# Patient Record
Sex: FEMALE | Race: WHITE
[De-identification: names, ages, dates, MRNs, and addresses within clinical notes are randomized per-mention and may not be internally consistent; named-entity substitution may affect disease eponyms.]

---

## 2020-05-18 PROBLEM — Z00.00 ENCOUNTER FOR PREVENTIVE HEALTH EXAMINATION: Status: ACTIVE | Noted: 2020-05-18

## 2020-05-26 ENCOUNTER — APPOINTMENT (OUTPATIENT)
Dept: VASCULAR SURGERY | Facility: CLINIC | Age: 75
End: 2020-05-26
Payer: MEDICARE

## 2020-05-26 VITALS
WEIGHT: 180 LBS | DIASTOLIC BLOOD PRESSURE: 72 MMHG | BODY MASS INDEX: 30.73 KG/M2 | HEIGHT: 64 IN | SYSTOLIC BLOOD PRESSURE: 138 MMHG

## 2020-05-26 DIAGNOSIS — Z87.891 PERSONAL HISTORY OF NICOTINE DEPENDENCE: ICD-10-CM

## 2020-05-26 PROCEDURE — 99203 OFFICE O/P NEW LOW 30 MIN: CPT

## 2020-05-26 PROCEDURE — 93971 EXTREMITY STUDY: CPT

## 2020-05-26 RX ORDER — IBUPROFEN 800 MG
TABLET ORAL
Refills: 0 | Status: ACTIVE | COMMUNITY

## 2020-05-26 RX ORDER — ELECTROLYTES/DEXTROSE
SOLUTION, ORAL ORAL
Refills: 0 | Status: ACTIVE | COMMUNITY

## 2020-05-26 NOTE — DATA REVIEWED
[FreeTextEntry1] : I performed a venous duplex which was medically necessary to evaluate for DVT. It showed dilated left popliteal vein without any evidence of thrombus, but stagnant flow.\par

## 2020-05-26 NOTE — HISTORY OF PRESENT ILLNESS
[FreeTextEntry1] : 75 y/o female presents for evaluation of left leg swelling for the past 3-4 weeks, denies any trauma, denies any pain, or h/o DVT, also developed a pruritic rash to the medial malleolar region, was seen in Urgent care and prescribed Triamcinolone cream that has now improved the rash.

## 2020-05-26 NOTE — ASSESSMENT
[FreeTextEntry1] : 75 y/o female with left leg swelling for the past 3-4 weeks, denies any trauma, denies any pain, or h/o DVT, also developed a pruritic rash to the medial malleolar region, was seen in Urgent care and prescribed Triamcinolone cream that has now improved the rash.\par I performed a venous duplex which was medically necessary to evaluate for DVT. It showed dilated left popliteal vein without any evidence of thrombus, but stagnant flow.\par I recommend Aspirin 81 mg daily, compression stockings daily during the day, continue the steroid cream and follow up in 4 weeks for repeat venous duplex.

## 2020-06-23 ENCOUNTER — APPOINTMENT (OUTPATIENT)
Dept: VASCULAR SURGERY | Facility: CLINIC | Age: 75
End: 2020-06-23
Payer: MEDICARE

## 2020-06-23 DIAGNOSIS — M79.89 OTHER SPECIFIED SOFT TISSUE DISORDERS: ICD-10-CM

## 2020-06-23 PROCEDURE — 99213 OFFICE O/P EST LOW 20 MIN: CPT

## 2020-06-23 PROCEDURE — 93971 EXTREMITY STUDY: CPT

## 2020-06-23 NOTE — ASSESSMENT
[FreeTextEntry1] : 75 y/o female with left leg swelling for the past 3-4 weeks, seen 4 weeks ago, venous duplex showed dilated left popliteal vein without any evidence of thrombus, but stagnant flow, and was advised to start taking Aspirin 81 mg and compression stockings. She presents for follow up duplex today. States that she still has some left leg swelling and has been applying Triamcinolone cream to the rash.\par I performed a venous duplex which was medically necessary to evaluate for DVT. It showed no evidence of DVT in the left lower extremity.\par I have informed her of the test results and reassured her. She has venous insufficiency and stasis dermatitis. I recommend to continue with use of stockings and steroid cream. She can follow up as needed.

## 2020-06-23 NOTE — HISTORY OF PRESENT ILLNESS
[FreeTextEntry1] : 73 y/o female with left leg swelling for the past 3-4 weeks, seen 4 weeks ago, venous duplex showed dilated left popliteal vein without any evidence of thrombus, but stagnant flow, and was advised to start taking Aspirin 81 mg and compression stockings. She presents for follow up duplex today.\par  \par

## 2021-12-09 ENCOUNTER — EMERGENCY (EMERGENCY)
Facility: HOSPITAL | Age: 76
LOS: 0 days | Discharge: HOME | End: 2021-12-10
Attending: EMERGENCY MEDICINE | Admitting: EMERGENCY MEDICINE
Payer: MEDICARE

## 2021-12-09 VITALS
WEIGHT: 179.9 LBS | SYSTOLIC BLOOD PRESSURE: 193 MMHG | HEART RATE: 82 BPM | DIASTOLIC BLOOD PRESSURE: 78 MMHG | RESPIRATION RATE: 18 BRPM | TEMPERATURE: 98 F | OXYGEN SATURATION: 99 %

## 2021-12-09 DIAGNOSIS — R60.0 LOCALIZED EDEMA: ICD-10-CM

## 2021-12-09 DIAGNOSIS — R42 DIZZINESS AND GIDDINESS: ICD-10-CM

## 2021-12-09 LAB
ALBUMIN SERPL ELPH-MCNC: 4.1 G/DL — SIGNIFICANT CHANGE UP (ref 3.5–5.2)
ALP SERPL-CCNC: 98 U/L — SIGNIFICANT CHANGE UP (ref 30–115)
ALT FLD-CCNC: 13 U/L — SIGNIFICANT CHANGE UP (ref 0–41)
ANION GAP SERPL CALC-SCNC: 14 MMOL/L — SIGNIFICANT CHANGE UP (ref 7–14)
APTT BLD: 28.2 SEC — SIGNIFICANT CHANGE UP (ref 27–39.2)
AST SERPL-CCNC: 16 U/L — SIGNIFICANT CHANGE UP (ref 0–41)
BASOPHILS # BLD AUTO: 0.01 K/UL — SIGNIFICANT CHANGE UP (ref 0–0.2)
BASOPHILS NFR BLD AUTO: 0.3 % — SIGNIFICANT CHANGE UP (ref 0–1)
BILIRUB SERPL-MCNC: 0.3 MG/DL — SIGNIFICANT CHANGE UP (ref 0.2–1.2)
BUN SERPL-MCNC: 12 MG/DL — SIGNIFICANT CHANGE UP (ref 10–20)
CALCIUM SERPL-MCNC: 9.2 MG/DL — SIGNIFICANT CHANGE UP (ref 8.5–10.1)
CHLORIDE SERPL-SCNC: 102 MMOL/L — SIGNIFICANT CHANGE UP (ref 98–110)
CO2 SERPL-SCNC: 23 MMOL/L — SIGNIFICANT CHANGE UP (ref 17–32)
CREAT SERPL-MCNC: 0.6 MG/DL — LOW (ref 0.7–1.5)
EOSINOPHIL # BLD AUTO: 0.09 K/UL — SIGNIFICANT CHANGE UP (ref 0–0.7)
EOSINOPHIL NFR BLD AUTO: 2.7 % — SIGNIFICANT CHANGE UP (ref 0–8)
GLUCOSE SERPL-MCNC: 98 MG/DL — SIGNIFICANT CHANGE UP (ref 70–99)
HCT VFR BLD CALC: 35.7 % — LOW (ref 37–47)
HGB BLD-MCNC: 11.4 G/DL — LOW (ref 12–16)
IMM GRANULOCYTES NFR BLD AUTO: 0.6 % — HIGH (ref 0.1–0.3)
INR BLD: 1.06 RATIO — SIGNIFICANT CHANGE UP (ref 0.65–1.3)
LYMPHOCYTES # BLD AUTO: 1.22 K/UL — SIGNIFICANT CHANGE UP (ref 1.2–3.4)
LYMPHOCYTES # BLD AUTO: 36.4 % — SIGNIFICANT CHANGE UP (ref 20.5–51.1)
MCHC RBC-ENTMCNC: 30.5 PG — SIGNIFICANT CHANGE UP (ref 27–31)
MCHC RBC-ENTMCNC: 31.9 G/DL — LOW (ref 32–37)
MCV RBC AUTO: 95.5 FL — SIGNIFICANT CHANGE UP (ref 81–99)
MONOCYTES # BLD AUTO: 0.17 K/UL — SIGNIFICANT CHANGE UP (ref 0.1–0.6)
MONOCYTES NFR BLD AUTO: 5.1 % — SIGNIFICANT CHANGE UP (ref 1.7–9.3)
NEUTROPHILS # BLD AUTO: 1.84 K/UL — SIGNIFICANT CHANGE UP (ref 1.4–6.5)
NEUTROPHILS NFR BLD AUTO: 54.9 % — SIGNIFICANT CHANGE UP (ref 42.2–75.2)
NRBC # BLD: 0 /100 WBCS — SIGNIFICANT CHANGE UP (ref 0–0)
PLATELET # BLD AUTO: 296 K/UL — SIGNIFICANT CHANGE UP (ref 130–400)
POTASSIUM SERPL-MCNC: 4.7 MMOL/L — SIGNIFICANT CHANGE UP (ref 3.5–5)
POTASSIUM SERPL-SCNC: 4.7 MMOL/L — SIGNIFICANT CHANGE UP (ref 3.5–5)
PROT SERPL-MCNC: 7.1 G/DL — SIGNIFICANT CHANGE UP (ref 6–8)
PROTHROM AB SERPL-ACNC: 12.2 SEC — SIGNIFICANT CHANGE UP (ref 9.95–12.87)
RBC # BLD: 3.74 M/UL — LOW (ref 4.2–5.4)
RBC # FLD: 17.5 % — HIGH (ref 11.5–14.5)
SODIUM SERPL-SCNC: 139 MMOL/L — SIGNIFICANT CHANGE UP (ref 135–146)
TROPONIN T SERPL-MCNC: <0.01 NG/ML — SIGNIFICANT CHANGE UP
WBC # BLD: 3.35 K/UL — LOW (ref 4.8–10.8)
WBC # FLD AUTO: 3.35 K/UL — LOW (ref 4.8–10.8)

## 2021-12-09 PROCEDURE — 99285 EMERGENCY DEPT VISIT HI MDM: CPT

## 2021-12-09 PROCEDURE — 93970 EXTREMITY STUDY: CPT | Mod: 26

## 2021-12-09 PROCEDURE — 70496 CT ANGIOGRAPHY HEAD: CPT | Mod: 26,MA

## 2021-12-09 PROCEDURE — 70450 CT HEAD/BRAIN W/O DYE: CPT | Mod: 26,MA

## 2021-12-09 PROCEDURE — 70498 CT ANGIOGRAPHY NECK: CPT | Mod: 26,MA

## 2021-12-09 PROCEDURE — 93010 ELECTROCARDIOGRAM REPORT: CPT

## 2021-12-09 RX ORDER — SODIUM CHLORIDE 9 MG/ML
1000 INJECTION, SOLUTION INTRAVENOUS ONCE
Refills: 0 | Status: COMPLETED | OUTPATIENT
Start: 2021-12-09 | End: 2021-12-09

## 2021-12-09 RX ORDER — MECLIZINE HCL 12.5 MG
50 TABLET ORAL ONCE
Refills: 0 | Status: COMPLETED | OUTPATIENT
Start: 2021-12-09 | End: 2021-12-09

## 2021-12-09 RX ADMIN — Medication 50 MILLIGRAM(S): at 20:59

## 2021-12-09 RX ADMIN — SODIUM CHLORIDE 1000 MILLILITER(S): 9 INJECTION, SOLUTION INTRAVENOUS at 20:23

## 2021-12-09 NOTE — ED PROVIDER NOTE - NS ED ROS FT
Constitutional: (-) fever  Eyes/ENT: (-) visual changes   Cardiovascular: (-) chest pain, (-) syncope  Respiratory: (-) cough, (-) shortness of breath  Gastrointestinal: (-) vomiting, (-) diarrhea  Genitourinary: (-) dysuria, (-) hesitancy, (-) frequency   Musculoskeletal: (-) neck pain, (-) back pain, (-) joint pain  Integumentary: (-) rash, (+) L leg edema  Neurological: (-) headache, (-) altered mental status, dizziness   Allergic/Immunologic: (-) pruritus

## 2021-12-09 NOTE — ED ADULT TRIAGE NOTE - CHIEF COMPLAINT QUOTE
Patient c/o room spinning dizziness intermittently x 2 months, worse when changing positions. Patient has hx of vertigo. Patient also left leg swelling and redness

## 2021-12-09 NOTE — ED PROVIDER NOTE - ATTENDING CONTRIBUTION TO CARE
75 yo F pmh of vertigo and hx of brain aneurism sp coiling in 2008 presents with 1 month hx of intermittent dizziness. Describes it as room spinning, assocaited with nasuea. Dizziness worse with movement. States that yesterday the dizziness worsening so came in for evaluation. no headaches. no numbnes,s tingling or weakness. Able to ambulate. Also reports that for a few months has been having swelling to her left lower extremity around her ankle. Previous negative evaluation by vascular. no fevers or recent illness.     CONSTITUTIONAL: Well-developed; well-nourished; in no acute distress.   SKIN: warm, dry  HEAD: Normocephalic; atraumatic.  EYES: PERRL, EOMI, no conjunctival erythema  ENT: No nasal discharge; airway clear.  NECK: Supple; non tender.  CARD: S1, S2 normal;  Regular rate and rhythm.   RESP: No wheezes, rales or rhonchi.  ABD: soft non tender, non distended, no rebound or guarding  EXT: Normal ROM.  5/5 strength in all 4 extremities   LYMPH: No acute cervical adenopathy.  NEURO: A&Ox3, CN 2-11 intact, moving all extremities, 5/5 strength, equal sensation bilaterally, normal finger to nose exam.   PSYCH: Cooperative, appropriate.

## 2021-12-09 NOTE — ED PROVIDER NOTE - CLINICAL SUMMARY MEDICAL DECISION MAKING FREE TEXT BOX
evaluated for vertigo. imaging obtained to evaluate for posterior stroke. lab work was nml. neurology was consulted and recommended admission for further investigation however patient decided to leave ama.    The patient wishes to leave against medical advice.  I have discussed the risks, benefits and alternatives (including the possibility of worsening of disease, pain, permanent disability, and/or death) with the patient and his/her family (if available).  The patient voices understanding of these risks, benefits, and alternatives and still wishes to sign out against medical advice.  The patient is awake, alert, oriented  x 3 and has demonstrated capacity to refuse/direct care.  I have advised the patient that they can and should return immediately should they develop any worse/different/additional symptoms, or if they change their mind and want to continue their care.

## 2021-12-09 NOTE — ED PROVIDER NOTE - PROGRESS NOTE DETAILS
preliminary duplex read negative for dvt Patient signed out to Dr. Higgins pending imaging and reassessment. I had extensive discussion of risks and benefits of pursuing further medical evaluation and/or care with patient and any available family/friends; patient still electing to leave against medical advice. Patient is awake, alert, oriented and demonstrates full capacity and insight into illness. Patient aware and encouraged to return immediately to ED or nearest ED if patient decides to change mind regarding care or if patient experiences any new, worsening, or concerning symptoms.

## 2021-12-09 NOTE — ED PROVIDER NOTE - NSFOLLOWUPCLINICS_GEN_ALL_ED_FT
[facility]  Neurology  [address] [place] [address], [facility] [address]  [place] [address]  Phone: [phone]  Fax:   Follow Up Time: 1-3 Days

## 2021-12-09 NOTE — ED PROVIDER NOTE - OBJECTIVE STATEMENT
77 yo female with a pmh of vertigo presents c/o intermittent dizziness for 2 months. pt describes the dizziness as room spinning sensation and notes aggravation with moving from sitting to standing. pt also mentions to have L leg edema since the spring and states to have been evaluated by vascular w/ a negative duplex. pt denies any other symptoms including fevers, chill, headache, recent illness/travel, cough, abdominal pain, chest pain, or SOB.

## 2021-12-09 NOTE — ED PROVIDER NOTE - PATIENT PORTAL LINK FT
You can access the FollowMyHealth Patient Portal offered by [facility] by registering at the following website: [email or web address]. By joining [facility]’s FollowMyHealth portal, you will also be able to view your health information using other applications (apps) compatible with our system.

## 2021-12-10 VITALS
TEMPERATURE: 98 F | DIASTOLIC BLOOD PRESSURE: 66 MMHG | HEART RATE: 72 BPM | OXYGEN SATURATION: 98 % | RESPIRATION RATE: 18 BRPM | SYSTOLIC BLOOD PRESSURE: 144 MMHG

## 2021-12-10 NOTE — CONSULT NOTE ADULT - SUBJECTIVE AND OBJECTIVE BOX
CC: Dizziness        HPI:  75 yo female with a pmhX of brain aneurysm s/p clipping at [facility] in [place] presented with the c/o intermittent dizziness for past 2 months a/w nausea. Patient describes dizziness as (imbalance >room spinning) aggravation with positional changes. She also reports neck pain/ tenderness She reports seeing her neurologist in [place] 3 months ago for a routine follow up for her aneurysm and reports the imaging to be negative for any acute changes. Denies focal weakness, vomiting, ear symptoms.       Home Medications:  Unable to obtain info        Social History  Denies smoking alcohol or drug use      Neuro Exam:  Orientation: patient is awake alert/ oriented to person place and time. Normal attention and comprehension.  Cranial Nerves: EOMI, has mild horizontal nystagmus which resolves with fixation, perrla, left facial droop (baseline as per patient and family), tongue midline  Motor: 5/5 throughout/ no drift   Sensory exam: Intact and symmetric  Coordination: No dysmetria or limb ataxia  Gait: slow and need to focus to walk, not unsteady, neither wide based.  Rombergs negative  No meningeal signs            NIHSS:   LOC:       1a: 0    1b(Questions):   0        1c(Instructions): 0            Best Gaze: 0  Visual:0  Motor:                 RUE: 0    RLE:0     LUE:  0   LLE:  0   FACE: 0    Limb Ataxia: 0  Sensory:    0   Language:  0     Dysarthria:  0        Extinction and Inattention: 0        mRs  0 No symptoms at all  1 No significant disability despite symptoms; able to carry out all usual duties and activities without assistance  2 Slight disability; unable to carry out all previous activities, but able to look after own affairs  3 Moderate disability; requiring some help, but able to walk without assistance  4 Moderately severe disability; unable to walk without assistance and unable to attend to own bodily needs without assistance  5 Severe disability; bedridden, incontinent and requiring constant nursing care and attention  6 Dead          Allergies    No Known Allergies    Intolerances      MEDICATIONS  (STANDING):    MEDICATIONS  (PRN):      LABS:                        11.4   3.35  )-----------( 296      ( 09 Dec 2021 20:05 )             35.7     12-09    139  |  102  |  12  ----------------------------<  98  4.7   |  23  |  0.6<L>    Ca    9.2      09 Dec 2021 20:05    TPro  7.1  /  Alb  4.1  /  TBili  0.3  /  DBili  x   /  AST  16  /  ALT  13  /  AlkPhos  98  12-09    PT/INR - ( 09 Dec 2021 20:05 )   PT: 12.20 sec;   INR: 1.06 ratio         PTT - ( 09 Dec 2021 20:05 )  PTT:28.2 sec        Neuro Imaging:  < from: CT Head No Cont (12.09.21 @ 22:23) >  IMPRESSION:    Limited CT head secondary to significant streak artifact associated with   the metallic aneurysm coil in the right suprasellar cistern.    Allowing for this limitation, no evidence of acute intracranial   pathology. Recommend further evaluation with MRI as indicated.    Mild-moderate chronic microvascular ischemic changes.    --- End of Report ---    PHANI LOCKE MD; Attending Radiologist  This document has been electronically signed. Dec  9 2021 11:07PM    < end of copied text >        < from: CT Angio Neck w/ IV Cont (12.09.21 @ 22:37) >    IMPRESSION:    CTA HEAD:  -Aneurysm clip in the region of the right posterior communicating artery   results in streak artifact, limiting evaluation of surrounding vascular   structures.  -The visualized intracranial vasculature demonstrate no evidence of   flow-limiting stenosis, occlusion or aneurysm.    CTA NECK:  -No evidence of carotid or vertebral artery stenosis.        ******PRELIMINARY REPORT******      ******PRELIMINARYREPORT******       ABDULKADIR BARONE MD; Resident Radiologist    < end of copied text >    EEG:     Echo:   Carotid Doppler: N/A  Telemetry:

## 2021-12-10 NOTE — CONSULT NOTE ADULT - ASSESSMENT
77 yo RHF with pmhX of brain aneurysm s/p clipping at [facility] in [place] p/w c/o intermittent dizziness for past 2 months a/w nausea. Patient describes dizziness as (imbalance >room spinning) aggravation with positional changes. She also reports neck pain/ tenderness She reports seeing her neurologist in [place] 3 months ago for a routine follow up for her aneurysm and reports the imaging to be negative for any acute changes. Denies focal weakness, vomiting, ear symptoms. Here in ed patient is nonfocal , no cn deficits but needs to focus a bit when ambulating. NIHSS 0       Plan  Admit to ED obs  TIA Work up  Patient explained the need to stay to investigate her complaints further but she is signing out AMA       77 yo RHF with pmhX of brain aneurysm s/p clipping at [facility] in [place] p/w c/o intermittent dizziness for past 2 months a/w nausea. Patient describes dizziness as (imbalance >room spinning) aggravation with positional changes. She also reports neck pain/ tenderness She reports seeing her neurologist in [place] 3 months ago for a routine follow up for her aneurysm and reports the imaging to be negative for any acute changes. Denies focal weakness, vomiting, ear symptoms. Here in ed patient is nonfocal , no cn deficits but needs to focus a bit when ambulating. NIHSS 0 . CTH negative , cta h/n Negative for LVO or stenosis. Aneurysm clip in the region of the right posterior communicating artery is intact.      Plan  Admit to ED obs  TIA Work up  Patient explained the need to stay to investigate her complaints further but she is signing out AMA

## 2022-11-11 ENCOUNTER — INPATIENT (INPATIENT)
Facility: HOSPITAL | Age: 77
LOS: 9 days | Discharge: HOME HEALTH | End: 2022-11-21
Attending: STUDENT IN AN ORGANIZED HEALTH CARE EDUCATION/TRAINING PROGRAM | Admitting: STUDENT IN AN ORGANIZED HEALTH CARE EDUCATION/TRAINING PROGRAM

## 2022-11-11 VITALS
DIASTOLIC BLOOD PRESSURE: 78 MMHG | SYSTOLIC BLOOD PRESSURE: 184 MMHG | HEART RATE: 121 BPM | OXYGEN SATURATION: 99 % | RESPIRATION RATE: 22 BRPM | TEMPERATURE: 103 F | WEIGHT: 160.06 LBS

## 2022-11-11 DIAGNOSIS — Z98.890 OTHER SPECIFIED POSTPROCEDURAL STATES: Chronic | ICD-10-CM

## 2022-11-11 DIAGNOSIS — C91.00 ACUTE LYMPHOBLASTIC LEUKEMIA NOT HAVING ACHIEVED REMISSION: ICD-10-CM

## 2022-11-11 DIAGNOSIS — D70.9 NEUTROPENIA, UNSPECIFIED: ICD-10-CM

## 2022-11-11 LAB
ALBUMIN SERPL ELPH-MCNC: 3.9 G/DL — SIGNIFICANT CHANGE UP (ref 3.5–5.2)
ALP SERPL-CCNC: 102 U/L — SIGNIFICANT CHANGE UP (ref 30–115)
ALT FLD-CCNC: 29 U/L — SIGNIFICANT CHANGE UP (ref 0–41)
ANION GAP SERPL CALC-SCNC: 12 MMOL/L — SIGNIFICANT CHANGE UP (ref 7–14)
ANISOCYTOSIS BLD QL: SLIGHT — SIGNIFICANT CHANGE UP
APPEARANCE UR: ABNORMAL
APTT BLD: 33.2 SEC — SIGNIFICANT CHANGE UP (ref 27–39.2)
AST SERPL-CCNC: 16 U/L — SIGNIFICANT CHANGE UP (ref 0–41)
BACTERIA # UR AUTO: ABNORMAL
BASE EXCESS BLDV CALC-SCNC: 1.5 MMOL/L — SIGNIFICANT CHANGE UP (ref -2–3)
BASO STIPL BLD QL SMEAR: PRESENT — SIGNIFICANT CHANGE UP
BASOPHILS # BLD AUTO: 0 K/UL — SIGNIFICANT CHANGE UP (ref 0–0.2)
BASOPHILS NFR BLD AUTO: 0 % — SIGNIFICANT CHANGE UP (ref 0–1)
BILIRUB SERPL-MCNC: 1.9 MG/DL — HIGH (ref 0.2–1.2)
BILIRUB UR-MCNC: NEGATIVE — SIGNIFICANT CHANGE UP
BUN SERPL-MCNC: 16 MG/DL — SIGNIFICANT CHANGE UP (ref 10–20)
CA-I SERPL-SCNC: 1.14 MMOL/L — LOW (ref 1.15–1.33)
CALCIUM SERPL-MCNC: 8.9 MG/DL — SIGNIFICANT CHANGE UP (ref 8.4–10.5)
CHLORIDE SERPL-SCNC: 99 MMOL/L — SIGNIFICANT CHANGE UP (ref 98–110)
CO2 SERPL-SCNC: 25 MMOL/L — SIGNIFICANT CHANGE UP (ref 17–32)
COLOR SPEC: YELLOW — SIGNIFICANT CHANGE UP
CREAT SERPL-MCNC: 0.8 MG/DL — SIGNIFICANT CHANGE UP (ref 0.7–1.5)
DACRYOCYTES BLD QL SMEAR: SLIGHT — SIGNIFICANT CHANGE UP
DIFF PNL FLD: ABNORMAL
EGFR: 77 ML/MIN/1.73M2 — SIGNIFICANT CHANGE UP
EOSINOPHIL # BLD AUTO: 0 K/UL — SIGNIFICANT CHANGE UP (ref 0–0.7)
EOSINOPHIL NFR BLD AUTO: 0 % — SIGNIFICANT CHANGE UP (ref 0–8)
EPI CELLS # UR: ABNORMAL /HPF
FLUAV AG NPH QL: SIGNIFICANT CHANGE UP
FLUBV AG NPH QL: SIGNIFICANT CHANGE UP
GAS PNL BLDV: 136 MMOL/L — SIGNIFICANT CHANGE UP (ref 136–145)
GAS PNL BLDV: SIGNIFICANT CHANGE UP
GAS PNL BLDV: SIGNIFICANT CHANGE UP
GIANT PLATELETS BLD QL SMEAR: PRESENT — SIGNIFICANT CHANGE UP
GLUCOSE SERPL-MCNC: 163 MG/DL — HIGH (ref 70–99)
GLUCOSE UR QL: NEGATIVE MG/DL — SIGNIFICANT CHANGE UP
HCO3 BLDV-SCNC: 26 MMOL/L — SIGNIFICANT CHANGE UP (ref 22–29)
HCT VFR BLD CALC: 23.9 % — LOW (ref 37–47)
HCT VFR BLDA CALC: 27 % — LOW (ref 39–51)
HGB BLD CALC-MCNC: 9 G/DL — LOW (ref 12.6–17.4)
HGB BLD-MCNC: 8 G/DL — LOW (ref 12–16)
IMM GRANULOCYTES NFR BLD AUTO: 0 % — LOW (ref 0.1–0.3)
INR BLD: 1.36 RATIO — HIGH (ref 0.65–1.3)
KETONES UR-MCNC: NEGATIVE — SIGNIFICANT CHANGE UP
LACTATE BLDV-MCNC: 1.4 MMOL/L — SIGNIFICANT CHANGE UP (ref 0.5–2)
LACTATE SERPL-SCNC: 1.4 MMOL/L — SIGNIFICANT CHANGE UP (ref 0.7–2)
LEUKOCYTE ESTERASE UR-ACNC: NEGATIVE — SIGNIFICANT CHANGE UP
LYMPHOCYTES # BLD AUTO: 0.14 K/UL — LOW (ref 1.2–3.4)
LYMPHOCYTES # BLD AUTO: 51.9 % — HIGH (ref 20.5–51.1)
MACROCYTES BLD QL: SLIGHT — SIGNIFICANT CHANGE UP
MANUAL SMEAR VERIFICATION: SIGNIFICANT CHANGE UP
MCHC RBC-ENTMCNC: 32.1 PG — HIGH (ref 27–31)
MCHC RBC-ENTMCNC: 33.5 G/DL — SIGNIFICANT CHANGE UP (ref 32–37)
MCV RBC AUTO: 96 FL — SIGNIFICANT CHANGE UP (ref 81–99)
MICROCYTES BLD QL: SLIGHT — SIGNIFICANT CHANGE UP
MONOCYTES # BLD AUTO: 0.03 K/UL — LOW (ref 0.1–0.6)
MONOCYTES NFR BLD AUTO: 11.1 % — HIGH (ref 1.7–9.3)
NEUTROPHILS # BLD AUTO: 0.1 K/UL — LOW (ref 1.4–6.5)
NEUTROPHILS NFR BLD AUTO: 37 % — LOW (ref 42.2–75.2)
NITRITE UR-MCNC: NEGATIVE — SIGNIFICANT CHANGE UP
NRBC # BLD: 0 /100 WBCS — SIGNIFICANT CHANGE UP (ref 0–0)
NRBC # BLD: 4 /100 — HIGH (ref 0–0)
OVALOCYTES BLD QL SMEAR: SLIGHT — SIGNIFICANT CHANGE UP
PCO2 BLDV: 39 MMHG — SIGNIFICANT CHANGE UP (ref 39–42)
PH BLDV: 7.43 — SIGNIFICANT CHANGE UP (ref 7.32–7.43)
PH UR: 6 — SIGNIFICANT CHANGE UP (ref 5–8)
PLAT MORPH BLD: ABNORMAL
PLATELET # BLD AUTO: 97 K/UL — LOW (ref 130–400)
PO2 BLDV: 27 MMHG — SIGNIFICANT CHANGE UP
POTASSIUM BLDV-SCNC: 3.5 MMOL/L — SIGNIFICANT CHANGE UP (ref 3.5–5.1)
POTASSIUM SERPL-MCNC: 3.5 MMOL/L — SIGNIFICANT CHANGE UP (ref 3.5–5)
POTASSIUM SERPL-SCNC: 3.5 MMOL/L — SIGNIFICANT CHANGE UP (ref 3.5–5)
PROT SERPL-MCNC: 6.2 G/DL — SIGNIFICANT CHANGE UP (ref 6–8)
PROT UR-MCNC: 100 MG/DL
PROTHROM AB SERPL-ACNC: 15.6 SEC — HIGH (ref 9.95–12.87)
RBC # BLD: 2.49 M/UL — LOW (ref 4.2–5.4)
RBC # FLD: 20.6 % — HIGH (ref 11.5–14.5)
RBC BLD AUTO: ABNORMAL
RBC CASTS # UR COMP ASSIST: ABNORMAL /HPF
RSV RNA NPH QL NAA+NON-PROBE: SIGNIFICANT CHANGE UP
SAO2 % BLDV: 46.4 % — SIGNIFICANT CHANGE UP
SARS-COV-2 RNA SPEC QL NAA+PROBE: SIGNIFICANT CHANGE UP
SODIUM SERPL-SCNC: 136 MMOL/L — SIGNIFICANT CHANGE UP (ref 135–146)
SP GR SPEC: 1.01 — SIGNIFICANT CHANGE UP (ref 1.01–1.03)
UROBILINOGEN FLD QL: 1 MG/DL
WBC # BLD: 0.27 K/UL — CRITICAL LOW (ref 4.8–10.8)
WBC # FLD AUTO: 0.27 K/UL — CRITICAL LOW (ref 4.8–10.8)
WBC UR QL: ABNORMAL /HPF

## 2022-11-11 PROCEDURE — 99291 CRITICAL CARE FIRST HOUR: CPT | Mod: GC

## 2022-11-11 PROCEDURE — 93010 ELECTROCARDIOGRAM REPORT: CPT

## 2022-11-11 PROCEDURE — 74177 CT ABD & PELVIS W/CONTRAST: CPT | Mod: 26,MA

## 2022-11-11 PROCEDURE — 99223 1ST HOSP IP/OBS HIGH 75: CPT | Mod: AI

## 2022-11-11 PROCEDURE — 71260 CT THORAX DX C+: CPT | Mod: 26,MA

## 2022-11-11 PROCEDURE — 71045 X-RAY EXAM CHEST 1 VIEW: CPT | Mod: 26

## 2022-11-11 RX ORDER — VORICONAZOLE 10 MG/ML
250 INJECTION, POWDER, LYOPHILIZED, FOR SOLUTION INTRAVENOUS EVERY 12 HOURS
Refills: 0 | Status: DISCONTINUED | OUTPATIENT
Start: 2022-11-12 | End: 2022-11-15

## 2022-11-11 RX ORDER — ONDANSETRON 8 MG/1
4 TABLET, FILM COATED ORAL EVERY 8 HOURS
Refills: 0 | Status: DISCONTINUED | OUTPATIENT
Start: 2022-11-11 | End: 2022-11-21

## 2022-11-11 RX ORDER — IPRATROPIUM/ALBUTEROL SULFATE 18-103MCG
3 AEROSOL WITH ADAPTER (GRAM) INHALATION EVERY 6 HOURS
Refills: 0 | Status: DISCONTINUED | OUTPATIENT
Start: 2022-11-11 | End: 2022-11-21

## 2022-11-11 RX ORDER — ACETAMINOPHEN 500 MG
650 TABLET ORAL ONCE
Refills: 0 | Status: COMPLETED | OUTPATIENT
Start: 2022-11-11 | End: 2022-11-11

## 2022-11-11 RX ORDER — ALLOPURINOL 300 MG
1 TABLET ORAL
Qty: 0 | Refills: 0 | DISCHARGE

## 2022-11-11 RX ORDER — SODIUM CHLORIDE 9 MG/ML
1000 INJECTION, SOLUTION INTRAVENOUS
Refills: 0 | Status: DISCONTINUED | OUTPATIENT
Start: 2022-11-11 | End: 2022-11-14

## 2022-11-11 RX ORDER — ONDANSETRON 8 MG/1
4 TABLET, FILM COATED ORAL ONCE
Refills: 0 | Status: COMPLETED | OUTPATIENT
Start: 2022-11-11 | End: 2022-11-11

## 2022-11-11 RX ORDER — ACYCLOVIR SODIUM 500 MG
1 VIAL (EA) INTRAVENOUS
Qty: 0 | Refills: 0 | DISCHARGE

## 2022-11-11 RX ORDER — CEFEPIME 1 G/1
1000 INJECTION, POWDER, FOR SOLUTION INTRAMUSCULAR; INTRAVENOUS ONCE
Refills: 0 | Status: COMPLETED | OUTPATIENT
Start: 2022-11-11 | End: 2022-11-11

## 2022-11-11 RX ORDER — VANCOMYCIN HCL 1 G
1000 VIAL (EA) INTRAVENOUS ONCE
Refills: 0 | Status: COMPLETED | OUTPATIENT
Start: 2022-11-11 | End: 2022-11-11

## 2022-11-11 RX ORDER — CEFEPIME 1 G/1
2000 INJECTION, POWDER, FOR SOLUTION INTRAMUSCULAR; INTRAVENOUS ONCE
Refills: 0 | Status: COMPLETED | OUTPATIENT
Start: 2022-11-11 | End: 2022-11-11

## 2022-11-11 RX ORDER — VORICONAZOLE 10 MG/ML
400 INJECTION, POWDER, LYOPHILIZED, FOR SOLUTION INTRAVENOUS ONCE
Refills: 0 | Status: COMPLETED | OUTPATIENT
Start: 2022-11-11 | End: 2022-11-11

## 2022-11-11 RX ORDER — IPRATROPIUM/ALBUTEROL SULFATE 18-103MCG
3 AEROSOL WITH ADAPTER (GRAM) INHALATION ONCE
Refills: 0 | Status: COMPLETED | OUTPATIENT
Start: 2022-11-11 | End: 2022-11-11

## 2022-11-11 RX ORDER — CEFEPIME 1 G/1
INJECTION, POWDER, FOR SOLUTION INTRAMUSCULAR; INTRAVENOUS
Refills: 0 | Status: DISCONTINUED | OUTPATIENT
Start: 2022-11-11 | End: 2022-11-11

## 2022-11-11 RX ORDER — VANCOMYCIN HCL 1 G
VIAL (EA) INTRAVENOUS
Refills: 0 | Status: DISCONTINUED | OUTPATIENT
Start: 2022-11-11 | End: 2022-11-15

## 2022-11-11 RX ORDER — ALLOPURINOL 300 MG
300 TABLET ORAL DAILY
Refills: 0 | Status: DISCONTINUED | OUTPATIENT
Start: 2022-11-11 | End: 2022-11-21

## 2022-11-11 RX ORDER — ACYCLOVIR SODIUM 500 MG
400 VIAL (EA) INTRAVENOUS DAILY
Refills: 0 | Status: DISCONTINUED | OUTPATIENT
Start: 2022-11-11 | End: 2022-11-21

## 2022-11-11 RX ORDER — POTASSIUM CHLORIDE 20 MEQ
1 PACKET (EA) ORAL
Qty: 0 | Refills: 0 | DISCHARGE

## 2022-11-11 RX ORDER — ENOXAPARIN SODIUM 100 MG/ML
40 INJECTION SUBCUTANEOUS EVERY 24 HOURS
Refills: 0 | Status: DISCONTINUED | OUTPATIENT
Start: 2022-11-11 | End: 2022-11-21

## 2022-11-11 RX ORDER — ACETAMINOPHEN 500 MG
650 TABLET ORAL EVERY 6 HOURS
Refills: 0 | Status: DISCONTINUED | OUTPATIENT
Start: 2022-11-11 | End: 2022-11-21

## 2022-11-11 RX ORDER — SODIUM CHLORIDE 9 MG/ML
2300 INJECTION, SOLUTION INTRAVENOUS ONCE
Refills: 0 | Status: COMPLETED | OUTPATIENT
Start: 2022-11-11 | End: 2022-11-11

## 2022-11-11 RX ORDER — CEFEPIME 1 G/1
2000 INJECTION, POWDER, FOR SOLUTION INTRAMUSCULAR; INTRAVENOUS EVERY 8 HOURS
Refills: 0 | Status: DISCONTINUED | OUTPATIENT
Start: 2022-11-12 | End: 2022-11-18

## 2022-11-11 RX ORDER — VORICONAZOLE 10 MG/ML
200 INJECTION, POWDER, LYOPHILIZED, FOR SOLUTION INTRAVENOUS ONCE
Refills: 0 | Status: DISCONTINUED | OUTPATIENT
Start: 2022-11-11 | End: 2022-11-11

## 2022-11-11 RX ORDER — VANCOMYCIN HCL 1 G
1000 VIAL (EA) INTRAVENOUS EVERY 12 HOURS
Refills: 0 | Status: DISCONTINUED | OUTPATIENT
Start: 2022-11-12 | End: 2022-11-15

## 2022-11-11 RX ADMIN — Medication 1000 MILLIGRAM(S): at 16:21

## 2022-11-11 RX ADMIN — VORICONAZOLE 125 MILLIGRAM(S): 10 INJECTION, POWDER, LYOPHILIZED, FOR SOLUTION INTRAVENOUS at 22:56

## 2022-11-11 RX ADMIN — Medication 650 MILLIGRAM(S): at 20:27

## 2022-11-11 RX ADMIN — Medication 3 MILLILITER(S): at 20:35

## 2022-11-11 RX ADMIN — Medication 650 MILLIGRAM(S): at 15:10

## 2022-11-11 RX ADMIN — ONDANSETRON 104 MILLIGRAM(S): 8 TABLET, FILM COATED ORAL at 15:08

## 2022-11-11 RX ADMIN — Medication 250 MILLIGRAM(S): at 14:53

## 2022-11-11 RX ADMIN — SODIUM CHLORIDE 100 MILLILITER(S): 9 INJECTION, SOLUTION INTRAVENOUS at 19:56

## 2022-11-11 RX ADMIN — ONDANSETRON 4 MILLIGRAM(S): 8 TABLET, FILM COATED ORAL at 16:21

## 2022-11-11 RX ADMIN — ENOXAPARIN SODIUM 40 MILLIGRAM(S): 100 INJECTION SUBCUTANEOUS at 22:56

## 2022-11-11 RX ADMIN — SODIUM CHLORIDE 2300 MILLILITER(S): 9 INJECTION, SOLUTION INTRAVENOUS at 14:53

## 2022-11-11 RX ADMIN — CEFEPIME 2000 MILLIGRAM(S): 1 INJECTION, POWDER, FOR SOLUTION INTRAMUSCULAR; INTRAVENOUS at 16:00

## 2022-11-11 RX ADMIN — CEFEPIME 100 MILLIGRAM(S): 1 INJECTION, POWDER, FOR SOLUTION INTRAMUSCULAR; INTRAVENOUS at 14:52

## 2022-11-11 RX ADMIN — SODIUM CHLORIDE 2300 MILLILITER(S): 9 INJECTION, SOLUTION INTRAVENOUS at 17:13

## 2022-11-11 NOTE — H&P ADULT - PROBLEM SELECTOR PLAN 2
- Hem/Onc Consult  - Follow HG  - Hg <8.0 consider transfusion  - T&S  - Monitor Vitals - cont px acyclovir and allopurinol   - Hem/Onc Consult  - Follow HG  - Hg <8.0 consider transfusion  - T&S  - Monitor Vitals  - hold chemo for now    Weakness  - cont iv fluids    DVT px  -Lovenox     Full code AML diagnosed approx 1 yr ago   - cont px acyclovir and allopurinol   - Hem/Onc Consult  - Follow HG  - Hg <8.0 consider transfusion  - T&S  - Monitor Vitals  - hold chemo for now    Weakness  - cont iv fluids    DVT px  -Lovenox     Full code

## 2022-11-11 NOTE — H&P ADULT - NSICDXFAMILYHX_GEN_ALL_CORE_FT
FAMILY HISTORY:  No pertinent family history in first degree relatives FAMILY HISTORY:  Mother  Still living? Unknown  FH: heart disease, Age at diagnosis: Age Unknown

## 2022-11-11 NOTE — PATIENT PROFILE ADULT - PATIENT'S GENDER IDENTITY
Rossy Carter), Surgery  [address] [place] [address]  Phone: [phone]  Fax: [phone] Female

## 2022-11-11 NOTE — H&P ADULT - PROBLEM SELECTOR PLAN 1
- Admit to med surg ( Reverse Isolation)  - Tmax 104 - Tylenol prn temp>100  - Follow Blood Cx & Urine Cx  - Start Vanco / Cefepime for possible PNA/Sepsis  - Start Voriconazole IV for possible fungal PNA  - Monitor Lactate, Procal   - Legionella, Strep,   - MRSA swab  - repeat CBC, CMP, Mg  - Oxygen prn n/c   - ID consult Sepsis unclear etiology with Left lower lobe consolidation, c/f pneumonia, not c/w severe sepsis with severe neutropenic fever   - Admit to med surg ( Reverse Isolation)  - Tmax 104 - Tylenol prn temp>100  - Follow Blood Cx & Urine Cx  - Start Vanco / Cefepime for possible PNA/Sepsis, LR iv fluids   - Start Voriconazole IV for possible fungal PNA  - initial LA wnl Monitor Lactate, Procal   - Legionella, Strep Ag pending    - MRSA swab, obtain RVP panel, neutropenic precautions   - repeat CBC, CMP, Mg  - Oxygen prn n/c   - ID consult, Hematology consult, may need neupogen

## 2022-11-11 NOTE — ED PROVIDER NOTE - OBJECTIVE STATEMENT
Pt is a 78 y/o female with PMH of AML on chemo (follows with oncologist Dr. Marsh) and vertigo BIBEMS for fever. As per daughter, pt has been fatigued the last 3 days associated with malaise and vomiting that started today. No cough, congestion, abdominal pain, diarrhea or dysuria.

## 2022-11-11 NOTE — H&P ADULT - NSHPSOCIALHISTORY_GEN_ALL_CORE
Living Condition: with   Ambulation Status:  independent  Tobacco use:  Denies  EtOH use:  Denies  Illicit drug use: Denies  Marital Status: Yes Living Condition: with   Ambulation Status:  independent  Tobacco use:  Denies  EtOH use:  Denies  Illicit drug use: Denies  Marital Status: Yes    Family history: Father: cancer

## 2022-11-11 NOTE — H&P ADULT - NSICDXPASTMEDICALHX_GEN_ALL_CORE_FT
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

## 2022-11-11 NOTE — H&P ADULT - NSHPLABSRESULTS_GEN_ALL_CORE
8.0    0.27  )-----------( 97       ( 2022 15:09 )             23.9           136  |  99  |  16  ----------------------------<  163<H>  3.5   |  25  |  0.8    Ca    8.9      2022 15:09    TPro  6.2  /  Alb  3.9  /  TBili  1.9<H>  /  DBili  x   /  AST  16  /  ALT  29  /  AlkPhos  102  11-11              Urinalysis Basic - ( 2022 17:10 )    Color: Yellow / Appearance: Slightly Cloudy / S.015 / pH: x  Gluc: x / Ketone: Negative  / Bili: Negative / Urobili: 1.0 mg/dL   Blood: x / Protein: 100 mg/dL / Nitrite: Negative   Leuk Esterase: Negative / RBC: 3-5 /HPF / WBC 6-10 /HPF   Sq Epi: x / Non Sq Epi: Many /HPF / Bacteria: Many        PT/INR - ( 2022 15:09 )   PT: 15.60 sec;   INR: 1.36 ratio         PTT - ( 2022 15:09 )  PTT:33.2 sec    Lactate Trend   @ 15:09 Lactate:1.4             < from: CT Chest w/ IV Cont (22 @ 16:32) >    IMPRESSION:    There is a focal opacity in the left infrahilar region, medially and   posteriorly in the left lower lobe. Margins are ill-defined. This may   represent focal area of pneumonia, although tumor cannot be excluded. In   the absence of previous scans for comparison,a PET/CT or a short-term   follow-up CT scan in 3 months is recommended.    Mild hepatosplenomegaly.    --- End of Report ---    MIRIAM HUYNH MD; Attending Interventional Radiologist  This document has been electronically signed. 2022  5:40PM    < end of copied text >

## 2022-11-11 NOTE — H&P ADULT - NSHPREVIEWOFSYSTEMS_GEN_ALL_CORE
REVIEW OF SYSTEMS:    CONSTITUTIONAL: see HPI  EYES/ENT: No visual changes;  No vertigo or throat pain   NECK: No pain or stiffness  RESPIRATORY: see HPI  CARDIOVASCULAR: No chest pain or palpitations  GASTROINTESTINAL: No abdominal or epigastric pain. ++Nausea & Vomiting. No diarrhea or constipation. No melena or hematochezia.  GENITOURINARY: No dysuria, frequency or hematuria  NEUROLOGICAL: No numbness or weakness  SKIN: No itching, rashes

## 2022-11-11 NOTE — ED PROVIDER NOTE - CLINICAL SUMMARY MEDICAL DECISION MAKING FREE TEXT BOX
77-year-old female presented today with fever.  Patient is hemodynamically stable upon evaluation.  Patient was given antipyretic medications, IV fluids and started on broad-spectrum antibiotics for concerns of neutropenic fever.  After patient's labs resulted it was confirmed that she has neutropenia.  ID was also consulted as patient has findings indicative of possible pneumonia.  Per ID recommendations we started patient on antifungal medications.  LP was not recommended at this time based on IP recommendations.  Patient started on all appropriate antibacterial and antifungal medications and admitted for further care.    Attending Statement: I have personally provided the amount of critical care time documented below excluding time spent on separate procedures.     Critical Care Time Spent (min) Must be 30 or more minutes to qualify: 35.

## 2022-11-11 NOTE — H&P ADULT - NSHPOUTPATIENTPROVIDERS_GEN_ALL_CORE
PCP- Dr Kaur  Oncology - Dr Jean Carlos Funes PCP- Dr Kaur  Oncology - Dr Jean Carlos Funes @ [facility] [[email or web address]]

## 2022-11-11 NOTE — PATIENT PROFILE ADULT - FALL HARM RISK - HARM RISK INTERVENTIONS

## 2022-11-11 NOTE — ED PROVIDER NOTE - PHYSICAL EXAMINATION
CONST: well appearing for age  HEAD:  normocephalic, atraumatic  EYES:  conjunctivae without injection, drainage or discharge  ENMT:  nasal mucosa moist; mouth moist without ulcerations or lesions; throat moist without erythema, exudate, ulcerations or lesions  NECK:  supple  CARDIAC:  regular rate and rhythm, normal S1 and S2, no murmurs, rubs or gallops  RESP:  tachypneic, subcostal retractions, lungs are clear to auscultation bilaterally; no rales or wheezes  ABDOMEN:  soft, nontender, nondistended  MUSCULOSKELETAL/NEURO: awake and alert, normal movement, normal tone, follows commands  SKIN:  normal skin color for age and race, well-perfused; warm and dry

## 2022-11-11 NOTE — H&P ADULT - NSICDXPASTSURGICALHX_GEN_ALL_CORE_FT
PAST SURGICAL HISTORY:  No significant past surgical history PAST SURGICAL HISTORY:  S/P cerebral aneurysm repair coiling

## 2022-11-11 NOTE — H&P ADULT - HISTORY OF PRESENT ILLNESS
Pt is a 76 y/o female with PMH of AML on chemo (follows with oncologist Dr. Marsh) treatment was 2 weeks ago.  Pt was brought to hospital for weakness, dizziness, vomiting and fever TMax 104 at home. As per daughter, pt has been fatigued the last 3 days associated with malaise and vomiting (dry heaves) that started today. No cough, congestion, abdominal pain, diarrhea or dysuria.  Patient laying in bed and resting, HOWEVER patient appears to have some difficulty with full sentences and appears SOB.  Pt denies dyspnea, and bedside pulse Ox 94% on RA and on 3 L %.  Pt denies chest pain, focal weakness, h/a, or recent infectious exposure. Pt is a 78 y/o female with PMH of AML on chemo (follows with oncologist Dr. Marsh) last treatment was 2 weeks ago.  Pt was brought to hospital for weakness, dizziness, vomiting and fever TMax 104 at home. As per daughter, pt has been fatigued the last 3 days associated with malaise and vomiting (dry heaves) that started today. No cough, congestion, abdominal pain, diarrhea or dysuria. with wheeze.   Patient laying in bed and resting, HOWEVER patient appears to have some difficulty with full sentences and appears SOB.  Pt denies dyspnea, and bedside pulse Ox 94% on RA and on 3 L %.  Pt denies chest pain, focal weakness, h/a, or recent infectious exposure.    Reports diagnosis of leukemia after first covid booster last december and recently received another covid booster last week.

## 2022-11-11 NOTE — H&P ADULT - NSHPPHYSICALEXAM_GEN_ALL_CORE
GENERAL:  78y/o Female NAD, resting comfortably on 3 l n/c oxygen  HEAD:  Atraumatic, Normocephalic  EYES: EOMI, PERRLA, conjunctiva and sclera clear  NECK: Supple, No JVD, no cervical lymphadenopathy, non-tender  CHEST/LUNG: + air entry bilaterally; No wheeze, rhonchi, or rales  HEART: Regular rate and rhythm; S1&S2  ABDOMEN: Soft, Nontender, Nondistended x 4 quadrants; Bowel sounds present  EXTREMITIES:   Peripheral Pulses Present, No clubbing, no cyanosis, or no edema, no calf tenderness  PSYCH: AAOx3, cooperative, appropriate  NEUROLOGY: WNL  SKIN: WNL GENERAL:  78y/o Female NAD, resting comfortably on 3 l n/c oxygen  HEAD:  Atraumatic, Normocephalic  EYES: EOMI, PERRLA, conjunctiva and sclera clear, pallor   NECK: Supple, No JVD, no cervical lymphadenopathy, non-tender  CHEST/LUNG:  diffuse decreased breath sounds.  No wheeze, rhonchi, or rales  HEART: Regular rate and rhythm; S1&S2  ABDOMEN: Soft, Nontender, periumbilical hernia Nondistended x 4 quadrants; Bowel sounds present  EXTREMITIES:   Peripheral Pulses Present, No clubbing, no cyanosis, or no edema, no calf tenderness  PSYCH: AAOx3, cooperative, appropriate  NEUROLOGY: WNL  SKIN: WNL

## 2022-11-11 NOTE — ED ADULT NURSE NOTE - NSFALLRSKUNASSIST_ED_ALL_ED
History of Present Illness: The patient is a 78 y o  male who presents with complaints of Low back pain  Patient Active Problem List   Diagnosis    Spinal stenosis, lumbar region, with neurogenic claudication    Lumbar radiculopathy    Low back pain    Chronic pain syndrome    Spondylolisthesis of lumbar region       Past Medical History:   Diagnosis Date    Hypertension        Past Surgical History:   Procedure Laterality Date    EPIDURAL BLOCK INJECTION N/A 5/10/2019    Procedure: L4 L5 lumbar Epidural Steroid Injection (41372); Surgeon: Divya Gallardo MD;  Location: [facility] MAIN OR;  Service: Pain Management        No current facility-administered medications for this encounter  No Known Allergies    Physical Exam:   Vitals:    04/16/21 0759   BP: 144/81   Pulse: 78   Resp: 20   Temp: (!) 96 6 °F (35 9 °C)   SpO2: 96%     General: Awake, Alert, Oriented x 3, Mood and affect appropriate  Respiratory: Respirations even and unlabored  Cardiovascular: Peripheral pulses intact; no edema  Musculoskeletal Exam:   Tenderness in lumbar spine region    ASA Score: 3    Patient/Chart Verification  Patient ID Verified: Verbal  ID Band Applied: Yes  Consents Confirmed: Procedural  H&P( within 30 days) Verified: Yes  Interval H&P(within 24 hr) Complete (required for Outpatients and Surgery Admit only): Yes  Beta Blocker given : N/A  Pre-op Lab/Test Results Available: In chart  Does Patient Have a Prosthetic Device/Implant: No    Assessment:   1   Chronic pain syndrome        Plan:  Proceed with L4-L5 lumbar epidural steroid injections no

## 2022-11-12 LAB
ABO RH CONFIRMATION: SIGNIFICANT CHANGE UP
ALBUMIN SERPL ELPH-MCNC: 2.8 G/DL — LOW (ref 3.5–5.2)
ALP SERPL-CCNC: 67 U/L — SIGNIFICANT CHANGE UP (ref 30–115)
ALT FLD-CCNC: 19 U/L — SIGNIFICANT CHANGE UP (ref 0–41)
ANION GAP SERPL CALC-SCNC: 12 MMOL/L — SIGNIFICANT CHANGE UP (ref 7–14)
AST SERPL-CCNC: 11 U/L — SIGNIFICANT CHANGE UP (ref 0–41)
BASOPHILS # BLD AUTO: 0 K/UL — SIGNIFICANT CHANGE UP (ref 0–0.2)
BASOPHILS NFR BLD AUTO: 0 % — SIGNIFICANT CHANGE UP (ref 0–1)
BILIRUB SERPL-MCNC: 1.1 MG/DL — SIGNIFICANT CHANGE UP (ref 0.2–1.2)
BLD GP AB SCN SERPL QL: SIGNIFICANT CHANGE UP
BUN SERPL-MCNC: 16 MG/DL — SIGNIFICANT CHANGE UP (ref 10–20)
CALCIUM SERPL-MCNC: 8.1 MG/DL — LOW (ref 8.4–10.5)
CHLORIDE SERPL-SCNC: 101 MMOL/L — SIGNIFICANT CHANGE UP (ref 98–110)
CO2 SERPL-SCNC: 26 MMOL/L — SIGNIFICANT CHANGE UP (ref 17–32)
CREAT SERPL-MCNC: 0.9 MG/DL — SIGNIFICANT CHANGE UP (ref 0.7–1.5)
CULTURE RESULTS: SIGNIFICANT CHANGE UP
EGFR: 67 ML/MIN/1.73M2 — SIGNIFICANT CHANGE UP
EOSINOPHIL # BLD AUTO: 0 K/UL — SIGNIFICANT CHANGE UP (ref 0–0.7)
EOSINOPHIL NFR BLD AUTO: 0 % — SIGNIFICANT CHANGE UP (ref 0–8)
GLUCOSE SERPL-MCNC: 131 MG/DL — HIGH (ref 70–99)
HCT VFR BLD CALC: 18.9 % — LOW (ref 37–47)
HCV AB S/CO SERPL IA: 0.04 COI — SIGNIFICANT CHANGE UP
HCV AB SERPL-IMP: SIGNIFICANT CHANGE UP
HGB BLD-MCNC: 6.2 G/DL — CRITICAL LOW (ref 12–16)
IMM GRANULOCYTES NFR BLD AUTO: 0 % — LOW (ref 0.1–0.3)
LACTATE SERPL-SCNC: 2.7 MMOL/L — HIGH (ref 0.7–2)
LEGIONELLA AG UR QL: NEGATIVE — SIGNIFICANT CHANGE UP
LYMPHOCYTES # BLD AUTO: 0.12 K/UL — LOW (ref 1.2–3.4)
LYMPHOCYTES # BLD AUTO: 46.2 % — SIGNIFICANT CHANGE UP (ref 20.5–51.1)
MCHC RBC-ENTMCNC: 31.6 PG — HIGH (ref 27–31)
MCHC RBC-ENTMCNC: 32.8 G/DL — SIGNIFICANT CHANGE UP (ref 32–37)
MCV RBC AUTO: 96.4 FL — SIGNIFICANT CHANGE UP (ref 81–99)
MONOCYTES # BLD AUTO: 0.03 K/UL — LOW (ref 0.1–0.6)
MONOCYTES NFR BLD AUTO: 11.5 % — HIGH (ref 1.7–9.3)
MRSA PCR RESULT.: NEGATIVE — SIGNIFICANT CHANGE UP
NEUTROPHILS # BLD AUTO: 0.11 K/UL — LOW (ref 1.4–6.5)
NEUTROPHILS NFR BLD AUTO: 42.3 % — SIGNIFICANT CHANGE UP (ref 42.2–75.2)
NRBC # BLD: 0 /100 WBCS — SIGNIFICANT CHANGE UP (ref 0–0)
PLATELET # BLD AUTO: 45 K/UL — LOW (ref 130–400)
POTASSIUM SERPL-MCNC: 3.5 MMOL/L — SIGNIFICANT CHANGE UP (ref 3.5–5)
POTASSIUM SERPL-SCNC: 3.5 MMOL/L — SIGNIFICANT CHANGE UP (ref 3.5–5)
PROCALCITONIN SERPL-MCNC: 20.1 NG/ML — HIGH (ref 0.02–0.1)
PROT SERPL-MCNC: 5 G/DL — LOW (ref 6–8)
RAPID RVP RESULT: SIGNIFICANT CHANGE UP
RBC # BLD: 1.96 M/UL — LOW (ref 4.2–5.4)
RBC # FLD: 20.3 % — HIGH (ref 11.5–14.5)
SARS-COV-2 RNA SPEC QL NAA+PROBE: SIGNIFICANT CHANGE UP
SODIUM SERPL-SCNC: 139 MMOL/L — SIGNIFICANT CHANGE UP (ref 135–146)
SPECIMEN SOURCE: SIGNIFICANT CHANGE UP
WBC # BLD: 0.26 K/UL — CRITICAL LOW (ref 4.8–10.8)
WBC # FLD AUTO: 0.26 K/UL — CRITICAL LOW (ref 4.8–10.8)

## 2022-11-12 PROCEDURE — 99232 SBSQ HOSP IP/OBS MODERATE 35: CPT

## 2022-11-12 RX ORDER — ACETAMINOPHEN 500 MG
650 TABLET ORAL ONCE
Refills: 0 | Status: DISCONTINUED | OUTPATIENT
Start: 2022-11-12 | End: 2022-11-21

## 2022-11-12 RX ADMIN — Medication 300 MILLIGRAM(S): at 12:07

## 2022-11-12 RX ADMIN — Medication 650 MILLIGRAM(S): at 11:12

## 2022-11-12 RX ADMIN — Medication 250 MILLIGRAM(S): at 05:17

## 2022-11-12 RX ADMIN — Medication 3 MILLILITER(S): at 09:18

## 2022-11-12 RX ADMIN — Medication 3 MILLILITER(S): at 14:44

## 2022-11-12 RX ADMIN — Medication 650 MILLIGRAM(S): at 05:17

## 2022-11-12 RX ADMIN — Medication 650 MILLIGRAM(S): at 20:30

## 2022-11-12 RX ADMIN — VORICONAZOLE 125 MILLIGRAM(S): 10 INJECTION, POWDER, LYOPHILIZED, FOR SOLUTION INTRAVENOUS at 12:07

## 2022-11-12 RX ADMIN — CEFEPIME 100 MILLIGRAM(S): 1 INJECTION, POWDER, FOR SOLUTION INTRAMUSCULAR; INTRAVENOUS at 17:08

## 2022-11-12 RX ADMIN — Medication 250 MILLIGRAM(S): at 18:11

## 2022-11-12 RX ADMIN — VORICONAZOLE 125 MILLIGRAM(S): 10 INJECTION, POWDER, LYOPHILIZED, FOR SOLUTION INTRAVENOUS at 20:46

## 2022-11-12 RX ADMIN — CEFEPIME 100 MILLIGRAM(S): 1 INJECTION, POWDER, FOR SOLUTION INTRAMUSCULAR; INTRAVENOUS at 23:07

## 2022-11-12 RX ADMIN — Medication 650 MILLIGRAM(S): at 11:41

## 2022-11-12 RX ADMIN — CEFEPIME 100 MILLIGRAM(S): 1 INJECTION, POWDER, FOR SOLUTION INTRAMUSCULAR; INTRAVENOUS at 11:41

## 2022-11-12 RX ADMIN — Medication 3 MILLILITER(S): at 20:42

## 2022-11-12 RX ADMIN — ENOXAPARIN SODIUM 40 MILLIGRAM(S): 100 INJECTION SUBCUTANEOUS at 22:50

## 2022-11-12 RX ADMIN — Medication 400 MILLIGRAM(S): at 12:07

## 2022-11-12 RX ADMIN — Medication 650 MILLIGRAM(S): at 05:47

## 2022-11-12 RX ADMIN — Medication 650 MILLIGRAM(S): at 19:26

## 2022-11-12 RX ADMIN — SODIUM CHLORIDE 100 MILLILITER(S): 9 INJECTION, SOLUTION INTRAVENOUS at 23:28

## 2022-11-12 NOTE — CONSULT NOTE ADULT - TIME BILLING
Initial consultation visit.  Examined the patient.  Reviewed the chart.  Coordinated care with Infectious Disease specialist.  If any further communication with me is needed, please call my office at [phone] or my Mobile # [phone]
I have personally seen and examined this patient.  I have reviewed all pertinent clinical information and reviewed all relevant imaging and diagnostic studies personally.   If possible, I counseled the patient about diagnostic testing and treatment plan.   I discussed my recommendations with the primary team.

## 2022-11-12 NOTE — PROGRESS NOTE ADULT - SUBJECTIVE AND OBJECTIVE BOX
[facility] [id number]    Subjective/Interval History   - no melena      ROS  - no cough, no SOB   - no dysuria     PHYSICAL EXAM  Vital Signs Last 24 Hrs  T(C): 37.7 (2022 14:32), Max: 39.2 (2022 05:29)  T(F): 99.9 (2022 14:32), Max: 102.5 (2022 05:29)  HR: 96 (2022 14:32) (96 - 119)  BP: 88/51 (2022 14:32) (88/51 - 146/63)  BP(mean): --  RR: 16 (2022 14:32) (16 - 24)  SpO2: 99% (2022 20:30) (98% - 99%)    Parameters below as of 2022 20:30  Patient On (Oxygen Delivery Method): nasal cannula  O2 Flow (L/min): 2    GA : AAOX3, NAD, obese   HEENT: PERRLA, EOMI  NECK: no JVD, no thyromegaly   CVS: S1 S2 no murmur no rubs no gallop  RESP:  no wheeze, no rhonchi, LLL rales  ABD: Soft, NT, ND, tympanic, no rebound or guarding   : No Waldron, No CVA tenderness   EXT; no pedal edema, no cyanosis  MSK: No ML spinal tenderness, normal ROM   NEURO: AAOX3, no new focal deficits     MEDICATIONS  (STANDING):  acetaminophen     Tablet .. 650 milliGRAM(s) Oral once  acyclovir   Oral Tab/Cap 400 milliGRAM(s) Oral daily  albuterol/ipratropium for Nebulization 3 milliLiter(s) Nebulizer every 6 hours  allopurinol 300 milliGRAM(s) Oral daily  cefepime   IVPB 2000 milliGRAM(s) IV Intermittent every 8 hours  enoxaparin Injectable 40 milliGRAM(s) SubCutaneous every 24 hours  lactated ringers. 1000 milliLiter(s) (100 mL/Hr) IV Continuous <Continuous>  vancomycin  IVPB 1000 milliGRAM(s) IV Intermittent every 12 hours  vancomycin  IVPB      voriconazole IVPB 250 milliGRAM(s) IV Intermittent every 12 hours    MEDICATIONS  (PRN):  acetaminophen     Tablet .. 650 milliGRAM(s) Oral every 6 hours PRN Temp greater or equal to 38C (100.4F), Mild Pain (1 - 3)  aluminum hydroxide/magnesium hydroxide/simethicone Suspension 30 milliLiter(s) Oral every 4 hours PRN Dyspepsia  ondansetron Injectable 4 milliGRAM(s) IV Push every 8 hours PRN Nausea and/or Vomiting      LABS/ IMAGING                        6.2    0.26  )-----------( 45       ( 2022 08:08 )             18.9             139  |  101  |  16  ----------------------------<  131<H>  3.5   |  26  |  0.9    Ca    8.1<L>      2022 08:08    TPro  5.0<L>  /  Alb  2.8<L>  /  TBili  1.1  /  DBili  x   /  AST  11  /  ALT  19  /  AlkPhos  67  11-12    CAPILLARY BLOOD GLUCOSE        Urinalysis Basic - ( 2022 17:10 )    Color: Yellow / Appearance: Slightly Cloudy / S.015 / pH: x  Gluc: x / Ketone: Negative  / Bili: Negative / Urobili: 1.0 mg/dL   Blood: x / Protein: 100 mg/dL / Nitrite: Negative   Leuk Esterase: Negative / RBC: 3-5 /HPF / WBC 6-10 /HPF   Sq Epi: x / Non Sq Epi: Many /HPF / Bacteria: Many      LIVER FUNCTIONS - ( 2022 08:08 )  Alb: 2.8 g/dL / Pro: 5.0 g/dL / ALK PHOS: 67 U/L / ALT: 19 U/L / AST: 11 U/L / GGT: x

## 2022-11-12 NOTE — PROGRESS NOTE ADULT - ASSESSMENT
Sepsis unclear etiology with Left lower lobe consolidation, c/f pneumonia, not c/w severe sepsis with severe neutropenic fever   - pending blood Cx & Urine Cx  - cont Vanco /Cefepime D2  for possible PNA/Sepsis, LR iv fluids   - cont  Voriconazole IV for possible fungal PNA D2  - initial LA wnl Monitor Lactate, Procal   - Legionella, Strep Ag pending    - MRSA swab,  RVP negative,  neutropenic precautions   - Oxygen prn n/c   - ID consult, Hematology consult, may need neupogen.  - pct 20.10      AML (acute lymphocytic leukemia).   ·  Plan: AML diagnosed approx 1 yr ago   - cont px acyclovir and allopurinol   - Hem/Onc following   - Follow HG  - Hg <8.0 consider transfusion  - T&S  - Monitor Vitals  - hold chemo for now    Pancytopenia  - per hem/onc transfuse 2 units or leukoreduced and irradiated prbcs  - will monitor     Weakness  - cont iv fluids    DVT px  -Lovenox     Full code.

## 2022-11-13 LAB
ANION GAP SERPL CALC-SCNC: 11 MMOL/L — SIGNIFICANT CHANGE UP (ref 7–14)
BASOPHILS # BLD AUTO: 0 K/UL — SIGNIFICANT CHANGE UP (ref 0–0.2)
BASOPHILS NFR BLD AUTO: 0 % — SIGNIFICANT CHANGE UP (ref 0–1)
BUN SERPL-MCNC: 17 MG/DL — SIGNIFICANT CHANGE UP (ref 10–20)
CALCIUM SERPL-MCNC: 8.2 MG/DL — LOW (ref 8.4–10.5)
CHLORIDE SERPL-SCNC: 101 MMOL/L — SIGNIFICANT CHANGE UP (ref 98–110)
CMV IGG FLD QL: >10 U/ML — HIGH
CMV IGG SERPL-IMP: POSITIVE
CMV IGM FLD-ACNC: 9.7 AU/ML — SIGNIFICANT CHANGE UP
CMV IGM SERPL QL: NEGATIVE — SIGNIFICANT CHANGE UP
CO2 SERPL-SCNC: 25 MMOL/L — SIGNIFICANT CHANGE UP (ref 17–32)
CREAT SERPL-MCNC: 0.8 MG/DL — SIGNIFICANT CHANGE UP (ref 0.7–1.5)
CULTURE RESULTS: SIGNIFICANT CHANGE UP
EBV EA AB SER IA-ACNC: 27.5 U/ML — HIGH
EBV EA AB TITR SER IF: POSITIVE
EBV EA IGG SER-ACNC: POSITIVE
EBV NA IGG SER IA-ACNC: 254 U/ML — HIGH
EBV PATRN SPEC IB-IMP: SIGNIFICANT CHANGE UP
EBV VCA IGG AVIDITY SER QL IA: POSITIVE
EBV VCA IGM SER IA-ACNC: 190 U/ML — HIGH
EBV VCA IGM SER IA-ACNC: <10 U/ML — SIGNIFICANT CHANGE UP
EBV VCA IGM TITR FLD: NEGATIVE — SIGNIFICANT CHANGE UP
EGFR: 77 ML/MIN/1.73M2 — SIGNIFICANT CHANGE UP
EOSINOPHIL # BLD AUTO: 0 K/UL — SIGNIFICANT CHANGE UP (ref 0–0.7)
EOSINOPHIL NFR BLD AUTO: 0 % — SIGNIFICANT CHANGE UP (ref 0–8)
GLUCOSE SERPL-MCNC: 125 MG/DL — HIGH (ref 70–99)
HCT VFR BLD CALC: 21.7 % — LOW (ref 37–47)
HGB BLD-MCNC: 7.6 G/DL — LOW (ref 12–16)
IMM GRANULOCYTES NFR BLD AUTO: 0 % — LOW (ref 0.1–0.3)
LYMPHOCYTES # BLD AUTO: 0.13 K/UL — LOW (ref 1.2–3.4)
LYMPHOCYTES # BLD AUTO: 33.3 % — SIGNIFICANT CHANGE UP (ref 20.5–51.1)
MCHC RBC-ENTMCNC: 32.1 PG — HIGH (ref 27–31)
MCHC RBC-ENTMCNC: 35 G/DL — SIGNIFICANT CHANGE UP (ref 32–37)
MCV RBC AUTO: 91.6 FL — SIGNIFICANT CHANGE UP (ref 81–99)
MONOCYTES # BLD AUTO: 0.05 K/UL — LOW (ref 0.1–0.6)
MONOCYTES NFR BLD AUTO: 12.8 % — HIGH (ref 1.7–9.3)
NEUTROPHILS # BLD AUTO: 0.21 K/UL — LOW (ref 1.4–6.5)
NEUTROPHILS NFR BLD AUTO: 53.9 % — SIGNIFICANT CHANGE UP (ref 42.2–75.2)
NRBC # BLD: 0 /100 WBCS — SIGNIFICANT CHANGE UP (ref 0–0)
PLATELET # BLD AUTO: 50 K/UL — LOW (ref 130–400)
POTASSIUM SERPL-MCNC: 3.1 MMOL/L — LOW (ref 3.5–5)
POTASSIUM SERPL-SCNC: 3.1 MMOL/L — LOW (ref 3.5–5)
RBC # BLD: 2.37 M/UL — LOW (ref 4.2–5.4)
RBC # FLD: 19.7 % — HIGH (ref 11.5–14.5)
S PNEUM AG UR QL: NEGATIVE — SIGNIFICANT CHANGE UP
SODIUM SERPL-SCNC: 137 MMOL/L — SIGNIFICANT CHANGE UP (ref 135–146)
SPECIMEN SOURCE: SIGNIFICANT CHANGE UP
WBC # BLD: 0.39 K/UL — CRITICAL LOW (ref 4.8–10.8)
WBC # FLD AUTO: 0.39 K/UL — CRITICAL LOW (ref 4.8–10.8)

## 2022-11-13 PROCEDURE — 99232 SBSQ HOSP IP/OBS MODERATE 35: CPT

## 2022-11-13 RX ORDER — POTASSIUM CHLORIDE 20 MEQ
40 PACKET (EA) ORAL ONCE
Refills: 0 | Status: COMPLETED | OUTPATIENT
Start: 2022-11-13 | End: 2022-11-13

## 2022-11-13 RX ORDER — POTASSIUM CHLORIDE 20 MEQ
20 PACKET (EA) ORAL
Refills: 0 | Status: COMPLETED | OUTPATIENT
Start: 2022-11-13 | End: 2022-11-13

## 2022-11-13 RX ADMIN — Medication 300 MILLIGRAM(S): at 12:18

## 2022-11-13 RX ADMIN — ENOXAPARIN SODIUM 40 MILLIGRAM(S): 100 INJECTION SUBCUTANEOUS at 21:24

## 2022-11-13 RX ADMIN — Medication 3 MILLILITER(S): at 14:08

## 2022-11-13 RX ADMIN — Medication 650 MILLIGRAM(S): at 19:40

## 2022-11-13 RX ADMIN — CEFEPIME 100 MILLIGRAM(S): 1 INJECTION, POWDER, FOR SOLUTION INTRAMUSCULAR; INTRAVENOUS at 08:32

## 2022-11-13 RX ADMIN — Medication 400 MILLIGRAM(S): at 12:18

## 2022-11-13 RX ADMIN — CEFEPIME 100 MILLIGRAM(S): 1 INJECTION, POWDER, FOR SOLUTION INTRAMUSCULAR; INTRAVENOUS at 23:03

## 2022-11-13 RX ADMIN — Medication 3 MILLILITER(S): at 09:10

## 2022-11-13 RX ADMIN — Medication 650 MILLIGRAM(S): at 04:45

## 2022-11-13 RX ADMIN — Medication 250 MILLIGRAM(S): at 18:00

## 2022-11-13 RX ADMIN — Medication 650 MILLIGRAM(S): at 05:57

## 2022-11-13 RX ADMIN — Medication 20 MILLIEQUIVALENT(S): at 19:29

## 2022-11-13 RX ADMIN — Medication 250 MILLIGRAM(S): at 06:20

## 2022-11-13 RX ADMIN — CEFEPIME 100 MILLIGRAM(S): 1 INJECTION, POWDER, FOR SOLUTION INTRAMUSCULAR; INTRAVENOUS at 16:00

## 2022-11-13 RX ADMIN — Medication 40 MILLIEQUIVALENT(S): at 12:19

## 2022-11-13 RX ADMIN — Medication 20 MILLIEQUIVALENT(S): at 21:25

## 2022-11-13 RX ADMIN — Medication 650 MILLIGRAM(S): at 20:53

## 2022-11-13 RX ADMIN — VORICONAZOLE 125 MILLIGRAM(S): 10 INJECTION, POWDER, LYOPHILIZED, FOR SOLUTION INTRAVENOUS at 19:41

## 2022-11-13 RX ADMIN — Medication 3 MILLILITER(S): at 20:21

## 2022-11-13 RX ADMIN — VORICONAZOLE 125 MILLIGRAM(S): 10 INJECTION, POWDER, LYOPHILIZED, FOR SOLUTION INTRAVENOUS at 08:32

## 2022-11-13 NOTE — PHYSICAL THERAPY INITIAL EVALUATION ADULT - GENERAL OBSERVATIONS, REHAB EVAL
11:15 -11:45 Chart reviewed. Order received.  Patient available to be seen for Pt, confirmed with RN. pt encountered  Semi camejo in the bed and left Sitting at [facility]  denies pain, and agrees to participate in session, +RUE IV , + O2 2 LPM via NC , + Prima fit  ,NAD.

## 2022-11-13 NOTE — PROGRESS NOTE ADULT - SUBJECTIVE AND OBJECTIVE BOX
INTERVAL HPI/OVERNIGHT EVENTS:  Patient S&E at bedside. No o/n events,   Patient feeling good.  Patient's daughter at bed side.  Patient has no fever.  No specific complaints.    VITAL SIGNS:  T(F): 97.2 (22 @ 06:12)  HR: 105 (22 @ 05:17)  BP: 130/61 (22 @ 05:17)  RR: 17 (22 @ 05:17)  SpO2: 97% (22 @ 22:17)  Wt(kg): --    PHYSICAL EXAM:    Constitutional: NAD  Eyes: EOMI, sclera non-icteric  Neck: supple, no masses, no JVD  Respiratory: CTA b/l, good air entry b/l  Cardiovascular: RRR, no M/R/G  Gastrointestinal: soft, NTND, no masses palpable, + BS, no hepatosplenomegaly  Extremities: no c/c/e  Neurological: AAOx3      MEDICATIONS  (STANDING):  acetaminophen     Tablet .. 650 milliGRAM(s) Oral once  acyclovir   Oral Tab/Cap 400 milliGRAM(s) Oral daily  albuterol/ipratropium for Nebulization 3 milliLiter(s) Nebulizer every 6 hours  allopurinol 300 milliGRAM(s) Oral daily  cefepime   IVPB 2000 milliGRAM(s) IV Intermittent every 8 hours  enoxaparin Injectable 40 milliGRAM(s) SubCutaneous every 24 hours  lactated ringers. 1000 milliLiter(s) (100 mL/Hr) IV Continuous <Continuous>  potassium chloride    Tablet ER 40 milliEquivalent(s) Oral once  vancomycin  IVPB      vancomycin  IVPB 1000 milliGRAM(s) IV Intermittent every 12 hours  voriconazole IVPB 250 milliGRAM(s) IV Intermittent every 12 hours    MEDICATIONS  (PRN):  acetaminophen     Tablet .. 650 milliGRAM(s) Oral every 6 hours PRN Temp greater or equal to 38C (100.4F), Mild Pain (1 - 3)  aluminum hydroxide/magnesium hydroxide/simethicone Suspension 30 milliLiter(s) Oral every 4 hours PRN Dyspepsia  ondansetron Injectable 4 milliGRAM(s) IV Push every 8 hours PRN Nausea and/or Vomiting      Allergies    No Known Allergies    Intolerances        LABS:                        7.6    0.39  )-----------( 50       ( 2022 08:05 )             21.7         137  |  101  |  17  ----------------------------<  125<H>  3.1<L>   |  25  |  0.8    Ca    8.2<L>      2022 08:05    TPro  5.0<L>  /  Alb  2.8<L>  /  TBili  1.1  /  DBili  x   /  AST  11  /  ALT  19  /  AlkPhos  67  -12    PT/INR - ( 2022 15:09 )   PT: 15.60 sec;   INR: 1.36 ratio         PTT - ( 2022 15:09 )  PTT:33.2 sec  Urinalysis Basic - ( 2022 17:10 )    Color: Yellow / Appearance: Slightly Cloudy / S.015 / pH: x  Gluc: x / Ketone: Negative  / Bili: Negative / Urobili: 1.0 mg/dL   Blood: x / Protein: 100 mg/dL / Nitrite: Negative   Leuk Esterase: Negative / RBC: 3-5 /HPF / WBC 6-10 /HPF   Sq Epi: x / Non Sq Epi: Many /HPF / Bacteria: Many        RADIOLOGY & ADDITIONAL TESTS:  Studies reviewed.

## 2022-11-13 NOTE — PROGRESS NOTE ADULT - SUBJECTIVE AND OBJECTIVE BOX
[id number]    Subjective/Interval History   no cough SOB   S/P 2 units of prbcs   - fever of 100.2     ROS  - no chest pain     PHYSICAL EXAM  Vital Signs Last 24 Hrs  T(C): 37.9 (13 Nov 2022 13:44), Max: 39.3 (13 Nov 2022 05:17)  T(F): 100.2 (13 Nov 2022 13:44), Max: 102.8 (13 Nov 2022 05:17)  HR: 116 (13 Nov 2022 13:44) (91 - 116)  BP: 114/58 (13 Nov 2022 13:44) (96/53 - 130/61)  BP(mean): --  RR: 17 (13 Nov 2022 13:44) (16 - 17)  SpO2: 97% (12 Nov 2022 22:17) (97% - 98%)    Parameters below as of 12 Nov 2022 22:17  Patient On (Oxygen Delivery Method): nasal cannula  O2 Flow (L/min): 2    GA : AAOX3, NAD   HEENT: PERRLA, EOMI  NECK: no JVD, no thyromegaly   CVS: S1 S2 no murmur no rubs no gallop  RESP:   no wheeze, no rhonchi, crackles LLL>RLL rales  ABD: Soft, NT, ND, tympanic, no rebound or guarding   : No Waldron, No CVA tenderness   EXT; +1 b/L pedal edema, no cyanosis  MSK: No ML spinal tenderness, normal ROM   NEURO: AAOX3, no new focal deficits     MEDICATIONS  (STANDING):  acetaminophen     Tablet .. 650 milliGRAM(s) Oral once  acyclovir   Oral Tab/Cap 400 milliGRAM(s) Oral daily  albuterol/ipratropium for Nebulization 3 milliLiter(s) Nebulizer every 6 hours  allopurinol 300 milliGRAM(s) Oral daily  cefepime   IVPB 2000 milliGRAM(s) IV Intermittent every 8 hours  enoxaparin Injectable 40 milliGRAM(s) SubCutaneous every 24 hours  lactated ringers. 1000 milliLiter(s) (100 mL/Hr) IV Continuous <Continuous>  vancomycin  IVPB      vancomycin  IVPB 1000 milliGRAM(s) IV Intermittent every 12 hours  voriconazole IVPB 250 milliGRAM(s) IV Intermittent every 12 hours    MEDICATIONS  (PRN):  acetaminophen     Tablet .. 650 milliGRAM(s) Oral every 6 hours PRN Temp greater or equal to 38C (100.4F), Mild Pain (1 - 3)  aluminum hydroxide/magnesium hydroxide/simethicone Suspension 30 milliLiter(s) Oral every 4 hours PRN Dyspepsia  ondansetron Injectable 4 milliGRAM(s) IV Push every 8 hours PRN Nausea and/or Vomiting      LABS/ IMAGING                        7.6    0.39  )-----------( 50       ( 13 Nov 2022 08:05 )             21.7         11-13    137  |  101  |  17  ----------------------------<  125<H>  3.1<L>   |  25  |  0.8    Ca    8.2<L>      13 Nov 2022 08:05    TPro  5.0<L>  /  Alb  2.8<L>  /  TBili  1.1  /  DBili  x   /  AST  11  /  ALT  19  /  AlkPhos  67  11-12    CAPILLARY BLOOD GLUCOSE          LIVER FUNCTIONS - ( 12 Nov 2022 08:08 )  Alb: 2.8 g/dL / Pro: 5.0 g/dL / ALK PHOS: 67 U/L / ALT: 19 U/L / AST: 11 U/L / GGT: x             Babesia microti PCR, Blood. (collected 12 Nov 2022 14:15)  Source: .Blood None  Final Report (13 Nov 2022 08:54):    Babesia microti PCR    Results: NOT detected    ***************Result Note*************    The detection of Babesia microti by PCR has only been    validated for whole blood; this test has not been approved    by the US Food and Drug Administration (FDA). Performance    characteristics of this assay have been determined by    [employer]. The clinical significance    of results should be considered in conjunction with the    overall clinical presentation of the patient. Result is not    intended to be used as the sole means for clinical diagnosis    or patient management decisions.    One negative sample does not necessarily rule    out the presence of a parasitic infection.    Culture - Urine (collected 11 Nov 2022 17:10)  Source: Clean Catch Clean Catch (Midstream)  Final Report (12 Nov 2022 19:12):    <10,000 CFU/mL Normal Urogenital Patricia    Culture - Blood (collected 11 Nov 2022 15:09)  Source: .Blood Blood-Peripheral  Preliminary Report (12 Nov 2022 23:01):    No growth to date.    Culture - Blood (collected 11 Nov 2022 15:09)  Source: .Blood Blood-Peripheral  Preliminary Report (12 Nov 2022 23:01):    No growth to date.

## 2022-11-13 NOTE — PROGRESS NOTE ADULT - ASSESSMENT
Sepsis unclear etiology with Left lower lobe consolidation, c/f pneumonia, not c/w severe sepsis with severe neutropenic fever   - blood Cx & Urine Cx NGTD   - cont Vanco /Cefepime D3  for possible PNA/Sepsis, LR iv fluids   - cont  Voriconazole IV for possible fungal PNA D3  - initial LA wnl Monitor Lactate, Procal   - Legionella, Strep Ag pending    - MRSA swab,  RVP negative,  neutropenic precautions   - Oxygen prn n/c   - ID consult, Hematology consult, may need neupogen.  - pct 20.10   - d/w ID case mgmt consult for prior auth fo posaconazole   - pt still febrile, per hem/onc dionisio for discharge, possibly on levaquin and posaconazole      AML (acute lymphocytic leukemia) s/p 2 units   ·  Plan: AML diagnosed approx 1 yr ago   - cont px acyclovir and allopurinol   - Hem/Onc following   - Follow HG  - if Hg <8.0 consider transfusion  - T&S  - Monitor Vitals  - hold chemo for now    Pancytopenia  - per hem/onc transfuse 2 units or leukoreduced and irradiated prbcs  - will monitor     Weakness  - cont iv fluids, PT recommends DAVID     DVT px  -Lovenox     Full code.

## 2022-11-13 NOTE — PROGRESS NOTE ADULT - ASSESSMENT
Pancytopenia secondary to chemotherapy.  Neutropenic fever improved.  AML, on maintenance chemotherapy.  Hypokalemia.      Please repeat chest X-ray, PA and lateral with deep inspiration.  Replace Potassium.  Please discuss with ID to find out whether patient can go home on oral anti-biotics, Levoflaxin.

## 2022-11-13 NOTE — PHYSICAL THERAPY INITIAL EVALUATION ADULT - PERTINENT HX OF CURRENT PROBLEM, REHAB EVAL
76 y/o female with PMH of AML on chemo (follows with oncologist Dr. Marsh) last treatment was 2 weeks ago.  Pt was brought to hospital for weakness, dizziness, vomiting and fever TMax 104 at home. As per daughter, pt has been fatigued the last 3 days associated with malaise and vomiting (dry heaves) that started today. No cough, congestion, abdominal pain, diarrhea or dysuria. with wheeze.

## 2022-11-13 NOTE — PHYSICAL THERAPY INITIAL EVALUATION ADULT - ADDITIONAL COMMENTS
pt is 76 y/o  F  , lives with   in  , information obtain from the pt herself  , has 5  steps to enter with BL  HR and  15 steps to the bedroom and bathroom upstairs w/ Chair lift   , as per pt  She was independent using RW with bed mobility , transfer , ambulation ,stair negotiation and basic ADLS PTA and Use RW when she feels week. but recently from past 10 days she was not able to move around and getup  of the chair  and need physical help

## 2022-11-14 ENCOUNTER — TRANSCRIPTION ENCOUNTER (OUTPATIENT)
Age: 77
End: 2022-11-14

## 2022-11-14 LAB
ANION GAP SERPL CALC-SCNC: 11 MMOL/L — SIGNIFICANT CHANGE UP (ref 7–14)
BASOPHILS # BLD AUTO: 0.01 K/UL — SIGNIFICANT CHANGE UP (ref 0–0.2)
BASOPHILS NFR BLD AUTO: 1.4 % — HIGH (ref 0–1)
BUN SERPL-MCNC: 13 MG/DL — SIGNIFICANT CHANGE UP (ref 10–20)
CALCIUM SERPL-MCNC: 8.4 MG/DL — SIGNIFICANT CHANGE UP (ref 8.4–10.5)
CHLORIDE SERPL-SCNC: 101 MMOL/L — SIGNIFICANT CHANGE UP (ref 98–110)
CMV DNA CSF QL NAA+PROBE: SIGNIFICANT CHANGE UP
CMV DNA SPEC NAA+PROBE-LOG#: SIGNIFICANT CHANGE UP LOG10IU/ML
CO2 SERPL-SCNC: 26 MMOL/L — SIGNIFICANT CHANGE UP (ref 17–32)
CREAT SERPL-MCNC: 0.7 MG/DL — SIGNIFICANT CHANGE UP (ref 0.7–1.5)
EGFR: 90 ML/MIN/1.73M2 — SIGNIFICANT CHANGE UP
EOSINOPHIL # BLD AUTO: 0 K/UL — SIGNIFICANT CHANGE UP (ref 0–0.7)
EOSINOPHIL NFR BLD AUTO: 0 % — SIGNIFICANT CHANGE UP (ref 0–8)
FUNGITELL: <31 PG/ML — SIGNIFICANT CHANGE UP
GLUCOSE SERPL-MCNC: 141 MG/DL — HIGH (ref 70–99)
HCT VFR BLD CALC: 22.1 % — LOW (ref 37–47)
HGB BLD-MCNC: 7.7 G/DL — LOW (ref 12–16)
IMM GRANULOCYTES NFR BLD AUTO: 7 % — HIGH (ref 0.1–0.3)
LACTATE SERPL-SCNC: 2.6 MMOL/L — HIGH (ref 0.7–2)
LYMPHOCYTES # BLD AUTO: 0.17 K/UL — LOW (ref 1.2–3.4)
LYMPHOCYTES # BLD AUTO: 23.9 % — SIGNIFICANT CHANGE UP (ref 20.5–51.1)
MCHC RBC-ENTMCNC: 32.4 PG — HIGH (ref 27–31)
MCHC RBC-ENTMCNC: 34.8 G/DL — SIGNIFICANT CHANGE UP (ref 32–37)
MCV RBC AUTO: 92.9 FL — SIGNIFICANT CHANGE UP (ref 81–99)
MONOCYTES # BLD AUTO: 0.05 K/UL — LOW (ref 0.1–0.6)
MONOCYTES NFR BLD AUTO: 7 % — SIGNIFICANT CHANGE UP (ref 1.7–9.3)
NEUTROPHILS # BLD AUTO: 0.43 K/UL — LOW (ref 1.4–6.5)
NEUTROPHILS NFR BLD AUTO: 60.7 % — SIGNIFICANT CHANGE UP (ref 42.2–75.2)
NRBC # BLD: 0 /100 WBCS — SIGNIFICANT CHANGE UP (ref 0–0)
PLATELET # BLD AUTO: 61 K/UL — LOW (ref 130–400)
POTASSIUM SERPL-MCNC: 3.2 MMOL/L — LOW (ref 3.5–5)
POTASSIUM SERPL-SCNC: 3.2 MMOL/L — LOW (ref 3.5–5)
RBC # BLD: 2.38 M/UL — LOW (ref 4.2–5.4)
RBC # FLD: 20.1 % — HIGH (ref 11.5–14.5)
SODIUM SERPL-SCNC: 138 MMOL/L — SIGNIFICANT CHANGE UP (ref 135–146)
VANCOMYCIN TROUGH SERPL-MCNC: 36.2 UG/ML — HIGH (ref 5–10)
WBC # BLD: 0.71 K/UL — CRITICAL LOW (ref 4.8–10.8)
WBC # FLD AUTO: 0.71 K/UL — CRITICAL LOW (ref 4.8–10.8)

## 2022-11-14 PROCEDURE — 99232 SBSQ HOSP IP/OBS MODERATE 35: CPT

## 2022-11-14 PROCEDURE — 71046 X-RAY EXAM CHEST 2 VIEWS: CPT | Mod: 26

## 2022-11-14 RX ORDER — ACETAMINOPHEN 500 MG
2 TABLET ORAL
Qty: 0 | Refills: 0 | DISCHARGE
Start: 2022-11-14

## 2022-11-14 RX ORDER — POTASSIUM CHLORIDE 20 MEQ
20 PACKET (EA) ORAL DAILY
Refills: 0 | Status: DISCONTINUED | OUTPATIENT
Start: 2022-11-14 | End: 2022-11-18

## 2022-11-14 RX ORDER — SODIUM CHLORIDE 9 MG/ML
1000 INJECTION, SOLUTION INTRAVENOUS
Refills: 0 | Status: DISCONTINUED | OUTPATIENT
Start: 2022-11-14 | End: 2022-11-17

## 2022-11-14 RX ADMIN — CEFEPIME 100 MILLIGRAM(S): 1 INJECTION, POWDER, FOR SOLUTION INTRAMUSCULAR; INTRAVENOUS at 08:07

## 2022-11-14 RX ADMIN — CEFEPIME 100 MILLIGRAM(S): 1 INJECTION, POWDER, FOR SOLUTION INTRAMUSCULAR; INTRAVENOUS at 16:13

## 2022-11-14 RX ADMIN — Medication 3 MILLILITER(S): at 14:41

## 2022-11-14 RX ADMIN — ENOXAPARIN SODIUM 40 MILLIGRAM(S): 100 INJECTION SUBCUTANEOUS at 22:56

## 2022-11-14 RX ADMIN — Medication 250 MILLIGRAM(S): at 17:18

## 2022-11-14 RX ADMIN — Medication 20 MILLIEQUIVALENT(S): at 11:15

## 2022-11-14 RX ADMIN — Medication 650 MILLIGRAM(S): at 12:33

## 2022-11-14 RX ADMIN — Medication 3 MILLILITER(S): at 21:17

## 2022-11-14 RX ADMIN — Medication 650 MILLIGRAM(S): at 05:08

## 2022-11-14 RX ADMIN — VORICONAZOLE 125 MILLIGRAM(S): 10 INJECTION, POWDER, LYOPHILIZED, FOR SOLUTION INTRAVENOUS at 10:16

## 2022-11-14 RX ADMIN — Medication 650 MILLIGRAM(S): at 23:55

## 2022-11-14 RX ADMIN — Medication 650 MILLIGRAM(S): at 12:03

## 2022-11-14 RX ADMIN — Medication 250 MILLIGRAM(S): at 06:56

## 2022-11-14 RX ADMIN — CEFEPIME 100 MILLIGRAM(S): 1 INJECTION, POWDER, FOR SOLUTION INTRAMUSCULAR; INTRAVENOUS at 23:56

## 2022-11-14 RX ADMIN — Medication 300 MILLIGRAM(S): at 11:15

## 2022-11-14 RX ADMIN — VORICONAZOLE 125 MILLIGRAM(S): 10 INJECTION, POWDER, LYOPHILIZED, FOR SOLUTION INTRAVENOUS at 21:49

## 2022-11-14 RX ADMIN — Medication 3 MILLILITER(S): at 07:37

## 2022-11-14 RX ADMIN — Medication 400 MILLIGRAM(S): at 11:15

## 2022-11-14 NOTE — DISCHARGE NOTE PROVIDER - CARE PROVIDER_API CALL
Baron Kaur (DO)  Internal Medicine  [address] [place] [id number]  Phone: [phone]  Fax: [phone]  Follow Up Time: 1 week   Baron Kaur (DO)  Internal Medicine  [facility] [place] [address]  Phone: [phone]  Fax: [phone]  Follow Up Time: 1 week    Hemanth Quezada)  Critical Care Medicine; Internal Medicine; Pulmonary Disease  [address] [place] [address], [facility] [address]  [place] [address]  Phone: [phone]  Fax: [phone]  Follow Up Time: 1 week    Dr Marsh,   Phone: (   )    -  Fax: (   )    -  Follow Up Time: 1 week

## 2022-11-14 NOTE — DISCHARGE NOTE PROVIDER - NSDCFUADDINST_GEN_ALL_CORE_FT
Ambulate with assistance and as tolerated  - Return to Emergency room if develop any persistent fever > 101, chills, tremors, persistent nausea/vomiting, severe pain not relieved by pain medication, inability to urinate, chest pains, difficulty breathing or any bleeding.

## 2022-11-14 NOTE — DISCHARGE NOTE PROVIDER - NSDCCPCAREPLAN_GEN_ALL_CORE_FT
PRINCIPAL DISCHARGE DIAGNOSIS  Diagnosis: Neutropenic fever  Assessment and Plan of Treatment: Patient consulted with Heme/onc and infectious disease. Patient provided with IV Cefepime and voriconazole during hospital stay. Patient to be discharged on Levaquin and posaconazole as per infectious disease       PRINCIPAL DISCHARGE DIAGNOSIS  Diagnosis: Sepsis  Assessment and Plan of Treatment: Treated and resolved  due to Left lower lobe consolidation, c/f pneumonia possibly fungal, suspected GNR PNA  please continue antibiotic as directed  Rx will be sent to pharmacy  please follow up with PCP, pulmonary and oncology for reassessment

## 2022-11-14 NOTE — DISCHARGE NOTE PROVIDER - HOSPITAL COURSE
76 y/o female with PMH of AML on chemo (follows with oncologist Dr. Marsh) last treatment was 2 weeks ago. Patient presented to the ED with weakness, dizziness, vomiting and a fever of 104 max at home. Patient's daughter stated that patient has experienced fatigue for the pass 3 days with vomiting that started on the day of admission. Patient denied cough, congestion, abdominal pain, diarrhea or recent infectious exposure. Patient presented with SOB and wheezing with the inability to complete full sentences. Patient states she was diagnosed with leukemia after receiving the first covid booster last December and also received another booster last week. During hospital stay patient consulted with hematology/oncology with recommendations to repeat chest X-ray which presented with mild pulmonary congestion, potassium repletion, and follow up with infectious disease for possible home ABX administration -- Levaquin. Infectious disease recommends Empiric Voriconazole 6 mg/kg twice daily IV for 2 doses, then 4 mg/kg twice daily IV, Cefepime 2g q8h IV, with a F/U of send CMV IgM/IgG, CMV serum PCR, EBV IgM/IgG, babesia PCR for possible hepatosplenomegaly and pancytopenia during hospital stay. Patient to be discharged with levaquin and posaconazole.        78 y/o female with PMH of AML on chemo (follows with oncologist Dr. Marsh) last treatment was 2 weeks ago. Patient presented to the ED with weakness, dizziness, vomiting and a fever of 104 max at home. Patient's daughter stated that patient has experienced fatigue for the pass 3 days with vomiting that started on the day of admission. Patient denied cough, congestion, abdominal pain, diarrhea or recent infectious exposure. Patient presented with SOB and wheezing with the inability to complete full sentences. Patient states she was diagnosed with leukemia after receiving the first covid booster last December and also received another booster last week. During hospital stay patient consulted with hematology/oncology with recommendations to repeat chest X-ray which presented with mild pulmonary congestion, potassium repletion, and follow up with infectious disease for possible home ABX administration -- Levaquin. Infectious disease recommends Empiric Voriconazole 6 mg/kg twice daily IV for 2 doses, then 4 mg/kg twice daily IV, Cefepime 2g q8h IV, with a F/U of send CMV IgM/IgG, CMV serum PCR, EBV IgM/IgG, babesia PCR for possible hepatosplenomegaly and pancytopenia during hospital stay.   Patient to be discharged home with Levaquin and voriconazole.        78 y/o female with PMH of AML on chemo (follows with oncologist Dr. Marsh) last treatment was 2 weeks ago. Patient presented to the ED with weakness, dizziness, vomiting and a fever of 104 max at home. Patient's daughter stated that patient has experienced fatigue for the pass 3 days with vomiting that started on the day of admission. Patient denied cough, congestion, abdominal pain, diarrhea or recent infectious exposure. Patient presented with SOB and wheezing with the inability to complete full sentences. Patient states she was diagnosed with leukemia after receiving the first covid booster last December and also received another booster last week. During hospital stay patient consulted with hematology/oncology with recommendations to repeat chest X-ray which presented with mild pulmonary congestion, potassium repletion, and follow up with infectious disease for possible home ABX administration -- Levaquin. Infectious disease recommends Empiric Voriconazole 6 mg/kg twice daily IV for 2 doses, then 4 mg/kg twice daily IV, Cefepime 2g q8h IV, with a F/U of send CMV IgM/IgG, CMV serum PCR, EBV IgM/IgG, babesia PCR for possible hepatosplenomegaly and pancytopenia during hospital stay.   Patient improved and today she is medically stable for a hospital dc home on Levofloxacin x 3 days and outpatient follow up with PCP, pulmonary and hematology.

## 2022-11-14 NOTE — DISCHARGE NOTE PROVIDER - NSDCMRMEDTOKEN_GEN_ALL_CORE_FT
acetaminophen 325 mg oral tablet: 2 tab(s) orally every 6 hours, As needed, Temp greater or equal to 38C (100.4F), Mild Pain (1 - 3)  acetaminophen 325 mg oral tablet: 2 tab(s) orally once  acyclovir 400 mg oral tablet: 1 tab(s) orally once a day  allopurinol 300 mg oral tablet: 1 tab(s) orally once a day  potassium chloride 20 mEq oral tablet, extended release: 1 tab(s) orally once a day   acyclovir 400 mg oral tablet: 1 tab(s) orally once a day  allopurinol 300 mg oral tablet: 1 tab(s) orally once a day  levoFLOXacin 500 mg oral tablet: 1 tab(s) orally once a day   potassium chloride 20 mEq oral tablet, extended release: 1 tab(s) orally once a day

## 2022-11-14 NOTE — DISCHARGE NOTE PROVIDER - PROVIDER TOKENS
PROVIDER:[TOKEN:[[id number]],FOLLOWUP:[1 week]] PROVIDER:[TOKEN:[[id number]],FOLLOWUP:[1 week]],PROVIDER:[TOKEN:[[id number]],FOLLOWUP:[1 week]],FREE:[LAST:[Dr Marsh],PHONE:[(   )    -],FAX:[(   )    -],FOLLOWUP:[1 week]]

## 2022-11-14 NOTE — DISCHARGE NOTE PROVIDER - ATTENDING DISCHARGE PHYSICAL EXAMINATION:
Gen- elderly F, appears younger than stated age, NAD  Eyes- anicteric sclera, non injected conjunctiva, EOMI  Chest- symmetrical chest rise, no accessory muscle use  Cardiac- non tachycardic  Abodminal- non distended  Ext- spontaneously moving all 4 ext against gravity  Skin- no visible rashes or overlying changes

## 2022-11-14 NOTE — PROGRESS NOTE ADULT - SUBJECTIVE AND OBJECTIVE BOX
MARAL [id number]    Subjective/Interval History   pt had fever, no cough or SOB on RA     ROS  no chest pain no dysuria     PHYSICAL EXAM  Vital Signs Last 24 Hrs  T(C): 37.9 (14 Nov 2022 13:56), Max: 38.4 (14 Nov 2022 05:00)  T(F): 100.2 (14 Nov 2022 13:56), Max: 101.2 (14 Nov 2022 05:00)  HR: 106 (14 Nov 2022 13:56) (101 - 112)  BP: 103/52 (14 Nov 2022 13:56) (103/52 - 117/57)  BP(mean): --  RR: 16 (14 Nov 2022 13:56) (16 - 18)  SpO2: 95% (14 Nov 2022 08:09) (95% - 96%)    Parameters below as of 14 Nov 2022 08:09  Patient On (Oxygen Delivery Method): room air      GA : AAOX3, NAD   HEENT: PERRLA, EOMI  NECK: no JVD, no thyromegaly   CVS: S1 S2 no murmur no rubs no gallop  RESP:  no wheeze, no rhonchi left lower lobe  rales  ABD: Soft, NT, ND, tympanic, no rebound or guarding   : No Waldron, No CVA tenderness   EXT; no pedal edema, no cyanosis  MSK: No ML spinal tenderness, normal ROM   NEURO: AAOX3, no new focal deficits     MEDICATIONS  (STANDING):  acetaminophen     Tablet .. 650 milliGRAM(s) Oral once  acyclovir   Oral Tab/Cap 400 milliGRAM(s) Oral daily  albuterol/ipratropium for Nebulization 3 milliLiter(s) Nebulizer every 6 hours  allopurinol 300 milliGRAM(s) Oral daily  cefepime   IVPB 2000 milliGRAM(s) IV Intermittent every 8 hours  enoxaparin Injectable 40 milliGRAM(s) SubCutaneous every 24 hours  potassium chloride    Tablet ER 20 milliEquivalent(s) Oral daily  sodium chloride 0.45%. 1000 milliLiter(s) (75 mL/Hr) IV Continuous <Continuous>  vancomycin  IVPB      vancomycin  IVPB 1000 milliGRAM(s) IV Intermittent every 12 hours  voriconazole IVPB 250 milliGRAM(s) IV Intermittent every 12 hours    MEDICATIONS  (PRN):  acetaminophen     Tablet .. 650 milliGRAM(s) Oral every 6 hours PRN Temp greater or equal to 38C (100.4F), Mild Pain (1 - 3)  aluminum hydroxide/magnesium hydroxide/simethicone Suspension 30 milliLiter(s) Oral every 4 hours PRN Dyspepsia  ondansetron Injectable 4 milliGRAM(s) IV Push every 8 hours PRN Nausea and/or Vomiting      LABS/ IMAGING                        7.7    0.71  )-----------( 61       ( 14 Nov 2022 08:14 )             22.1         11-14    138  |  101  |  13  ----------------------------<  141<H>  3.2<L>   |  26  |  0.7    Ca    8.4      14 Nov 2022 08:14      CAPILLARY BLOOD GLUCOSE              Babesia microti PCR, Blood. (collected 12 Nov 2022 14:15)  Source: .Blood None  Final Report (13 Nov 2022 08:54):    Babesia microti PCR    Results: NOT detected    ***************Result Note*************    The detection of Babesia microti by PCR has only been    validated for whole blood; this test has not been approved    by the US Food and Drug Administration (FDA). Performance    characteristics of this assay have been determined by    [employer]. The clinical significance    of results should be considered in conjunction with the    overall clinical presentation of the patient. Result is not    intended to be used as the sole means for clinical diagnosis    or patient management decisions.    One negative sample does not necessarily rule    out the presence of a parasitic infection.

## 2022-11-14 NOTE — PROGRESS NOTE ADULT - ASSESSMENT
Sepsis unclear etiology with Left lower lobe consolidation, c/f pneumonia, not c/w severe sepsis with severe neutropenic fever   - blood Cx & Urine Cx NGTD   - cont Vanco /Cefepime D4  for possible PNA/Sepsis, LR iv fluids   - cont  Voriconazole IV for possible fungal PNA D4  - initial LA wnl Monitor Lactate, Procal   - Legionella, Strep Ag pending    - MRSA swab,  RVP negative,  neutropenic precautions   - Oxygen prn n/c   - ID consult, Hematology consult, may need neupogen.  - pct 20.10   - d/w ID case mgmt consult for prior auth fo posaconazole   - pt still febrile, per hem/onc dionisio for discharge, possibly on levaquin and posaconazole      AML (acute lymphocytic leukemia) s/p 2 units   ·  Plan: AML diagnosed approx 1 yr ago   - cont px acyclovir and allopurinol   - Hem/Onc following   - Follow HG  - if Hg <8.0 consider transfusion  - T&S  - Monitor Vitals  - hold chemo for now    Pancytopenia  - per hem/onc transfuse 2 units or leukoreduced and irradiated prbcs  - will monitor     Weakness  - cont iv fluids, PT recommends DAVID     DVT px  -Lovenox     Full code.

## 2022-11-15 LAB — VANCOMYCIN FLD-MCNC: 5 UG/ML — SIGNIFICANT CHANGE UP (ref 5–10)

## 2022-11-15 PROCEDURE — 99233 SBSQ HOSP IP/OBS HIGH 50: CPT

## 2022-11-15 RX ORDER — VORICONAZOLE 10 MG/ML
388 INJECTION, POWDER, LYOPHILIZED, FOR SOLUTION INTRAVENOUS
Refills: 0 | Status: DISCONTINUED | OUTPATIENT
Start: 2022-11-15 | End: 2022-11-21

## 2022-11-15 RX ADMIN — Medication 3 MILLILITER(S): at 14:31

## 2022-11-15 RX ADMIN — Medication 300 MILLIGRAM(S): at 13:43

## 2022-11-15 RX ADMIN — Medication 650 MILLIGRAM(S): at 00:30

## 2022-11-15 RX ADMIN — ENOXAPARIN SODIUM 40 MILLIGRAM(S): 100 INJECTION SUBCUTANEOUS at 20:56

## 2022-11-15 RX ADMIN — Medication 400 MILLIGRAM(S): at 13:44

## 2022-11-15 RX ADMIN — SODIUM CHLORIDE 75 MILLILITER(S): 9 INJECTION, SOLUTION INTRAVENOUS at 20:54

## 2022-11-15 RX ADMIN — Medication 650 MILLIGRAM(S): at 19:20

## 2022-11-15 RX ADMIN — Medication 3 MILLILITER(S): at 01:10

## 2022-11-15 RX ADMIN — Medication 3 MILLILITER(S): at 07:11

## 2022-11-15 RX ADMIN — Medication 20 MILLIEQUIVALENT(S): at 11:37

## 2022-11-15 RX ADMIN — CEFEPIME 100 MILLIGRAM(S): 1 INJECTION, POWDER, FOR SOLUTION INTRAMUSCULAR; INTRAVENOUS at 13:42

## 2022-11-15 RX ADMIN — Medication 3 MILLILITER(S): at 20:43

## 2022-11-15 RX ADMIN — VORICONAZOLE 125 MILLIGRAM(S): 10 INJECTION, POWDER, LYOPHILIZED, FOR SOLUTION INTRAVENOUS at 09:30

## 2022-11-15 RX ADMIN — Medication 650 MILLIGRAM(S): at 17:03

## 2022-11-15 RX ADMIN — SODIUM CHLORIDE 75 MILLILITER(S): 9 INJECTION, SOLUTION INTRAVENOUS at 10:09

## 2022-11-15 RX ADMIN — CEFEPIME 100 MILLIGRAM(S): 1 INJECTION, POWDER, FOR SOLUTION INTRAMUSCULAR; INTRAVENOUS at 20:53

## 2022-11-15 RX ADMIN — VORICONAZOLE 125 MILLIGRAM(S): 10 INJECTION, POWDER, LYOPHILIZED, FOR SOLUTION INTRAVENOUS at 20:56

## 2022-11-15 NOTE — PROGRESS NOTE ADULT - SUBJECTIVE AND OBJECTIVE BOX
[facility] [id number]    Subjective/Interval History       ROS    PHYSICAL EXAM  Vital Signs Last 24 Hrs  T(C): 37.6 (15 Nov 2022 13:47), Max: 38.6 (14 Nov 2022 23:51)  T(F): 99.6 (15 Nov 2022 13:47), Max: 101.4 (14 Nov 2022 23:51)  HR: 111 (15 Nov 2022 13:47) (90 - 117)  BP: 121/59 (15 Nov 2022 13:47) (108/53 - 126/56)  BP(mean): --  RR: 16 (15 Nov 2022 13:47) (16 - 18)  SpO2: 97% (15 Nov 2022 11:11) (88% - 97%)    Parameters below as of 15 Nov 2022 11:11  Patient On (Oxygen Delivery Method): nasal cannula  O2 Flow (L/min): 2    GA : AAOX3, NAD   HEENT: PERRLA, EOMI  NECK: no JVD, no thyromegaly   CVS: S1 S2 no murmur no rubs no gallop  RESP: CTAB no wheeze, no rhonchi no rales  ABD: Soft, NT, ND, tympanic, no rebound or guarding   : No Waldron, No CVA tenderness   EXT; no pedal edema, no cyanosis  MSK: No ML spinal tenderness, normal ROM   NEURO: AAOX3, no new focal deficits     MEDICATIONS  (STANDING):  acetaminophen     Tablet .. 650 milliGRAM(s) Oral once  acyclovir   Oral Tab/Cap 400 milliGRAM(s) Oral daily  albuterol/ipratropium for Nebulization 3 milliLiter(s) Nebulizer every 6 hours  allopurinol 300 milliGRAM(s) Oral daily  cefepime   IVPB 2000 milliGRAM(s) IV Intermittent every 8 hours  enoxaparin Injectable 40 milliGRAM(s) SubCutaneous every 24 hours  potassium chloride    Tablet ER 20 milliEquivalent(s) Oral daily  sodium chloride 0.45%. 1000 milliLiter(s) (75 mL/Hr) IV Continuous <Continuous>  vancomycin  IVPB      vancomycin  IVPB 1000 milliGRAM(s) IV Intermittent every 12 hours  voriconazole IVPB 250 milliGRAM(s) IV Intermittent every 12 hours    MEDICATIONS  (PRN):  acetaminophen     Tablet .. 650 milliGRAM(s) Oral every 6 hours PRN Temp greater or equal to 38C (100.4F), Mild Pain (1 - 3)  aluminum hydroxide/magnesium hydroxide/simethicone Suspension 30 milliLiter(s) Oral every 4 hours PRN Dyspepsia  ondansetron Injectable 4 milliGRAM(s) IV Push every 8 hours PRN Nausea and/or Vomiting      LABS/ IMAGING                        7.7    0.71  )-----------( 61       ( 14 Nov 2022 08:14 )             22.1         11-14    138  |  101  |  13  ----------------------------<  141<H>  3.2<L>   |  26  |  0.7    Ca    8.4      14 Nov 2022 08:14      CAPILLARY BLOOD GLUCOSE                   MARAL [id number]    Subjective/Interval History   yellowish green cough, SOB improving   with weakness.   Tmax 100.8 F   Desaturated off o2 today   Not on home o2     ROS  no chest pain     PHYSICAL EXAM  Vital Signs Last 24 Hrs  T(C): 37.6 (15 Nov 2022 13:47), Max: 38.6 (14 Nov 2022 23:51)  T(F): 99.6 (15 Nov 2022 13:47), Max: 101.4 (14 Nov 2022 23:51)  HR: 111 (15 Nov 2022 13:47) (90 - 117)  BP: 121/59 (15 Nov 2022 13:47) (108/53 - 126/56)  BP(mean): --  RR: 16 (15 Nov 2022 13:47) (16 - 18)  SpO2: 97% (15 Nov 2022 11:11) (88% - 97%)    Parameters below as of 15 Nov 2022 11:11  Patient On (Oxygen Delivery Method): nasal cannula  O2 Flow (L/min): 2    GA : AAOX3, NAD   HEENT: PERRLA, EOMI  NECK: no JVD, no thyromegaly   CVS: S1 S2 no murmur no rubs no gallop  RESP:   no wheeze, no rhonchi, LLL  rales  ABD: Soft, NT, ND, tympanic, no rebound or guarding   : No Waldron, No CVA tenderness   EXT; no pedal edema, no cyanosis  MSK: No ML spinal tenderness, normal ROM   NEURO: AAOX3, no new focal deficits     MEDICATIONS  (STANDING):  acetaminophen     Tablet .. 650 milliGRAM(s) Oral once  acyclovir   Oral Tab/Cap 400 milliGRAM(s) Oral daily  albuterol/ipratropium for Nebulization 3 milliLiter(s) Nebulizer every 6 hours  allopurinol 300 milliGRAM(s) Oral daily  cefepime   IVPB 2000 milliGRAM(s) IV Intermittent every 8 hours  enoxaparin Injectable 40 milliGRAM(s) SubCutaneous every 24 hours  potassium chloride    Tablet ER 20 milliEquivalent(s) Oral daily  sodium chloride 0.45%. 1000 milliLiter(s) (75 mL/Hr) IV Continuous <Continuous>  vancomycin  IVPB      vancomycin  IVPB 1000 milliGRAM(s) IV Intermittent every 12 hours  voriconazole IVPB 250 milliGRAM(s) IV Intermittent every 12 hours    MEDICATIONS  (PRN):  acetaminophen     Tablet .. 650 milliGRAM(s) Oral every 6 hours PRN Temp greater or equal to 38C (100.4F), Mild Pain (1 - 3)  aluminum hydroxide/magnesium hydroxide/simethicone Suspension 30 milliLiter(s) Oral every 4 hours PRN Dyspepsia  ondansetron Injectable 4 milliGRAM(s) IV Push every 8 hours PRN Nausea and/or Vomiting      LABS/ IMAGING                        7.7    0.71  )-----------( 61       ( 14 Nov 2022 08:14 )             22.1         11-14    138  |  101  |  13  ----------------------------<  141<H>  3.2<L>   |  26  |  0.7    Ca    8.4      14 Nov 2022 08:14      CAPILLARY BLOOD GLUCOSE                   MARAL [id number]    Subjective/Interval History   yellowish green cough, SOB improving   with weakness.   Tmax 100.8 F   Desaturated off o2 today   Not on home o2     ROS  no chest pain     PHYSICAL EXAM  Vital Signs Last 24 Hrs  T(C): 37.6 (15 Nov 2022 13:47), Max: 38.6 (14 Nov 2022 23:51)  T(F): 99.6 (15 Nov 2022 13:47), Max: 101.4 (14 Nov 2022 23:51)  HR: 111 (15 Nov 2022 13:47) (90 - 117)  BP: 121/59 (15 Nov 2022 13:47) (108/53 - 126/56)  BP(mean): --  RR: 16 (15 Nov 2022 13:47) (16 - 18)  SpO2: 97% (15 Nov 2022 11:11) (88% - 97%)    Parameters below as of 15 Nov 2022 11:11  Patient On (Oxygen Delivery Method): nasal cannula  O2 Flow (L/min): 2    GA : AAOX3, NAD   HEENT: PERRLA, EOMI  NECK: no JVD, no thyromegaly   CVS: S1 S2 no murmur no rubs no gallop  RESP:   no wheeze, no rhonchi, LLL  rales  ABD: Soft, NT, ND, tympanic, no rebound or guarding   : No Waldron, No CVA tenderness   EXT; +1 b/l pedal edema, no cyanosis  MSK: No ML spinal tenderness, normal ROM   NEURO: AAOX3, no new focal deficits     MEDICATIONS  (STANDING):  acetaminophen     Tablet .. 650 milliGRAM(s) Oral once  acyclovir   Oral Tab/Cap 400 milliGRAM(s) Oral daily  albuterol/ipratropium for Nebulization 3 milliLiter(s) Nebulizer every 6 hours  allopurinol 300 milliGRAM(s) Oral daily  cefepime   IVPB 2000 milliGRAM(s) IV Intermittent every 8 hours  enoxaparin Injectable 40 milliGRAM(s) SubCutaneous every 24 hours  potassium chloride    Tablet ER 20 milliEquivalent(s) Oral daily  sodium chloride 0.45%. 1000 milliLiter(s) (75 mL/Hr) IV Continuous <Continuous>  vancomycin  IVPB      vancomycin  IVPB 1000 milliGRAM(s) IV Intermittent every 12 hours  voriconazole IVPB 250 milliGRAM(s) IV Intermittent every 12 hours    MEDICATIONS  (PRN):  acetaminophen     Tablet .. 650 milliGRAM(s) Oral every 6 hours PRN Temp greater or equal to 38C (100.4F), Mild Pain (1 - 3)  aluminum hydroxide/magnesium hydroxide/simethicone Suspension 30 milliLiter(s) Oral every 4 hours PRN Dyspepsia  ondansetron Injectable 4 milliGRAM(s) IV Push every 8 hours PRN Nausea and/or Vomiting      LABS/ IMAGING                        7.7    0.71  )-----------( 61       ( 14 Nov 2022 08:14 )             22.1         11-14    138  |  101  |  13  ----------------------------<  141<H>  3.2<L>   |  26  |  0.7    Ca    8.4      14 Nov 2022 08:14      CAPILLARY BLOOD GLUCOSE

## 2022-11-15 NOTE — PROGRESS NOTE ADULT - ASSESSMENT
Sepsis unclear etiology with Left lower lobe consolidation, c/f pneumonia, not c/w severe sepsis with severe neutropenic fever   - blood Cx & Urine Cx NGTD   - cont Vanco /Cefepime D4  for possible PNA/Sepsis, LR iv fluids   - cont  Voriconazole IV for possible fungal PNA D4  - initial LA wnl Monitor Lactate, Procal   - Legionella, Strep Ag pending    - MRSA swab,  RVP negative,  neutropenic precautions   - Oxygen prn n/c   - ID consult, Hematology consult, may need neupogen.  - pct 20.10   - d/w ID case mgmt consult for prior auth fo posaconazole   - pt still febrile, per hem/onc dionisio for discharge, possibly on levaquin and posaconazole      AML (acute lymphocytic leukemia) s/p 2 units   ·  Plan: AML diagnosed approx 1 yr ago   - cont px acyclovir and allopurinol   - Hem/Onc following   - Follow HG  - if Hg <8.0 consider transfusion  - T&S  - Monitor Vitals  - hold chemo for now    Pancytopenia  - per hem/onc transfuse 2 units or leukoreduced and irradiated prbcs  - will monitor     Weakness  - cont iv fluids, PT recommends DAVID     DVT px  -Lovenox     Full code. Sepsis due to Left lower lobe consolidation, c/f pneumonia possibly fungal, not c/w severe sepsis with severe neutropenic fever   - blood Cx & Urine Cx NGTD   - cont Vanco /Cefepime D5, will stop vancomycin today., LR iv fluids   - cont  Voriconazole IV for possible fungal PNA d5 redosed 4 mg/kg bid for 5 more days,   - initial LA wnl Monitor Lactate,  - Legionella, Strep Ag pending    - MRSA swab,  RVP negative,  neutropenic precautions   - Oxygen prn n/c   - ID consult, Hematology consult, may need neupogen.  - pct 20.10   - d/w ID case mgmt consult for prior auth fo posaconazole,  patient was not approved for oral antifungal   - ANC gradually improving      AML (acute lymphocytic leukemia) s/p 2 units   ·  Plan: AML diagnosed approx 1 yr ago   - cont px with acyclovir and allopurinol   - Hem/Onc following   - Follow HG  - if Hg <7.0 consider transfusion  - T&S  - Monitor Vitals  - hold chemo for now    Pancytopenia  - per hem/onc transfuse 2 units or leukoreduced and irradiated prbcs  - will monitor     Weakness  - cont iv fluids, PT recommends DAVID, per hematology oncology they recommend home health PT, will need to d/w son Dustin     DVT px  -Lovenox     Full code. Sepsis due to Left lower lobe consolidation, c/f pneumonia possibly fungal, not c/w severe sepsis with severe neutropenic fever   - blood Cx & Urine Cx NGTD   - cont Vanco /Cefepime D5, will stop vancomycin today., LR iv fluids   - cont  Voriconazole IV for possible fungal PNA d5 redosed 4 mg/kg bid for 5 more days,   - initial LA wnl Monitor Lactate,  - Legionella, Strep Ag pending    - MRSA swab,  RVP negative,  neutropenic precautions   - Oxygen prn n/c   - ID consult, Hematology consult, may need neupogen.  - pct 20.10   - d/w ID case mgmt consult for prior auth fo posaconazole,  patient was not approved for oral antifungal   - ANC gradually improving   - pending serum galactomannan      AML (acute lymphocytic leukemia) s/p 2 units   ·  Plan: AML diagnosed approx 1 yr ago   - cont px with acyclovir and allopurinol   - Hem/Onc following   - Follow HG  - if Hg <7.0 consider transfusion  - T&S  - Monitor Vitals  - hold chemo for now    Pancytopenia  - per hem/onc transfuse 2 units or leukoreduced and irradiated prbcs  - will monitor     Weakness  - cont iv fluids, PT recommends DAVID, per hematology oncology they recommend home health PT, will need to d/w son Dustin     DVT px  -Lovenox     Full code.

## 2022-11-15 NOTE — PROGRESS NOTE ADULT - ASSESSMENT
ASSESSMENT  78 y/o female with PMH of AML on chemo (follows with oncologist Dr. Marsh) last treatment was 2 weeks ago.  Pt was brought to hospital for weakness, dizziness, vomiting and fever TMax 104 at home. As per daughter, pt has been fatigued the last 3 days associated with malaise and vomiting (dry heaves) that started today.     IMPRESSION  #Neutropenic fever with Severe sepsis on admission T>101 P>90 WBC < 4 lactic acidosis -- IFI? vs Bacterial pneumonia     Given CT chest finding of focal opacity and pt is neutropenic and not on antifungal ppx cannot rule out IFI         Port- clean    No real localizing symptoms     UA without significant pyuria     Rapid RVP Result: NotDetec (11-12-22 @ 06:06)    MRSA PCR Result.: Negative (11-12-22 @ 06:04)    CT Chest  The central tracheobronchial tree is patent: There is a focal opacity in the left infrahilar region, medially and  posteriorly in the left lower lobe. Margins are ill-defined. This may   represent focal area of pneumonia, although tumor cannot be excluded. In  the absence of previous scans for comparison, a PET/CT or a short-term  follow-up CT scan in 3 months is recommended.  Mild reticular opacities  are noted at the lung bases. There is no pleural effusion. There is no   pneumothorax.    Fungitell: <31  (11.11.22 @ 21:00)          #CT Mild hepatosplenomegaly.  - Babesia PCR negative  - CMV PCR negative  - EBV IgG+    #AML on chemo   #Morbid Obesity BMI (kg/m2): 36.7  Creatinine, Serum: 0.9 (11-12-22 @ 08:08)    Weight (kg): 97.069 (11-11-22 @ 21:39)    RECOMMENDATIONS  - Blood Cx negative and MRSA nares negative -- vancomycin levels reviewed, but hold on further vancomycin dosing   - check serum galactomannan   - continue cefepime 2g q 8 hours   - difficult to rule out IFI based on CT imaging -- with focal opacity by left infrahilar region -- does not appear cavitary at this point but unclear if this is early in presentation   -- to help with diagnostic evaluation, pulm evaluation to see if amenable for bronch with BAL fungal cultures and galactomannan   - Cefepime 2g q8h IV  - Empiric Voriconazole 6 mg/kg twice daily IV for 2 doses, then 4 mg/kg twice daily IV -- will need level 5 days after therapy   - trend fever curve  - ACV ppx     Please call or message on [employer] Teams if with any questions.  Spectra [phone]

## 2022-11-15 NOTE — PROGRESS NOTE ADULT - SUBJECTIVE AND OBJECTIVE BOX
SHERWIN, MARAL  77y, Female  Allergy: No Known Allergies      LOS  4d    CHIEF COMPLAINT: Fever (14 Nov 2022 18:54)      INTERVAL EVENTS/HPI  - No acute events overnight  - T(F): , Max: 101.4 (11-14-22 @ 23:51)  - febrile last night   - reports left sided pleuritic chest pain improved  - WBC Count: 0.71 (11-14-22 @ 08:14)  WBC Count: 0.39 (11-13-22 @ 08:05)     - Creatinine, Serum: 0.7 (11-14-22 @ 08:14)       ROS  General: Denies rigors, nightsweats  HEENT: Denies headache, rhinorrhea, sore throat, eye pain  CV: Denies CP, palpitations  PULM: Denies wheezing, hemoptysis  GI: Denies hematemesis, hematochezia, melena  : Denies discharge, hematuria  MSK: Denies arthralgias, myalgias  SKIN: Denies rash, lesions  NEURO: Denies paresthesias, weakness  PSYCH: Denies depression, anxiety    VITALS:  T(F): 97.1, Max: 101.4 (11-14-22 @ 23:51)  HR: 90  BP: 108/53  RR: 18Vital Signs Last 24 Hrs  T(C): 36.2 (15 Nov 2022 04:09), Max: 38.6 (14 Nov 2022 23:51)  T(F): 97.1 (15 Nov 2022 04:09), Max: 101.4 (14 Nov 2022 23:51)  HR: 90 (15 Nov 2022 04:09) (90 - 117)  BP: 108/53 (15 Nov 2022 04:09) (103/52 - 126/56)  BP(mean): --  RR: 18 (15 Nov 2022 04:09) (16 - 18)  SpO2: 95% (14 Nov 2022 23:52) (88% - 95%)    Parameters below as of 14 Nov 2022 23:52  Patient On (Oxygen Delivery Method): nasal cannula  O2 Flow (L/min): 2      PHYSICAL EXAM:  Gen: NAD, resting in bed  HEENT: Normocephalic, atraumatic  Neck: supple, no lymphadenopathy  CV: Regular rate & regular rhythm  Lungs: decreased BS at bases, no fremitus  Abdomen: Soft, BS present  Ext: Warm, well perfused  Neuro: non focal, awake  Skin: no rash, no erythema  Lines: no phlebitis    FH: Non-contributory  Social Hx: Non-contributory    TESTS & MEASUREMENTS:                        7.7    0.71  )-----------( 61       ( 14 Nov 2022 08:14 )             22.1     11-14    138  |  101  |  13  ----------------------------<  141<H>  3.2<L>   |  26  |  0.7    Ca    8.4      14 Nov 2022 08:14              Babesia microti PCR, Blood. (collected 11-12-22 @ 14:15)  Source: .Blood None  Final Report (11-13-22 @ 08:54):    Babesia microti PCR    Results: NOT detected    ***************Result Note*************    The detection of Babesia microti by PCR has only been    validated for whole blood; this test has not been approved    by the US Food and Drug Administration (FDA). Performance    characteristics of this assay have been determined by    [employer]. The clinical significance    of results should be considered in conjunction with the    overall clinical presentation of the patient. Result is not    intended to be used as the sole means for clinical diagnosis    or patient management decisions.    One negative sample does not necessarily rule    out the presence of a parasitic infection.    Culture - Urine (collected 11-11-22 @ 17:10)  Source: Clean Catch Clean Catch (Midstream)  Final Report (11-12-22 @ 19:12):    <10,000 CFU/mL Normal Urogenital Patricia    Culture - Blood (collected 11-11-22 @ 15:09)  Source: .Blood Blood-Peripheral  Preliminary Report (11-12-22 @ 23:01):    No growth to date.    Culture - Blood (collected 11-11-22 @ 15:09)  Source: .Blood Blood-Peripheral  Preliminary Report (11-12-22 @ 23:01):    No growth to date.        Lactate, Blood: 2.6 mmol/L (11-14-22 @ 11:30)  Lactate, Blood: 2.7 mmol/L (11-12-22 @ 08:08)  Blood Gas Venous - Lactate: 1.40 mmol/L (11-11-22 @ 15:45)  Lactate, Blood: 1.4 mmol/L (11-11-22 @ 15:09)      INFECTIOUS DISEASES TESTING  Procalcitonin, Serum: 20.10 (11-12-22 @ 08:08)  Legionella Antigen, Urine: Negative (11-12-22 @ 07:00)  Rapid RVP Result: NotDetec (11-12-22 @ 06:06)  MRSA PCR Result.: Negative (11-12-22 @ 06:04)  Fungitell: <31 (11-11-22 @ 21:00)      INFLAMMATORY MARKERS      RADIOLOGY & ADDITIONAL TESTS:  I have personally reviewed the last available Chest xray  CXR  Xray Chest 2 Views PA/Lat:   ACC: [id number] EXAM:  XR CHEST PA LAT 2V                          PROCEDURE DATE:  11/14/2022          INTERPRETATION:  Clinical History / Reason for exam: Pneumonia    Comparison : Chest radiograph November 11, 2022.    Technique/Positioning: AP chest.    Findings:    Support devices: Right sided portacatheter with tip in the region of the   distal superior vena cava.    Cardiac/mediastinum/hilum: No change    Lung parenchyma/Pleura: Increasing left lower lobe opacity. Right   perihilar linear opacity.    Skeleton/soft tissues: No change    Impression:    Increasing left lower lobe opacity right perihilar linear opacity.   Continued follow-up recommended.        --- End of Report ---            GENESIS GIBBS MD; Attending Radiologist  This document has been electronically signed. Nov 14 2022  2:38PM (11-14-22 @ 09:47)      CT      CARDIOLOGY TESTING  12 Lead ECG:   Ventricular Rate 121 BPM    Atrial Rate 170 BPM    QRS Duration 78 ms    Q-T Interval 434 ms    QTC Calculation(Bazett) 616 ms    R Axis 15 degrees    T Axis 51 degrees    Diagnosis Line Sinus tachycardia  Nonspecific ST and T wave abnormality  Abnormal ECG    Confirmed by MIRIAM MERCER MD ([id number]) on 11/11/2022 3:06:08 PM (11-11-22 @ 14:30)      MEDICATIONS  acetaminophen     Tablet .. 650 Oral once  acyclovir   Oral Tab/Cap 400 Oral daily  albuterol/ipratropium for Nebulization 3 Nebulizer every 6 hours  allopurinol 300 Oral daily  cefepime   IVPB 2000 IV Intermittent every 8 hours  enoxaparin Injectable 40 SubCutaneous every 24 hours  potassium chloride    Tablet ER 20 Oral daily  sodium chloride 0.45%. 1000 IV Continuous <Continuous>  vancomycin  IVPB     vancomycin  IVPB 1000 IV Intermittent every 12 hours  voriconazole IVPB 250 IV Intermittent every 12 hours      WEIGHT  Weight (kg): 97.069 (11-11-22 @ 21:39)      ANTIBIOTICS:  acyclovir   Oral Tab/Cap 400 milliGRAM(s) Oral daily  cefepime   IVPB 2000 milliGRAM(s) IV Intermittent every 8 hours  vancomycin  IVPB      vancomycin  IVPB 1000 milliGRAM(s) IV Intermittent every 12 hours  voriconazole IVPB 250 milliGRAM(s) IV Intermittent every 12 hours      All available historical records have been reviewed

## 2022-11-16 LAB
ANION GAP SERPL CALC-SCNC: 10 MMOL/L — SIGNIFICANT CHANGE UP (ref 7–14)
ANISOCYTOSIS BLD QL: SLIGHT — SIGNIFICANT CHANGE UP
BASOPHILS # BLD AUTO: 0.01 K/UL — SIGNIFICANT CHANGE UP (ref 0–0.2)
BASOPHILS NFR BLD AUTO: 0.5 % — SIGNIFICANT CHANGE UP (ref 0–1)
BUN SERPL-MCNC: 12 MG/DL — SIGNIFICANT CHANGE UP (ref 10–20)
CALCIUM SERPL-MCNC: 7.9 MG/DL — LOW (ref 8.4–10.5)
CHLORIDE SERPL-SCNC: 99 MMOL/L — SIGNIFICANT CHANGE UP (ref 98–110)
CO2 SERPL-SCNC: 29 MMOL/L — SIGNIFICANT CHANGE UP (ref 17–32)
CREAT SERPL-MCNC: 0.7 MG/DL — SIGNIFICANT CHANGE UP (ref 0.7–1.5)
CULTURE RESULTS: SIGNIFICANT CHANGE UP
CULTURE RESULTS: SIGNIFICANT CHANGE UP
DACRYOCYTES BLD QL SMEAR: SLIGHT — SIGNIFICANT CHANGE UP
EGFR: 90 ML/MIN/1.73M2 — SIGNIFICANT CHANGE UP
EOSINOPHIL # BLD AUTO: 0 K/UL — SIGNIFICANT CHANGE UP (ref 0–0.7)
EOSINOPHIL NFR BLD AUTO: 0 % — SIGNIFICANT CHANGE UP (ref 0–8)
GLUCOSE SERPL-MCNC: 117 MG/DL — HIGH (ref 70–99)
HCT VFR BLD CALC: 23.4 % — LOW (ref 37–47)
HGB BLD-MCNC: 7.9 G/DL — LOW (ref 12–16)
IMM GRANULOCYTES NFR BLD AUTO: 9 % — HIGH (ref 0.1–0.3)
LACTATE SERPL-SCNC: 0.8 MMOL/L — SIGNIFICANT CHANGE UP (ref 0.7–2)
LYMPHOCYTES # BLD AUTO: 0.25 K/UL — LOW (ref 1.2–3.4)
LYMPHOCYTES # BLD AUTO: 12.5 % — LOW (ref 20.5–51.1)
MACROCYTES BLD QL: SLIGHT — SIGNIFICANT CHANGE UP
MAGNESIUM SERPL-MCNC: 1.5 MG/DL — LOW (ref 1.8–2.4)
MANUAL SMEAR VERIFICATION: SIGNIFICANT CHANGE UP
MCHC RBC-ENTMCNC: 31.3 PG — HIGH (ref 27–31)
MCHC RBC-ENTMCNC: 33.8 G/DL — SIGNIFICANT CHANGE UP (ref 32–37)
MCV RBC AUTO: 92.9 FL — SIGNIFICANT CHANGE UP (ref 81–99)
METAMYELOCYTES # FLD: 4 % — HIGH (ref 0–0)
MICROCYTES BLD QL: SLIGHT — SIGNIFICANT CHANGE UP
MONOCYTES # BLD AUTO: 0.11 K/UL — SIGNIFICANT CHANGE UP (ref 0.1–0.6)
MONOCYTES NFR BLD AUTO: 5.5 % — SIGNIFICANT CHANGE UP (ref 1.7–9.3)
NEUTROPHILS # BLD AUTO: 1.45 K/UL — SIGNIFICANT CHANGE UP (ref 1.4–6.5)
NEUTROPHILS NFR BLD AUTO: 72.5 % — SIGNIFICANT CHANGE UP (ref 42.2–75.2)
NEUTS BAND # BLD: 6 % — SIGNIFICANT CHANGE UP (ref 0–6)
NRBC # BLD: 0 /100 WBCS — SIGNIFICANT CHANGE UP (ref 0–0)
NRBC # BLD: 0 /100 — SIGNIFICANT CHANGE UP (ref 0–0)
PLAT MORPH BLD: NORMAL — SIGNIFICANT CHANGE UP
PLATELET # BLD AUTO: 72 K/UL — LOW (ref 130–400)
POTASSIUM SERPL-MCNC: 2.3 MMOL/L — CRITICAL LOW (ref 3.5–5)
POTASSIUM SERPL-MCNC: 2.6 MMOL/L — CRITICAL LOW (ref 3.5–5)
POTASSIUM SERPL-SCNC: 2.3 MMOL/L — CRITICAL LOW (ref 3.5–5)
POTASSIUM SERPL-SCNC: 2.6 MMOL/L — CRITICAL LOW (ref 3.5–5)
RBC # BLD: 2.52 M/UL — LOW (ref 4.2–5.4)
RBC # FLD: 19.9 % — HIGH (ref 11.5–14.5)
RBC BLD AUTO: ABNORMAL
SODIUM SERPL-SCNC: 138 MMOL/L — SIGNIFICANT CHANGE UP (ref 135–146)
SPECIMEN SOURCE: SIGNIFICANT CHANGE UP
SPECIMEN SOURCE: SIGNIFICANT CHANGE UP
WBC # BLD: 2 K/UL — LOW (ref 4.8–10.8)
WBC # FLD AUTO: 2 K/UL — LOW (ref 4.8–10.8)

## 2022-11-16 PROCEDURE — 99232 SBSQ HOSP IP/OBS MODERATE 35: CPT

## 2022-11-16 RX ORDER — POTASSIUM CHLORIDE 20 MEQ
20 PACKET (EA) ORAL
Refills: 0 | Status: COMPLETED | OUTPATIENT
Start: 2022-11-16 | End: 2022-11-17

## 2022-11-16 RX ORDER — POTASSIUM CHLORIDE 20 MEQ
20 PACKET (EA) ORAL
Refills: 0 | Status: COMPLETED | OUTPATIENT
Start: 2022-11-16 | End: 2022-11-16

## 2022-11-16 RX ORDER — FILGRASTIM 480MCG/1.6
480 VIAL (ML) INJECTION DAILY
Refills: 0 | Status: DISCONTINUED | OUTPATIENT
Start: 2022-11-16 | End: 2022-11-17

## 2022-11-16 RX ORDER — MAGNESIUM SULFATE 500 MG/ML
2 VIAL (ML) INJECTION ONCE
Refills: 0 | Status: COMPLETED | OUTPATIENT
Start: 2022-11-16 | End: 2022-11-16

## 2022-11-16 RX ADMIN — ENOXAPARIN SODIUM 40 MILLIGRAM(S): 100 INJECTION SUBCUTANEOUS at 22:24

## 2022-11-16 RX ADMIN — Medication 650 MILLIGRAM(S): at 05:12

## 2022-11-16 RX ADMIN — Medication 300 MILLIGRAM(S): at 12:45

## 2022-11-16 RX ADMIN — Medication 50 MILLIEQUIVALENT(S): at 12:44

## 2022-11-16 RX ADMIN — Medication 3 MILLILITER(S): at 21:21

## 2022-11-16 RX ADMIN — CEFEPIME 100 MILLIGRAM(S): 1 INJECTION, POWDER, FOR SOLUTION INTRAMUSCULAR; INTRAVENOUS at 04:21

## 2022-11-16 RX ADMIN — Medication 480 MICROGRAM(S): at 22:24

## 2022-11-16 RX ADMIN — VORICONAZOLE 125 MILLIGRAM(S): 10 INJECTION, POWDER, LYOPHILIZED, FOR SOLUTION INTRAVENOUS at 05:37

## 2022-11-16 RX ADMIN — CEFEPIME 100 MILLIGRAM(S): 1 INJECTION, POWDER, FOR SOLUTION INTRAMUSCULAR; INTRAVENOUS at 23:24

## 2022-11-16 RX ADMIN — Medication 50 MILLIEQUIVALENT(S): at 15:13

## 2022-11-16 RX ADMIN — Medication 400 MILLIGRAM(S): at 12:45

## 2022-11-16 RX ADMIN — Medication 25 GRAM(S): at 18:40

## 2022-11-16 RX ADMIN — SODIUM CHLORIDE 75 MILLILITER(S): 9 INJECTION, SOLUTION INTRAVENOUS at 15:13

## 2022-11-16 RX ADMIN — Medication 650 MILLIGRAM(S): at 04:21

## 2022-11-16 RX ADMIN — Medication 50 MILLIEQUIVALENT(S): at 22:23

## 2022-11-16 RX ADMIN — Medication 3 MILLILITER(S): at 15:23

## 2022-11-16 RX ADMIN — VORICONAZOLE 125 MILLIGRAM(S): 10 INJECTION, POWDER, LYOPHILIZED, FOR SOLUTION INTRAVENOUS at 18:40

## 2022-11-16 RX ADMIN — CEFEPIME 100 MILLIGRAM(S): 1 INJECTION, POWDER, FOR SOLUTION INTRAMUSCULAR; INTRAVENOUS at 09:45

## 2022-11-16 RX ADMIN — Medication 3 MILLILITER(S): at 07:40

## 2022-11-16 RX ADMIN — CEFEPIME 100 MILLIGRAM(S): 1 INJECTION, POWDER, FOR SOLUTION INTRAMUSCULAR; INTRAVENOUS at 15:13

## 2022-11-16 RX ADMIN — Medication 20 MILLIEQUIVALENT(S): at 12:46

## 2022-11-16 NOTE — PROGRESS NOTE ADULT - SUBJECTIVE AND OBJECTIVE BOX
INTERVAL HPI/OVERNIGHT EVENTS:  Patient S&E at bedside. Patient still spiking fevers. She has dry cough. Chest X-ray showed progressive infiltrate. Denies any other complaints.     VITAL SIGNS:  T(F): 97.8 (11-16-22 @ 09:33)  HR: 92 (11-16-22 @ 09:33)  BP: 115/55 (11-16-22 @ 09:33)  RR: 18 (11-16-22 @ 09:33)  SpO2: 93% (11-16-22 @ 06:22)  Wt(kg): --    PHYSICAL EXAM:    Constitutional: NAD  Eyes: EOMI, sclera non-icteric  Neck: supple, no masses, no JVD  Respiratory: CTA b/l, good air entry b/l  Cardiovascular: RRR, no M/R/G  Gastrointestinal: soft, NTND, no masses palpable, + BS, no hepatosplenomegaly  Extremities: no c/c/e  Neurological: AAOx3      MEDICATIONS  (STANDING):  acetaminophen     Tablet .. 650 milliGRAM(s) Oral once  acyclovir   Oral Tab/Cap 400 milliGRAM(s) Oral daily  albuterol/ipratropium for Nebulization 3 milliLiter(s) Nebulizer every 6 hours  allopurinol 300 milliGRAM(s) Oral daily  cefepime   IVPB 2000 milliGRAM(s) IV Intermittent every 8 hours  enoxaparin Injectable 40 milliGRAM(s) SubCutaneous every 24 hours  potassium chloride    Tablet ER 20 milliEquivalent(s) Oral daily  potassium chloride  20 mEq/100 mL IVPB 20 milliEquivalent(s) IV Intermittent every 2 hours  sodium chloride 0.45%. 1000 milliLiter(s) (75 mL/Hr) IV Continuous <Continuous>  voriconazole IVPB 388 milliGRAM(s) IV Intermittent two times a day    MEDICATIONS  (PRN):  acetaminophen     Tablet .. 650 milliGRAM(s) Oral every 6 hours PRN Temp greater or equal to 38C (100.4F), Mild Pain (1 - 3)  aluminum hydroxide/magnesium hydroxide/simethicone Suspension 30 milliLiter(s) Oral every 4 hours PRN Dyspepsia  ondansetron Injectable 4 milliGRAM(s) IV Push every 8 hours PRN Nausea and/or Vomiting      Allergies    No Known Allergies    Intolerances        LABS:                        7.9    2.00  )-----------( 72       ( 16 Nov 2022 07:27 )             23.4     11-16    138  |  99  |  12  ----------------------------<  117<H>  2.3<LL>   |  29  |  0.7    Ca    7.9<L>      16 Nov 2022 07:27  Mg     1.5     11-16            RADIOLOGY & ADDITIONAL TESTS:  Studies reviewed.

## 2022-11-16 NOTE — PROGRESS NOTE ADULT - SUBJECTIVE AND OBJECTIVE BOX
MARAL [id number]    Subjective/Interval History   dry cough today,feels better , eating better   with weakness.       ROS  no chest pain     PHYSICAL EXAM  ICU Vital Signs Last 24 Hrs  T(C): 37.7 (16 Nov 2022 21:18), Max: 38.4 (16 Nov 2022 04:16)  T(F): 99.8 (16 Nov 2022 21:18), Max: 101.2 (16 Nov 2022 04:16)  HR: 100 (16 Nov 2022 21:18) (92 - 103)  BP: 123/59 (16 Nov 2022 21:18) (115/55 - 130/59)  BP(mean): --  ABP: --  ABP(mean): --  RR: 18 (16 Nov 2022 21:18) (18 - 20)  SpO2: 92% (16 Nov 2022 22:35) (92% - 93%)    O2 Parameters below as of 16 Nov 2022 22:35  Patient On (Oxygen Delivery Method): room air            GA : AAOX3, NAD   HEENT: PERRLA, EOMI  NECK: no JVD, no thyromegaly   CVS: S1 S2 no murmur no rubs no gallop  RESP:   no wheeze, no rhonchi, LLL  rales  ABD: Soft, NT, ND, tympanic, no rebound or guarding   : No Waldron, No CVA tenderness   EXT; +1 b/l pedal edema, no cyanosis  MSK: No ML spinal tenderness, normal ROM   NEURO: AAOX3, no new focal deficits     MEDICATIONS  (STANDING):  acetaminophen     Tablet .. 650 milliGRAM(s) Oral once  acyclovir   Oral Tab/Cap 400 milliGRAM(s) Oral daily  albuterol/ipratropium for Nebulization 3 milliLiter(s) Nebulizer every 6 hours  allopurinol 300 milliGRAM(s) Oral daily  cefepime   IVPB 2000 milliGRAM(s) IV Intermittent every 8 hours  enoxaparin Injectable 40 milliGRAM(s) SubCutaneous every 24 hours  filgrastim-sndz (ZARXIO) Injectable 480 MICROGram(s) SubCutaneous daily  potassium chloride    Tablet ER 20 milliEquivalent(s) Oral daily  potassium chloride  20 mEq/100 mL IVPB 20 milliEquivalent(s) IV Intermittent every 2 hours  sodium chloride 0.45%. 1000 milliLiter(s) (75 mL/Hr) IV Continuous <Continuous>  voriconazole IVPB 388 milliGRAM(s) IV Intermittent two times a day    MEDICATIONS  (PRN):  acetaminophen     Tablet .. 650 milliGRAM(s) Oral every 6 hours PRN Temp greater or equal to 38C (100.4F), Mild Pain (1 - 3)  aluminum hydroxide/magnesium hydroxide/simethicone Suspension 30 milliLiter(s) Oral every 4 hours PRN Dyspepsia  ondansetron Injectable 4 milliGRAM(s) IV Push every 8 hours PRN Nausea and/or Vomiting      LABS/ IMAGING                        7.9    2.00  )-----------( 72       ( 16 Nov 2022 07:27 )             23.4                           7.7    0.71  )-----------( 61       ( 14 Nov 2022 08:14 )             22.1     11-16    x   |  x   |  x   ----------------------------<  x   2.6<LL>   |  x   |  x     Ca    7.9<L>      16 Nov 2022 07:27  Mg     1.5     11-16 11-14    138  |  101  |  13  ----------------------------<  141<H>  3.2<L>   |  26  |  0.7    Ca    8.4      14 Nov 2022 08:14      CAPILLARY BLOOD GLUCOSE

## 2022-11-16 NOTE — PROGRESS NOTE ADULT - ASSESSMENT
Progressive pneumonia in a neutropenic patient.  AML, on maintenance chemotherapy.  Fatigue.  Pancytopenia from chemotherapy.  Awaiting Pulmonary Consult for bronchoscopy and cultures.    Please give Neupogen or any other bio similar available in the hospital, 480 mcg, SC today for a total of 5 days.  Monitor labs.  Anti-biotics as per ID

## 2022-11-16 NOTE — PROGRESS NOTE ADULT - ASSESSMENT
Sepsis due to Left lower lobe consolidation, c/f pneumonia possibly fungal, not c/w severe sepsis with severe neutropenic fever   - blood Cx & Urine Cx NGTD   - DC Vanco / cont Cefepime D5,   - cont  Voriconazole IV for possible fungal PNA - ID request possible bronch for BAL - pulm consulted  - initial LA wnl Monitor Lactate,  - Legionella, Strep Ag pending    - MRSA swab,  RVP negative,  neutropenic precautions   - Oxygen prn n/c   - ID consult, Hematology consult, - start neupogen 5 days today  - pct 20.10   - d/w ID case mgmt consult for prior auth fo posaconazole,  patient was not approved for oral antifungal   - ANC gradually improving   - pending serum galactomannan      AML (acute lymphocytic leukemia) s/p 2 units   ·  Plan: AML diagnosed approx 1 yr ago   - cont px with acyclovir and allopurinol   - Hem/Onc following   - Follow HG  - if Hg <7.0 consider transfusion  - T&S  - Monitor Vitals  - hold chemo for now    Pancytopenia  - per hem/onc transfuse 2 units or leukoreduced and irradiated prbcs  - will monitor start neuopogen - discussed with dr sanchez     Weakness  - cont iv fluids, PT recommends DAVID, per hematology oncology they recommend home health PT, will need to d/w son Dustin     DVT px  -Lovenox     Full code.

## 2022-11-17 LAB
ALBUMIN SERPL ELPH-MCNC: 2.4 G/DL — LOW (ref 3.5–5.2)
ALP SERPL-CCNC: 90 U/L — SIGNIFICANT CHANGE UP (ref 30–115)
ALT FLD-CCNC: 9 U/L — SIGNIFICANT CHANGE UP (ref 0–41)
ANION GAP SERPL CALC-SCNC: 11 MMOL/L — SIGNIFICANT CHANGE UP (ref 7–14)
AST SERPL-CCNC: 15 U/L — SIGNIFICANT CHANGE UP (ref 0–41)
BILIRUB SERPL-MCNC: 0.7 MG/DL — SIGNIFICANT CHANGE UP (ref 0.2–1.2)
BUN SERPL-MCNC: 10 MG/DL — SIGNIFICANT CHANGE UP (ref 10–20)
CALCIUM SERPL-MCNC: 7.8 MG/DL — LOW (ref 8.4–10.5)
CHLORIDE SERPL-SCNC: 102 MMOL/L — SIGNIFICANT CHANGE UP (ref 98–110)
CO2 SERPL-SCNC: 29 MMOL/L — SIGNIFICANT CHANGE UP (ref 17–32)
CREAT SERPL-MCNC: 0.6 MG/DL — LOW (ref 0.7–1.5)
EGFR: 94 ML/MIN/1.73M2 — SIGNIFICANT CHANGE UP
GLUCOSE SERPL-MCNC: 103 MG/DL — HIGH (ref 70–99)
HCT VFR BLD CALC: 23.6 % — LOW (ref 37–47)
HGB BLD-MCNC: 7.9 G/DL — LOW (ref 12–16)
MAGNESIUM SERPL-MCNC: 1.4 MG/DL — LOW (ref 1.8–2.4)
MCHC RBC-ENTMCNC: 31.7 PG — HIGH (ref 27–31)
MCHC RBC-ENTMCNC: 33.5 G/DL — SIGNIFICANT CHANGE UP (ref 32–37)
MCV RBC AUTO: 94.8 FL — SIGNIFICANT CHANGE UP (ref 81–99)
NRBC # BLD: 0 /100 WBCS — SIGNIFICANT CHANGE UP (ref 0–0)
PLATELET # BLD AUTO: 83 K/UL — LOW (ref 130–400)
POTASSIUM SERPL-MCNC: 2.9 MMOL/L — LOW (ref 3.5–5)
POTASSIUM SERPL-SCNC: 2.9 MMOL/L — LOW (ref 3.5–5)
PROT SERPL-MCNC: 4.8 G/DL — LOW (ref 6–8)
RBC # BLD: 2.49 M/UL — LOW (ref 4.2–5.4)
RBC # FLD: 20.2 % — HIGH (ref 11.5–14.5)
SODIUM SERPL-SCNC: 142 MMOL/L — SIGNIFICANT CHANGE UP (ref 135–146)
WBC # BLD: 4.34 K/UL — LOW (ref 4.8–10.8)
WBC # FLD AUTO: 4.34 K/UL — LOW (ref 4.8–10.8)

## 2022-11-17 PROCEDURE — 99223 1ST HOSP IP/OBS HIGH 75: CPT

## 2022-11-17 PROCEDURE — 99233 SBSQ HOSP IP/OBS HIGH 50: CPT

## 2022-11-17 PROCEDURE — 71045 X-RAY EXAM CHEST 1 VIEW: CPT | Mod: 26

## 2022-11-17 RX ORDER — MAGNESIUM SULFATE 500 MG/ML
2 VIAL (ML) INJECTION
Refills: 0 | Status: COMPLETED | OUTPATIENT
Start: 2022-11-17 | End: 2022-11-17

## 2022-11-17 RX ORDER — POTASSIUM CHLORIDE 20 MEQ
20 PACKET (EA) ORAL
Refills: 0 | Status: COMPLETED | OUTPATIENT
Start: 2022-11-17 | End: 2022-11-17

## 2022-11-17 RX ADMIN — CEFEPIME 100 MILLIGRAM(S): 1 INJECTION, POWDER, FOR SOLUTION INTRAMUSCULAR; INTRAVENOUS at 23:14

## 2022-11-17 RX ADMIN — Medication 300 MILLIGRAM(S): at 12:48

## 2022-11-17 RX ADMIN — Medication 3 MILLILITER(S): at 19:28

## 2022-11-17 RX ADMIN — Medication 25 GRAM(S): at 12:48

## 2022-11-17 RX ADMIN — Medication 50 MILLIEQUIVALENT(S): at 05:47

## 2022-11-17 RX ADMIN — CEFEPIME 100 MILLIGRAM(S): 1 INJECTION, POWDER, FOR SOLUTION INTRAMUSCULAR; INTRAVENOUS at 16:51

## 2022-11-17 RX ADMIN — Medication 25 GRAM(S): at 15:08

## 2022-11-17 RX ADMIN — Medication 50 MILLIEQUIVALENT(S): at 17:22

## 2022-11-17 RX ADMIN — ENOXAPARIN SODIUM 40 MILLIGRAM(S): 100 INJECTION SUBCUTANEOUS at 22:43

## 2022-11-17 RX ADMIN — Medication 50 MILLIEQUIVALENT(S): at 03:24

## 2022-11-17 RX ADMIN — VORICONAZOLE 125 MILLIGRAM(S): 10 INJECTION, POWDER, LYOPHILIZED, FOR SOLUTION INTRAVENOUS at 17:22

## 2022-11-17 RX ADMIN — Medication 50 MILLIEQUIVALENT(S): at 01:19

## 2022-11-17 RX ADMIN — VORICONAZOLE 125 MILLIGRAM(S): 10 INJECTION, POWDER, LYOPHILIZED, FOR SOLUTION INTRAVENOUS at 05:47

## 2022-11-17 RX ADMIN — SODIUM CHLORIDE 75 MILLILITER(S): 9 INJECTION, SOLUTION INTRAVENOUS at 11:05

## 2022-11-17 RX ADMIN — Medication 20 MILLIEQUIVALENT(S): at 12:49

## 2022-11-17 RX ADMIN — Medication 50 MILLIEQUIVALENT(S): at 15:08

## 2022-11-17 RX ADMIN — CEFEPIME 100 MILLIGRAM(S): 1 INJECTION, POWDER, FOR SOLUTION INTRAMUSCULAR; INTRAVENOUS at 11:05

## 2022-11-17 RX ADMIN — Medication 400 MILLIGRAM(S): at 12:48

## 2022-11-17 RX ADMIN — Medication 50 MILLIEQUIVALENT(S): at 12:48

## 2022-11-17 RX ADMIN — Medication 3 MILLILITER(S): at 10:21

## 2022-11-17 RX ADMIN — Medication 3 MILLILITER(S): at 14:21

## 2022-11-17 NOTE — CONSULT NOTE ADULT - ASSESSMENT
ASSESSMENT  78 y/o female with PMH of AML on chemo (follows with oncologist Dr. Marsh) last treatment was 2 weeks ago.  Pt was brought to hospital for weakness, dizziness, vomiting and fever TMax 104 at home. As per daughter, pt has been fatigued the last 3 days associated with malaise and vomiting (dry heaves) that started today.     IMPRESSION  #Neutropenic fever with Severe sepsis on admission T>101 P>90 WBC < 4 lactic acidosis    Given CT chest finding of focal opacity and pt is neutropenic and not on antifungal ppx cannot rule out IFI         Port- clean    No real localizing symptoms     UA without significant pyuria     Rapid RVP Result: NotDetec (11-12-22 @ 06:06)    MRSA PCR Result.: Negative (11-12-22 @ 06:04)    CT Chest  The central tracheobronchial tree is patent.   There is a focal opacity in the left infrahilar region, medially and   posteriorly in the left lower lobe. Margins are ill-defined. This may   represent focal area of pneumonia, although tumor cannot be excluded. In   the absence of previous scans for comparison, a PET/CT or a short-term   follow-up CT scan in 3 months is recommended.  Mild reticular opacities   are noted at the lung bases. There is no pleural effusion. There is no   pneumothorax.  #CT Mild hepatosplenomegaly.  #AML on chemo   #Morbid Obesity BMI (kg/m2): 36.7  Creatinine, Serum: 0.9 (11-12-22 @ 08:08)    Weight (kg): 97.069 (11-11-22 @ 21:39)    RECOMMENDATIONS  - f/u BCX (D/C VANC if NG)  - Cefepime 2g q8h IV  - fungitell, serum aspergillus galactomannan   - Empiric Voriconazole 6 mg/kg twice daily IV for 2 doses, then 4 mg/kg twice daily IV  - Hepatosplenomegaly and pancytopenia - send CMV IgM/IgG, CMV serum PCR, EBV IgM/IgG, babesia PCR  - ACV ppx     If any questions, please call or send a message on [employer]  Please continue to update ID with any pertinent new laboratory or radiographic findings  #[id number]  
Impression:  LL opacity   neutropenia         Plan:  no fever in the last 24 hrs   continue abx   check fungitell \  bld cx   repeat cxr today to evaluate if worsening might need to repeat ct scan   in setting of no fever in last 24 hrs no need for bronch   repeat cxr   follow ID 
Pancytopenia and neutropenic fever from maintenance chemotherapy for AML.    Prophylactic anti-biotics after blood cultures and urine cultures as per ID already done, continue the present treatment.  Discussed also with , Infectious Diseases specialist.  If any further communication with me is needed, please call my office at [phone] or my Mobile # [phone]

## 2022-11-17 NOTE — PROGRESS NOTE ADULT - SUBJECTIVE AND OBJECTIVE BOX
MARAL [id number]    Subjective/Interval History   dry cough today,feels better , eating better   with weakness.       ROS  no chest pain     PHYSICAL EXAM  ICU Vital Signs Last 24 Hrs  T(C): 36.4 (17 Nov 2022 05:09), Max: 37.7 (16 Nov 2022 21:18)  T(F): 97.5 (17 Nov 2022 05:09), Max: 99.8 (16 Nov 2022 21:18)  HR: 94 (17 Nov 2022 05:09) (94 - 103)  BP: 125/61 (17 Nov 2022 05:09) (123/59 - 130/59)  BP(mean): --  ABP: --  ABP(mean): --  RR: 20 (17 Nov 2022 05:09) (18 - 20)  SpO2: 92% (17 Nov 2022 05:50) (92% - 92%)    O2 Parameters below as of 17 Nov 2022 05:50  Patient On (Oxygen Delivery Method): room air                  GA : AAOX3, NAD   HEENT: PERRLA, EOMI  NECK: no JVD, no thyromegaly   CVS: S1 S2 no murmur no rubs no gallop  RESP:   no wheeze, no rhonchi, LLL  rales  ABD: Soft, NT, ND, tympanic, no rebound or guarding   : No Waldron, No CVA tenderness   EXT; +1 b/l pedal edema, no cyanosis  MSK: No ML spinal tenderness, normal ROM   NEURO: AAOX3, no new focal deficits     MEDICATIONS  (STANDING):  acetaminophen     Tablet .. 650 milliGRAM(s) Oral once  acyclovir   Oral Tab/Cap 400 milliGRAM(s) Oral daily  albuterol/ipratropium for Nebulization 3 milliLiter(s) Nebulizer every 6 hours  allopurinol 300 milliGRAM(s) Oral daily  cefepime   IVPB 2000 milliGRAM(s) IV Intermittent every 8 hours  enoxaparin Injectable 40 milliGRAM(s) SubCutaneous every 24 hours  filgrastim-sndz (ZARXIO) Injectable 480 MICROGram(s) SubCutaneous daily  potassium chloride    Tablet ER 20 milliEquivalent(s) Oral daily  potassium chloride  20 mEq/100 mL IVPB 20 milliEquivalent(s) IV Intermittent every 2 hours  sodium chloride 0.45%. 1000 milliLiter(s) (75 mL/Hr) IV Continuous <Continuous>  voriconazole IVPB 388 milliGRAM(s) IV Intermittent two times a day    MEDICATIONS  (PRN):  acetaminophen     Tablet .. 650 milliGRAM(s) Oral every 6 hours PRN Temp greater or equal to 38C (100.4F), Mild Pain (1 - 3)  aluminum hydroxide/magnesium hydroxide/simethicone Suspension 30 milliLiter(s) Oral every 4 hours PRN Dyspepsia  ondansetron Injectable 4 milliGRAM(s) IV Push every 8 hours PRN Nausea and/or Vomiting      LABS/ IMAGING                        7.9    4.34  )-----------( 83       ( 17 Nov 2022 06:51 )             23.6                           7.9    2.00  )-----------( 72       ( 16 Nov 2022 07:27 )             23.4                           7.7    0.71  )-----------( 61       ( 14 Nov 2022 08:14 )             22.1     11-17    142  |  102  |  10  ----------------------------<  103<H>  2.9<L>   |  29  |  0.6<L>    Ca    7.8<L>      17 Nov 2022 06:51  Mg     1.4     11-17    TPro  4.8<L>  /  Alb  2.4<L>  /  TBili  0.7  /  DBili  x   /  AST  15  /  ALT  9   /  AlkPhos  90  11-17 11-16    x   |  x   |  x   ----------------------------<  x   2.6<LL>   |  x   |  x     Ca    7.9<L>      16 Nov 2022 07:27  Mg     1.5     11-16 11-14    138  |  101  |  13  ----------------------------<  141<H>  3.2<L>   |  26  |  0.7    Ca    8.4      14 Nov 2022 08:14      CAPILLARY BLOOD GLUCOSE

## 2022-11-17 NOTE — CONSULT NOTE ADULT - SUBJECTIVE AND OBJECTIVE BOX
Patient is a 77y old  Female who presents with a chief complaint of Fever (16 Nov 2022 23:18)      HPI:  Pt is a 76 y/o female with PMH of AML on chemo (follows with oncologist Dr. Marsh) last treatment was 2 weeks ago.  Pt was brought to hospital for weakness, dizziness, vomiting and fever TMax 104 at home. As per daughter, pt has been fatigued the last 3 days associated with malaise and vomiting (dry heaves) that started today. No cough, congestion, abdominal pain, diarrhea or dysuria. with wheeze.   Patient laying in bed and resting, HOWEVER patient appears to have some difficulty with full sentences and appears SOB.  Pt denies dyspnea, and bedside pulse Ox 94% on RA and on 3 L %.  Pt denies chest pain, focal weakness, h/a, or recent infectious exposure.    Reports diagnosis of leukemia after first covid booster last december and recently received another covid booster last week.  (11 Nov 2022 18:41)      PAST MEDICAL & SURGICAL HISTORY:  AML (acute myeloid leukemia)  on chemotx      Brain aneurysm  S/P coiling      S/P cerebral aneurysm repair  coiling          FAMILY HISTORY:  FH: heart disease (Mother)      Family history: No family cardiovascular system   Occupation:  Alochol: Denied  Smoking: Denied  Drug Use: Denied  Marital Status:           Allergies    No Known Allergies    Intolerances        Home Medications:  acetaminophen 325 mg oral tablet: 2 tab(s) orally every 6 hours, As needed, Temp greater or equal to 38C (100.4F), Mild Pain (1 - 3) (14 Nov 2022 11:41)  acetaminophen 325 mg oral tablet: 2 tab(s) orally once (14 Nov 2022 11:41)  acyclovir 400 mg oral tablet: 1 tab(s) orally once a day (11 Nov 2022 20:53)  allopurinol 300 mg oral tablet: 1 tab(s) orally once a day (11 Nov 2022 20:53)  potassium chloride 20 mEq oral tablet, extended release: 1 tab(s) orally once a day (11 Nov 2022 20:53)      ROS: as in HPI; All other systems reviewed are negative        PHYSICAL EXAM:  Vital Signs Last 24 Hrs  T(C): 36.4 (17 Nov 2022 05:09), Max: 37.7 (16 Nov 2022 21:18)  T(F): 97.5 (17 Nov 2022 05:09), Max: 99.8 (16 Nov 2022 21:18)  HR: 94 (17 Nov 2022 05:09) (92 - 103)  BP: 125/61 (17 Nov 2022 05:09) (115/55 - 130/59)  BP(mean): --  RR: 20 (17 Nov 2022 05:09) (18 - 20)  SpO2: 92% (17 Nov 2022 05:50) (92% - 92%)    Parameters below as of 17 Nov 2022 05:50  Patient On (Oxygen Delivery Method): room air          GENERAL: awake   HEAD:  Atraumatic, Normocephalic  EYES: EOMI, PERRLA, conjunctiva and sclera clear  ENMT: No tonsillar erythema, exudates, or enlargement; Moist mucous membranes, Good dentition, No lesions  NECK: Supple, No JVD, Normal thyroid  NERVOUS SYSTEM:  Alert & Oriented X3, Good concentration; Motor Strength 5/5 B/L upper and lower extremities; DTRs 2+ intact and symmetric  CHEST/LUNG: Clear to percussion bilaterally; No rales, rhonchi, wheezing, or rubs  HEART: Regular rate and rhythm; No murmurs, rubs, or gallops  ABDOMEN: Soft, Nontender, Nondistended; Bowel sounds present  EXTREMITIES:  2+ Peripheral Pulses, No clubbing, cyanosis, or edema  LYMPH: No lymphadenopathy noted  SKIN: No rashes or lesions    HOSPITAL MEDICATIONS:  MEDICATIONS  (STANDING):  acetaminophen     Tablet .. 650 milliGRAM(s) Oral once  acyclovir   Oral Tab/Cap 400 milliGRAM(s) Oral daily  albuterol/ipratropium for Nebulization 3 milliLiter(s) Nebulizer every 6 hours  allopurinol 300 milliGRAM(s) Oral daily  cefepime   IVPB 2000 milliGRAM(s) IV Intermittent every 8 hours  enoxaparin Injectable 40 milliGRAM(s) SubCutaneous every 24 hours  filgrastim-sndz (ZARXIO) Injectable 480 MICROGram(s) SubCutaneous daily  potassium chloride    Tablet ER 20 milliEquivalent(s) Oral daily  sodium chloride 0.45%. 1000 milliLiter(s) (75 mL/Hr) IV Continuous <Continuous>  voriconazole IVPB 388 milliGRAM(s) IV Intermittent two times a day    MEDICATIONS  (PRN):  acetaminophen     Tablet .. 650 milliGRAM(s) Oral every 6 hours PRN Temp greater or equal to 38C (100.4F), Mild Pain (1 - 3)  aluminum hydroxide/magnesium hydroxide/simethicone Suspension 30 milliLiter(s) Oral every 4 hours PRN Dyspepsia  ondansetron Injectable 4 milliGRAM(s) IV Push every 8 hours PRN Nausea and/or Vomiting      LABS:                        7.9    4.34  )-----------( 83       ( 17 Nov 2022 06:51 )             23.6     11-16    x   |  x   |  x   ----------------------------<  x   2.6<LL>   |  x   |  x     Ca    7.9<L>      16 Nov 2022 07:27  Mg     1.5     11-16                    RADIOLOGY: cxr 11/14 LLL opacity   [ ] Reviewed and interpreted by me    ECHO:    Point of Care Ultrasound Findings;    PFT:      
Patient is a 77y old  Female who presents with a chief complaint of Fever (11 Nov 2022 18:41)      HPI:  Pt is a 76 y/o female with PMH of AML on chemo (follows with oncologist Dr. Marsh) last treatment was 2 weeks ago.  Pt was brought to hospital for weakness, dizziness, vomiting and fever TMax 104 at home. As per daughter, pt has been fatigued the last 3 days associated with malaise and vomiting (dry heaves) that started today. No cough, congestion, abdominal pain, diarrhea or dysuria. with wheeze.   Patient laying in bed and resting, HOWEVER patient appears to have some difficulty with full sentences and appears SOB.  Pt denies dyspnea, and bedside pulse Ox 94% on RA and on 3 L %.  Pt denies chest pain, focal weakness, h/a, or recent infectious exposure.    Reports diagnosis of leukemia after first covid booster last december and recently received another covid booster last week.  (11 Nov 2022 18:41)  I saw patient today, November 12, 2022 at 8:15 AM.  Patient comfortable.  No specific complaints.  She had high grade fever with chills yesterday lasted a day.   I saw her in my office on Thursday, Nov 10, 2022. She was fine on that day. She was in my office for hydration. She has chemo induced pancytopenia. She has AML, in remission. She is on maintenance chemotherapy with Dacogen and Venetoclax    ROS:  Negative except for:    PAST MEDICAL & SURGICAL HISTORY:  AML (acute myeloid leukemia)  on chemotx      Brain aneurysm  S/P coiling      S/P cerebral aneurysm repair  coiling          SOCIAL HISTORY:    FAMILY HISTORY:  FH: heart disease (Mother)        MEDICATIONS  (STANDING):  acyclovir   Oral Tab/Cap 400 milliGRAM(s) Oral daily  albuterol/ipratropium for Nebulization 3 milliLiter(s) Nebulizer every 6 hours  allopurinol 300 milliGRAM(s) Oral daily  cefepime   IVPB 2000 milliGRAM(s) IV Intermittent every 8 hours  enoxaparin Injectable 40 milliGRAM(s) SubCutaneous every 24 hours  lactated ringers. 1000 milliLiter(s) (100 mL/Hr) IV Continuous <Continuous>  vancomycin  IVPB 1000 milliGRAM(s) IV Intermittent every 12 hours  vancomycin  IVPB      voriconazole IVPB 250 milliGRAM(s) IV Intermittent every 12 hours    MEDICATIONS  (PRN):  acetaminophen     Tablet .. 650 milliGRAM(s) Oral every 6 hours PRN Temp greater or equal to 38C (100.4F), Mild Pain (1 - 3)  aluminum hydroxide/magnesium hydroxide/simethicone Suspension 30 milliLiter(s) Oral every 4 hours PRN Dyspepsia  ondansetron Injectable 4 milliGRAM(s) IV Push every 8 hours PRN Nausea and/or Vomiting      Allergies    No Known Allergies    Intolerances        Vital Signs Last 24 Hrs  T(C): 39.2 (12 Nov 2022 05:29), Max: 39.6 (11 Nov 2022 15:14)  T(F): 102.5 (12 Nov 2022 05:29), Max: 103.3 (11 Nov 2022 15:14)  HR: 103 (12 Nov 2022 05:29) (94 - 121)  BP: 94/56 (12 Nov 2022 05:29) (94/56 - 184/78)  BP(mean): --  RR: 16 (12 Nov 2022 05:29) (16 - 28)  SpO2: 99% (11 Nov 2022 20:30) (95% - 99%)    Parameters below as of 11 Nov 2022 20:30  Patient On (Oxygen Delivery Method): nasal cannula  O2 Flow (L/min): 2      PHYSICAL EXAM  General: adult in NAD  HEENT: clear oropharynx, anicteric sclera, pink conjunctiva  Neck: supple  CV: normal S1/S2 with no murmur rubs or gallops  Lungs: positive air movement b/l ant lungs,clear to auscultation, no wheezes, no rales  Abdomen: soft non-tender non-distended, no hepatosplenomegaly  Ext: no clubbing cyanosis or edema  Skin: no rashes and no petechiae  Neuro: alert and oriented X 4, no focal deficits      LABS:                          6.2    0.26  )-----------( 45       ( 12 Nov 2022 08:08 )             18.9         Mean Cell Volume : 96.4 fL  Mean Cell Hemoglobin : 31.6 pg  Mean Cell Hemoglobin Concentration : 32.8 g/dL  Auto Neutrophil # : 0.11 K/uL  Auto Lymphocyte # : 0.12 K/uL  Auto Monocyte # : 0.03 K/uL  Auto Eosinophil # : 0.00 K/uL  Auto Basophil # : 0.00 K/uL  Auto Neutrophil % : 42.3 %  Auto Lymphocyte % : 46.2 %  Auto Monocyte % : 11.5 %  Auto Eosinophil % : 0.0 %  Auto Basophil % : 0.0 %      Serial CBC's  11-12 @ 08:08  Hct-18.9 / Hgb-6.2 / Plat-45 / RBC-1.96 / WBC-0.26  Serial CBC's  11-11 @ 15:09  Hct-23.9 / Hgb-8.0 / Plat-97 / RBC-2.49 / WBC-0.27      11-11    136  |  99  |  16  ----------------------------<  163<H>  3.5   |  25  |  0.8    Ca    8.9      11 Nov 2022 15:09    TPro  6.2  /  Alb  3.9  /  TBili  1.9<H>  /  DBili  x   /  AST  16  /  ALT  29  /  AlkPhos  102  11-11      PT/INR - ( 11 Nov 2022 15:09 )   PT: 15.60 sec;   INR: 1.36 ratio         PTT - ( 11 Nov 2022 15:09 )  PTT:33.2 sec                BLOOD SMEAR INTERPRETATION:       RADIOLOGY & ADDITIONAL STUDIES:    
MARAL COURTNEY  77y, Female  Allergy: No Known Allergies      CHIEF COMPLAINT:   Fever (2022 09:07)      LOS  1d    HPI  HPI:  Pt is a 76 y/o female with PMH of AML on chemo (follows with oncologist Dr. Marsh) last treatment was 2 weeks ago.  Pt was brought to hospital for weakness, dizziness, vomiting and fever TMax 104 at home. As per daughter, pt has been fatigued the last 3 days associated with malaise and vomiting (dry heaves) that started today. No cough, congestion, abdominal pain, diarrhea or dysuria. with wheeze.   Patient laying in bed and resting, HOWEVER patient appears to have some difficulty with full sentences and appears SOB.  Pt denies dyspnea, and bedside pulse Ox 94% on RA and on 3 L %.  Pt denies chest pain, focal weakness, h/a, or recent infectious exposure.    Reports diagnosis of leukemia after first covid booster last december and recently received another covid booster last week.  (2022 18:41)      INFECTIOUS DISEASE HISTORY:  ID consulted for neutropenic fever  Pt reports mild HA   She has neck pain that is chronic   She is often around her young grandchildren  She had been on posa ppx but insurance stopped covering  CT concerning for lung finding    Currently ordered for:  acyclovir   Oral Tab/Cap 400 milliGRAM(s) Oral daily  cefepime   IVPB 2000 milliGRAM(s) IV Intermittent every 8 hours  vancomycin  IVPB 1000 milliGRAM(s) IV Intermittent every 12 hours  vancomycin  IVPB      voriconazole IVPB 250 milliGRAM(s) IV Intermittent every 12 hours      PMH  PAST MEDICAL & SURGICAL HISTORY:  AML (acute myeloid leukemia)  on chemotx      Brain aneurysm  S/P coiling      S/P cerebral aneurysm repair  coiling          FAMILY HISTORY  No pertinent family history in first degree relatives    FH: heart disease (Mother)        SOCIAL HISTORY  Social History:  Living Condition: with   Ambulation Status:  independent  Tobacco use:  Denies  EtOH use:  Denies  Illicit drug use: Denies  Marital Status: Yes    Family history: Father: cancer (2022 18:41)        ROS  General: Denies rigors, nightsweats  HEENT: Denies headache, rhinorrhea, sore throat, eye pain  CV: Denies CP, palpitations  PULM: Denies wheezing, hemoptysis  GI: Denies hematemesis, hematochezia, melena  : Denies discharge, hematuria  MSK: Denies arthralgias, myalgias  SKIN: Denies rash, lesions  NEURO: Denies paresthesias, weakness  PSYCH: Denies depression, anxiety     VITALS:  T(F): 102.5, Max: 103.3 (22 @ 15:14)  HR: 103  BP: 94/56  RR: 16Vital Signs Last 24 Hrs  T(C): 39.2 (2022 05:29), Max: 39.6 (2022 15:14)  T(F): 102.5 (2022 05:29), Max: 103.3 (2022 15:14)  HR: 103 (2022 05:29) (94 - 121)  BP: 94/56 (2022 05:29) (94/56 - 184/78)  BP(mean): --  RR: 16 (2022 05:29) (16 - 28)  SpO2: 99% (2022 20:30) (95% - 99%)    Parameters below as of 2022 20:30  Patient On (Oxygen Delivery Method): nasal cannula  O2 Flow (L/min): 2      PHYSICAL EXAM:  Gen: NAD, resting in bed  HEENT: Normocephalic, atraumatic, no oral lesions, no sinus tenderness to palpation   No paraspinal tenderness to palpation   Neck: supple, no lymphadenopathy  CV: Regular rate & regular rhythm  Lungs: decreased BS at bases, no fremitus  Abdomen: Soft, BS present  Ext: Warm, well perfused  Neuro: non focal, awake  Skin: no rash, no erythema  Lines: no phlebitis port    TESTS & MEASUREMENTS:                        6.2    0.26  )-----------( 45       ( 2022 08:08 )             18.9     -12    139  |  101  |  16  ----------------------------<  131<H>  3.5   |  26  |  0.9    Ca    8.1<L>      2022 08:08    TPro  5.0<L>  /  Alb  2.8<L>  /  TBili  1.1  /  DBili  x   /  AST  11  /  ALT  19  /  AlkPhos  67  -12      LIVER FUNCTIONS - ( 2022 08:08 )  Alb: 2.8 g/dL / Pro: 5.0 g/dL / ALK PHOS: 67 U/L / ALT: 19 U/L / AST: 11 U/L / GGT: x           Urinalysis Basic - ( 2022 17:10 )    Color: Yellow / Appearance: Slightly Cloudy / S.015 / pH: x  Gluc: x / Ketone: Negative  / Bili: Negative / Urobili: 1.0 mg/dL   Blood: x / Protein: 100 mg/dL / Nitrite: Negative   Leuk Esterase: Negative / RBC: 3-5 /HPF / WBC 6-10 /HPF   Sq Epi: x / Non Sq Epi: Many /HPF / Bacteria: Many          Lactate, Blood: 2.7 mmol/L (22 @ 08:08)  Blood Gas Venous - Lactate: 1.40 mmol/L (22 @ 15:45)  Lactate, Blood: 1.4 mmol/L (22 @ 15:09)      INFECTIOUS DISEASES TESTING  Rapid RVP Result: NotDetec (22 @ 06:06)  MRSA PCR Result.: Negative (22 @ 06:04)      INFLAMMATORY MARKERS      RADIOLOGY & ADDITIONAL TESTS:  I have personally reviewed the last Chest xray  CXR  Xray Chest 1 View AP/PA:   ACC: [id number] EXAM:  XR CHEST 1 VIEW                          PROCEDURE DATE:  2022          INTERPRETATION:  Clinical History / Reason for exam: Sepsis    Comparison : Chest radiograph None.    Technique/Positioning: Frontal chest radiograph.    Findings:    Support devices: Right chest port tip overlying the SVC    Cardiac/mediastinum/hilum: Enlarged cardiac silhouette. Atherosclerotic   aorta.    Lung parenchyma/Pleura: Question of mild pulmonary vascular congestion.   No pneumothorax.    Skeleton/soft tissues: Osteopenia. Degenerative changes.    Impression:    Question of mild pulmonary vascular congestion        --- End of Report ---            DONALD VAUGHN MD; Attending Radiologist  This document has been electronically signed. 2022  4:33PM (22 @ 15:16)      CT  CT Abdomen and Pelvis w/ IV Cont:   ACC: [id number] EXAM:  CT ABDOMEN AND PELVIS IC                        ACC: [id number] EXAM:  CT CHEST IC                          PROCEDURE DATE:  2022          INTERPRETATION:  REASON FOR EXAM / CLINICAL STATEMENT:  Hypoxia, fever,   vomiting. WBC 0.27    PMHx of AML on chemo and vertigo      TECHNIQUE:  Contiguous axial CT images were obtained from the thoracic   inlet through the pubic symphysis with IV contrast.  Reformatted images   in the coronal and sagittal planes were acquired.      COMPARISON CT: None    OTHER STUDIES USED FOR CORRELATION: None.      FINDINGS / CHEST:    TUBES AND LINES: Right sided Port-A-Cath seen entering the right jugular   vein, with extension to the SVC.    HEART AND VESSELS: The heart is normal in size. There is no pericardial   effusion.    There is no evidence of central pulmonary embolism.    Normal caliber aorta and pulmonary artery. Aortic calcifications are   noted. There is no evidence of aortic aneurysm or dissection.    Brachiocephalic vessels are unremarkable.    MEDIASTINUM: There are no suspicious mediastinal, hilar or axillary lymph   nodes. The visualized portion of the thyroid gland is unremarkable.    AIRWAYS, LUNGS AND PLEURA: The central tracheobronchial tree is patent.   There is a focal opacity in the left infrahilar region, medially and   posteriorly in the left lower lobe. Margins are ill-defined. This may   represent focal area of pneumonia, although tumor cannot be excluded. In   the absence of previous scans for comparison, a PET/CT or a short-term   follow-up CT scan in 3 months is recommended.  Mild reticular opacities   are noted at the lung bases. There is no pleural effusion. There is no   pneumothorax.    BONES AND SOFT TISSUES: Degenerative changes of thespine.      FINDINGS / ABDOMEN AND PELVIS:      HEPATIC: The liver is enlarged measuring 22.1 cm. No evidence of solid   mass or bile duct dilatation. Subcentimeter hypodensities are noted, too   small for further characterization. Hepatic steatosis is noted. The   portal vein is patent. The hepatic veins are opacified.    BILIARY: No calcified gallstones are noted.    SPLEEN: Spleen is at the upper limits of normal in size measuring 13 cm.    PANCREAS: The pancreas is normal in size and configuration. No evidence   of mass or pancreatitis.    ADRENAL GLANDS: Unremarkable.    KIDNEYS: There is symmetric renal enhancement. No evidence of   hydronephrosis, calcified stones, or solid mass.    ABDOMINOPELVIC NODES: Unremarkable.    PELVIC ORGANS:No evidence of pelvic mass, lymphadenopathy, or fluid   collection.    BLADDER: Unremarkable.    PERITONEUM/MESENTERY/BOWEL: Sigmoid diverticula noted without evidence of   diverticulitis. No evidence of bowel obstruction, colitis, inflammatory   process, or ascites. No pneumoperitoneum.  The appendix is not identified   however no inflammatory changes are noted in the right lower quadrant.    BONES/SOFT TISSUES: Mild degenerative changes of the spine are noted.   Sclerosis is noted at the right and left sacroiliac joints. No evidence   of acute fracture.    OTHER: Aortoiliac calcifications are noted with no evidence of abdominal   aortic aneurysm. The hepatic artery arises directly from the abdominal   aorta.      IMPRESSION:    There is a focal opacity in the left infrahilar region, medially and   posteriorly in the left lower lobe. Margins are ill-defined. This may   represent focal area of pneumonia, although tumor cannot be excluded. In   the absence of previous scans for comparison,a PET/CT or a short-term   follow-up CT scan in 3 months is recommended.    Mild hepatosplenomegaly.    --- End of Report ---            MIRIAM HUYNH MD; Attending Interventional Radiologist  This document has been electronically signed. 2022  5:40PM (22 @ 16:32)      CARDIOLOGY TESTING  12 Lead ECG:   Ventricular Rate 121 BPM    Atrial Rate 170 BPM    QRS Duration 78 ms    Q-T Interval 434 ms    QTC Calculation(Bazett) 616 ms    R Axis 15 degrees    T Axis 51 degrees    Diagnosis Line Sinus tachycardia  Nonspecific ST and T wave abnormality  Abnormal ECG    Confirmed by RUSLAN KAPLAN, MIRIAM ([id number]) on 2022 3:06:08 PM (22 @ 14:30)      MEDICATIONS  acyclovir   Oral Tab/Cap 400 Oral daily  albuterol/ipratropium for Nebulization 3 Nebulizer every 6 hours  allopurinol 300 Oral daily  cefepime   IVPB 2000 IV Intermittent every 8 hours  enoxaparin Injectable 40 SubCutaneous every 24 hours  lactated ringers. 1000 IV Continuous <Continuous>  vancomycin  IVPB 1000 IV Intermittent every 12 hours  vancomycin  IVPB     voriconazole IVPB 250 IV Intermittent every 12 hours        ANTIBIOTICS:  acyclovir   Oral Tab/Cap 400 milliGRAM(s) Oral daily  cefepime   IVPB 2000 milliGRAM(s) IV Intermittent every 8 hours  vancomycin  IVPB 1000 milliGRAM(s) IV Intermittent every 12 hours  vancomycin  IVPB      voriconazole IVPB 250 milliGRAM(s) IV Intermittent every 12 hours      ALLERGIES:  No Known Allergies

## 2022-11-17 NOTE — PROGRESS NOTE ADULT - SUBJECTIVE AND OBJECTIVE BOX
SHERWIN, MARAL  77y, Female  Allergy: No Known Allergies      LOS  6d    CHIEF COMPLAINT: Fever (17 Nov 2022 11:11)      INTERVAL EVENTS/HPI  - No acute events overnight  - T(F): , Max: 99.8 (11-16-22 @ 21:18)  -- no further fevers - ANC is improved  - WBC Count: 4.34 (11-17-22 @ 06:51)  WBC Count: 2.00 (11-16-22 @ 07:27)     - Creatinine, Serum: 0.6 (11-17-22 @ 06:51)  Creatinine, Serum: 0.7 (11-16-22 @ 07:27)       ROS  General: Denies rigors, nightsweats  HEENT: Denies headache, rhinorrhea, sore throat, eye pain  CV: Denies CP, palpitations  PULM: Denies wheezing, hemoptysis  GI: Denies hematemesis, hematochezia, melena  : Denies discharge, hematuria  MSK: Denies arthralgias, myalgias  SKIN: Denies rash, lesions  NEURO: Denies paresthesias, weakness  PSYCH: Denies depression, anxiety    VITALS:  T(F): 97.5, Max: 99.8 (11-16-22 @ 21:18)  HR: 94  BP: 125/61  RR: 20Vital Signs Last 24 Hrs  T(C): 36.4 (17 Nov 2022 05:09), Max: 37.7 (16 Nov 2022 21:18)  T(F): 97.5 (17 Nov 2022 05:09), Max: 99.8 (16 Nov 2022 21:18)  HR: 94 (17 Nov 2022 05:09) (94 - 100)  BP: 125/61 (17 Nov 2022 05:09) (123/59 - 125/61)  BP(mean): --  RR: 20 (17 Nov 2022 05:09) (18 - 20)  SpO2: 92% (17 Nov 2022 05:50) (92% - 92%)    Parameters below as of 17 Nov 2022 05:50  Patient On (Oxygen Delivery Method): room air        PHYSICAL EXAM:  Gen: NAD, resting in bed  HEENT: Normocephalic, atraumatic  Neck: supple, no lymphadenopathy  CV: Regular rate & regular rhythm  Lungs: decreased BS at bases, no fremitus  Abdomen: Soft, BS present  Ext: Warm, well perfused  Neuro: non focal, awake  Skin: no rash, no erythema  Lines: no phlebitis    FH: Non-contributory  Social Hx: Non-contributory    TESTS & MEASUREMENTS:                        7.9    4.34  )-----------( 83       ( 17 Nov 2022 06:51 )             23.6     11-17    142  |  102  |  10  ----------------------------<  103<H>  2.9<L>   |  29  |  0.6<L>    Ca    7.8<L>      17 Nov 2022 06:51  Mg     1.4     11-17    TPro  4.8<L>  /  Alb  2.4<L>  /  TBili  0.7  /  DBili  x   /  AST  15  /  ALT  9   /  AlkPhos  90  11-17      LIVER FUNCTIONS - ( 17 Nov 2022 06:51 )  Alb: 2.4 g/dL / Pro: 4.8 g/dL / ALK PHOS: 90 U/L / ALT: 9 U/L / AST: 15 U/L / GGT: x               Babesia microti PCR, Blood. (collected 11-12-22 @ 14:15)  Source: .Blood None  Final Report (11-13-22 @ 08:54):    Babesia microti PCR    Results: NOT detected    ***************Result Note*************    The detection of Babesia microti by PCR has only been    validated for whole blood; this test has not been approved    by the US Food and Drug Administration (FDA). Performance    characteristics of this assay have been determined by    [facility]. The clinical significance    of results should be considered in conjunction with the    overall clinical presentation of the patient. Result is not    intended to be used as the sole means for clinical diagnosis    or patient management decisions.    One negative sample does not necessarily rule    out the presence of a parasitic infection.    Culture - Urine (collected 11-11-22 @ 17:10)  Source: Clean Catch Clean Catch (Midstream)  Final Report (11-12-22 @ 19:12):    <10,000 CFU/mL Normal Urogenital Patricia    Culture - Blood (collected 11-11-22 @ 15:09)  Source: .Blood Blood-Peripheral  Final Report (11-16-22 @ 23:01):    No Growth Final    Culture - Blood (collected 11-11-22 @ 15:09)  Source: .Blood Blood-Peripheral  Final Report (11-16-22 @ 23:01):    No Growth Final        Lactate, Blood: 0.8 mmol/L (11-16-22 @ 07:27)  Lactate, Blood: 2.6 mmol/L (11-14-22 @ 11:30)      INFECTIOUS DISEASES TESTING  Procalcitonin, Serum: 20.10 (11-12-22 @ 08:08)  Legionella Antigen, Urine: Negative (11-12-22 @ 07:00)  Rapid RVP Result: NotDetec (11-12-22 @ 06:06)  MRSA PCR Result.: Negative (11-12-22 @ 06:04)  Fungitell: <31 (11-11-22 @ 21:00)      INFLAMMATORY MARKERS      RADIOLOGY & ADDITIONAL TESTS:  I have personally reviewed the last available Chest xray  CXR      CT      CARDIOLOGY TESTING  12 Lead ECG:   Ventricular Rate 121 BPM    Atrial Rate 170 BPM    QRS Duration 78 ms    Q-T Interval 434 ms    QTC Calculation(Bazett) 616 ms    R Axis 15 degrees    T Axis 51 degrees    Diagnosis Line Sinus tachycardia  Nonspecific ST and T wave abnormality  Abnormal ECG    Confirmed by MIRIAM MERCER MD ([id number]) on 11/11/2022 3:06:08 PM (11-11-22 @ 14:30)      MEDICATIONS  acetaminophen     Tablet .. 650 Oral once  acyclovir   Oral Tab/Cap 400 Oral daily  albuterol/ipratropium for Nebulization 3 Nebulizer every 6 hours  allopurinol 300 Oral daily  cefepime   IVPB 2000 IV Intermittent every 8 hours  enoxaparin Injectable 40 SubCutaneous every 24 hours  potassium chloride    Tablet ER 20 Oral daily  voriconazole IVPB 388 IV Intermittent two times a day      WEIGHT  Weight (kg): 97.069 (11-11-22 @ 21:39)  Creatinine, Serum: 0.6 mg/dL (11-17-22 @ 06:51)      ANTIBIOTICS:  acyclovir   Oral Tab/Cap 400 milliGRAM(s) Oral daily  cefepime   IVPB 2000 milliGRAM(s) IV Intermittent every 8 hours  voriconazole IVPB 388 milliGRAM(s) IV Intermittent two times a day      All available historical records have been reviewed

## 2022-11-17 NOTE — PROGRESS NOTE ADULT - ASSESSMENT
Sepsis due to Left lower lobe consolidation, c/f pneumonia possibly fungal, not c/w severe sepsis with severe neutropenic fever   - blood Cx & Urine Cx NGTD , cmv, ebv neg, legionella neg  - off Vanco / cont Cefepime   - cont  Voriconazole IV for possible fungal PNA - ID request possible bronch for BAL - pulm consulted - states that bronch not warranted at this time - repeat CXR , may need repeat CT chest - afebrile x 24 hrs now  - MRSA swab neg,  RVP negative,  neutropenic precautions   - Oxygen is off - on RA now   - ID consult following , Hematology consult following  - cont neupogen 5 days total   - ANC/ pancytopenia  gradually improving   - pending serum galactomannan      AML (acute lymphocytic leukemia) s/p 2 units   ·  Plan: AML diagnosed approx 1 yr ago   - cont px with acyclovir and allopurinol   - Hem/Onc following   - Follow HG  - if Hg <7.0 consider transfusion  - T&S  - Monitor Vitals  - hold chemo for now    Pancytopenia - improving   - per hem/onc transfuse 2 units or leukoreduced and irradiated prbcs  - will monitor start neuopogen - discussed with dr sanchez     Weakness  - cont iv fluids, PT recommends DAVID, per hematology oncology they recommend home health PT, will need to d/w son Dustin     DVT px  -Lovenox     Full code.  dc plan is home in possible 72 hours, spoke with son and daughter yestrday  Sepsis due to Left lower lobe consolidation, c/f pneumonia possibly fungal, not c/w severe sepsis with severe neutropenic fever   - blood Cx & Urine Cx NGTD , cmv, ebv neg, legionella neg  - off Vanco / cont Cefepime   - cont  Voriconazole IV for possible fungal PNA - ID request possible bronch for BAL - pulm consulted - states that bronch not warranted at this time - repeat CXR , may need repeat CT chest - afebrile x 24 hrs now  - MRSA swab neg,  RVP negative,  neutropenic precautions   - Oxygen is off - on RA now   - ID consult following , Hematology consult following  - cont neupogen 5 days total   - ANC/ pancytopenia  gradually improving   - pending serum galactomannan      AML (acute lymphocytic leukemia) s/p 2 units   ·  Plan: AML diagnosed approx 1 yr ago   - cont px with acyclovir and allopurinol   - Hem/Onc following   - Follow HG  - if Hg <7.0 consider transfusion  - T&S  - Monitor Vitals  - hold chemo for now    Pancytopenia - improving   - per hem/onc transfuse 2 units or leukoreduced and irradiated prbcs  - will monitor start neuopogen - discussed with dr sanchez     magnesium and potassium deficiency - replete and recheck     Weakness  - cont iv fluids, PT recommends DAVID, per hematology oncology they recommend home health PT, will need to d/w son Dustin     DVT px  -Lovenox     Full code.  dc plan is home in possible 72 hours, spoke with son and daughter yestrday  Sepsis due to Left lower lobe consolidation, c/f pneumonia possibly fungal, not c/w severe sepsis with severe neutropenic fever   - blood Cx & Urine Cx NGTD , cmv, ebv neg, legionella neg  - off Vanco / cont Cefepime   - cont  Voriconazole IV for possible fungal PNA - ID request possible bronch for BAL - pulm consulted - states that bronch not warranted at this time - repeat CXR , may need repeat CT chest - afebrile x 24 hrs now  - MRSA swab neg,  RVP negative,  neutropenic precautions   - Oxygen is off - on RA now   - ID consult following , Hematology consult following    - ANC/ pancytopenia  gradually improving   - pending serum galactomannan      AML (acute lymphocytic leukemia) s/p 2 units   ·  Plan: AML diagnosed approx 1 yr ago   - cont px with acyclovir and allopurinol   - Hem/Onc following   - Follow HG  - if Hg <7.0 consider transfusion  - T&S  - Monitor Vitals  - hold chemo for now    Pancytopenia - improving   - per hem/onc transfuse 2 units or leukoreduced and irradiated prbcs  - will DC neupogen as neutropenia improved - discussed with dr sanchez     magnesium and potassium deficiency - replete and recheck     Weakness  - cont iv fluids, PT recommends DAVID, per hematology oncology they recommend home health PT, will need to d/w son Dustin     DVT px  -Lovenox     Full code.  dc plan is home in possible 72 hours, spoke with son and daughter yestrday

## 2022-11-18 LAB
ANION GAP SERPL CALC-SCNC: 8 MMOL/L — SIGNIFICANT CHANGE UP (ref 7–14)
BUN SERPL-MCNC: 8 MG/DL — LOW (ref 10–20)
CALCIUM SERPL-MCNC: 8.1 MG/DL — LOW (ref 8.4–10.5)
CHLORIDE SERPL-SCNC: 100 MMOL/L — SIGNIFICANT CHANGE UP (ref 98–110)
CO2 SERPL-SCNC: 31 MMOL/L — SIGNIFICANT CHANGE UP (ref 17–32)
CREAT SERPL-MCNC: 0.6 MG/DL — LOW (ref 0.7–1.5)
EGFR: 94 ML/MIN/1.73M2 — SIGNIFICANT CHANGE UP
GLUCOSE SERPL-MCNC: 102 MG/DL — HIGH (ref 70–99)
HCT VFR BLD CALC: 24.7 % — LOW (ref 37–47)
HGB BLD-MCNC: 8.2 G/DL — LOW (ref 12–16)
MAGNESIUM SERPL-MCNC: 1.9 MG/DL — SIGNIFICANT CHANGE UP (ref 1.8–2.4)
MCHC RBC-ENTMCNC: 31.3 PG — HIGH (ref 27–31)
MCHC RBC-ENTMCNC: 33.2 G/DL — SIGNIFICANT CHANGE UP (ref 32–37)
MCV RBC AUTO: 94.3 FL — SIGNIFICANT CHANGE UP (ref 81–99)
NRBC # BLD: 0 /100 WBCS — SIGNIFICANT CHANGE UP (ref 0–0)
PLATELET # BLD AUTO: 85 K/UL — LOW (ref 130–400)
POTASSIUM SERPL-MCNC: 3 MMOL/L — LOW (ref 3.5–5)
POTASSIUM SERPL-SCNC: 3 MMOL/L — LOW (ref 3.5–5)
RBC # BLD: 2.62 M/UL — LOW (ref 4.2–5.4)
RBC # FLD: 20.3 % — HIGH (ref 11.5–14.5)
SODIUM SERPL-SCNC: 139 MMOL/L — SIGNIFICANT CHANGE UP (ref 135–146)
WBC # BLD: 4.68 K/UL — LOW (ref 4.8–10.8)
WBC # FLD AUTO: 4.68 K/UL — LOW (ref 4.8–10.8)

## 2022-11-18 PROCEDURE — 99233 SBSQ HOSP IP/OBS HIGH 50: CPT

## 2022-11-18 PROCEDURE — 99232 SBSQ HOSP IP/OBS MODERATE 35: CPT

## 2022-11-18 PROCEDURE — 71250 CT THORAX DX C-: CPT | Mod: 26

## 2022-11-18 RX ORDER — MAGNESIUM SULFATE 500 MG/ML
2 VIAL (ML) INJECTION ONCE
Refills: 0 | Status: COMPLETED | OUTPATIENT
Start: 2022-11-18 | End: 2022-11-19

## 2022-11-18 RX ORDER — CEFEPIME 1 G/1
2000 INJECTION, POWDER, FOR SOLUTION INTRAMUSCULAR; INTRAVENOUS EVERY 8 HOURS
Refills: 0 | Status: DISCONTINUED | OUTPATIENT
Start: 2022-11-18 | End: 2022-11-21

## 2022-11-18 RX ORDER — POTASSIUM CHLORIDE 20 MEQ
20 PACKET (EA) ORAL
Refills: 0 | Status: COMPLETED | OUTPATIENT
Start: 2022-11-18 | End: 2022-11-18

## 2022-11-18 RX ORDER — POTASSIUM CHLORIDE 20 MEQ
40 PACKET (EA) ORAL DAILY
Refills: 0 | Status: COMPLETED | OUTPATIENT
Start: 2022-11-18 | End: 2022-11-20

## 2022-11-18 RX ORDER — POTASSIUM CHLORIDE 20 MEQ
40 PACKET (EA) ORAL DAILY
Refills: 0 | Status: DISCONTINUED | OUTPATIENT
Start: 2022-11-18 | End: 2022-11-18

## 2022-11-18 RX ADMIN — CEFEPIME 100 MILLIGRAM(S): 1 INJECTION, POWDER, FOR SOLUTION INTRAMUSCULAR; INTRAVENOUS at 15:31

## 2022-11-18 RX ADMIN — VORICONAZOLE 125 MILLIGRAM(S): 10 INJECTION, POWDER, LYOPHILIZED, FOR SOLUTION INTRAVENOUS at 05:31

## 2022-11-18 RX ADMIN — Medication 50 MILLIEQUIVALENT(S): at 21:31

## 2022-11-18 RX ADMIN — VORICONAZOLE 125 MILLIGRAM(S): 10 INJECTION, POWDER, LYOPHILIZED, FOR SOLUTION INTRAVENOUS at 18:24

## 2022-11-18 RX ADMIN — Medication 3 MILLILITER(S): at 08:20

## 2022-11-18 RX ADMIN — Medication 3 MILLILITER(S): at 14:05

## 2022-11-18 RX ADMIN — Medication 300 MILLIGRAM(S): at 13:21

## 2022-11-18 RX ADMIN — Medication 40 MILLIEQUIVALENT(S): at 23:58

## 2022-11-18 RX ADMIN — Medication 50 MILLIEQUIVALENT(S): at 18:23

## 2022-11-18 RX ADMIN — CEFEPIME 100 MILLIGRAM(S): 1 INJECTION, POWDER, FOR SOLUTION INTRAMUSCULAR; INTRAVENOUS at 21:33

## 2022-11-18 RX ADMIN — ENOXAPARIN SODIUM 40 MILLIGRAM(S): 100 INJECTION SUBCUTANEOUS at 23:59

## 2022-11-18 RX ADMIN — Medication 50 MILLIEQUIVALENT(S): at 15:32

## 2022-11-18 RX ADMIN — Medication 20 MILLIEQUIVALENT(S): at 13:22

## 2022-11-18 RX ADMIN — Medication 400 MILLIGRAM(S): at 13:21

## 2022-11-18 RX ADMIN — CEFEPIME 100 MILLIGRAM(S): 1 INJECTION, POWDER, FOR SOLUTION INTRAMUSCULAR; INTRAVENOUS at 08:14

## 2022-11-18 NOTE — PROGRESS NOTE ADULT - ASSESSMENT
Sepsis  resolved - due to Left lower lobe consolidation, c/f pneumonia possibly fungal, not c/w severe sepsis with severe neutropenic fever   - blood Cx & Urine Cx NGTD , cmv, ebv neg, legionella neg  - off Vanco / DC Cefepime  per ID - no longer neutropenic   - cont  Voriconazole IV for possible fungal PNA- aspergilllus  - ID request possible bronch for BAL - pulm consulted - states that bronch not warranted at this time - repeat CXR with worsening left sided opacity - check CT chest non con  ,remains - afebrile x >24 hrs now  - MRSA swab neg,  RVP negative,  neutropenic precautions   - Oxygen is off - on RA now   - ID consult following , Hematology consult following    - ANC/ pancytopenia  gradually improving   - pending serum galactomannan      AML (acute lymphocytic leukemia) s/p 2 units   ·  Plan: AML diagnosed approx 1 yr ago   - cont px with acyclovir and allopurinol   - Hem/Onc following   - Follow HG  - if Hg <7.0 consider transfusion  - T&S  - Monitor Vitals  - hold chemo for now    Pancytopenia - improving   - per hem/onc transfuse 2 units or leukoreduced and irradiated prbcs  - will DC neupogen as neutropenia improved - discussed with dr sanchez     magnesium and potassium deficiency - replete and recheck     Weakness  - cont iv fluids, PT recommends DAVID, per hematology oncology they recommend home health PT, will need to d/w son Dustin     DVT px  -Lovenox     Full code.  dc plan is home in possible 72 hours, spoke with son and daughter   follow up CT chest, electrolytes, galactomanan, ID follow up for antifungal plan      Sepsis  resolved - due to Left lower lobe consolidation, c/f pneumonia possibly fungal, suspected GNR PNA  - blood Cx & Urine Cx NGTD , cmv, ebv neg, legionella neg  - off Vanco / continue Cefepime  per ID - no longer neutropenic   - cont  Voriconazole IV for possible fungal PNA- aspergilllus  - ID request possible bronch for BAL - pulm consulted - states that bronch not warranted at this time - repeat CXR with worsening left sided opacity - remains - afebrile x >24 hrs now  - MRSA swab neg,  RVP negative,  neutropenic precautions   - Oxygen is off - on RA now   - ID consult following , Hematology consult following    - ANC/ pancytopenia  gradually improving   - pending serum galactomannan     < from: CT Chest No Cont (11.18.22 @ 10:10) >  IMPRESSION:    Interval worsening of bilateral pleural effusions with a new   consolidative process in the left lung consistent with pneumonia.      discussed plan with Dr Sharp and Dr Quezada - cont to monitor until early next week on iv abxs and iv antifungals - CT chest reveiwed myself and with team - repeat CXR on SUNDAY - Pulm will decide early next week if bronch warranted if not improving   will need a few weeks worth of oral antibiotics / antifungals      < end of copied text >       AML (acute lymphocytic leukemia) s/p 2 units   ·  Plan: AML diagnosed approx 1 yr ago   - cont px with acyclovir and allopurinol   - Hem/Onc following   - Follow HG  - if Hg <7.0 consider transfusion  - T&S  - Monitor Vitals  - hold chemo for now    Pancytopenia - improving   - per hem/onc transfused 2 units of leukoreduced and irradiated prbcs  - will DC neupogen as neutropenia improved - discussed with dr sanchez     magnesium and potassium deficiency - replete and recheck - increase oral daily supplemet to 40meq, supplement mag and give iv today    Weakness  - can hhold iv fluids for now - 3 weeks post chemo - no concern for tumor lysis at this itme,     DVT px  -Lovenox     Full code.  dc plan is home in possible 72 hours, spoke with son and daughter   follow up CXR on sunday, electrolytes, galactomanan, ID follow up for antifungal/antibiotic plan

## 2022-11-18 NOTE — PROGRESS NOTE ADULT - SUBJECTIVE AND OBJECTIVE BOX
Patient is a 77y old  Female who presents with a chief complaint of Fever (18 Nov 2022 08:54)      INTERVAL HISTORY/overnight events  over all sy=table no temp for 48 hrs   cxr reviewed LL opacity       Vital Signs Last 24 Hrs  T(C): 37.2 (18 Nov 2022 04:51), Max: 37.2 (18 Nov 2022 04:51)  T(F): 98.9 (18 Nov 2022 04:51), Max: 98.9 (18 Nov 2022 04:51)  HR: 90 (18 Nov 2022 04:51) (90 - 98)  BP: 120/56 (18 Nov 2022 04:51) (115/56 - 120/56)  BP(mean): --  RR: 16 (18 Nov 2022 04:51) (16 - 20)  SpO2: --      Daily     Daily   I&O's Summary    17 Nov 2022 07:01  -  18 Nov 2022 07:00  --------------------------------------------------------  IN: 0 mL / OUT: 1100 mL / NET: -1100 mL        Physical Examination:    General: No acute distress.  Alert, oriented, interactive, nonfocal    HEENT: Pupils equal, reactive to light.  Symmetric.    PULM: Clear to auscultation bilaterally, no significant sputum production    CVS: Regular rate and rhythm, no murmurs, rubs, or gallops    ABD: Soft, nondistended, nontender, normoactive bowel sounds, no masses    EXT: No edema, nontender    SKIN: Warm and well perfused, no rashes noted.    Neurology:    Musculoskeletal : Move all extremety         Lab Results:                        8.2    4.68  )-----------( 85       ( 18 Nov 2022 08:02 )             24.7     18 Nov 2022 08:02    139    |  100    |  8      ----------------------------<  102    3.0     |  31     |  0.6      Ca    8.1        18 Nov 2022 08:02  Mg     1.9       18 Nov 2022 08:02                Microbiology      Medication:  MEDICATIONS  (STANDING):  acetaminophen     Tablet .. 650 milliGRAM(s) Oral once  acyclovir   Oral Tab/Cap 400 milliGRAM(s) Oral daily  albuterol/ipratropium for Nebulization 3 milliLiter(s) Nebulizer every 6 hours  allopurinol 300 milliGRAM(s) Oral daily  enoxaparin Injectable 40 milliGRAM(s) SubCutaneous every 24 hours  potassium chloride    Tablet ER 20 milliEquivalent(s) Oral daily  voriconazole IVPB 388 milliGRAM(s) IV Intermittent two times a day    MEDICATIONS  (PRN):  acetaminophen     Tablet .. 650 milliGRAM(s) Oral every 6 hours PRN Temp greater or equal to 38C (100.4F), Mild Pain (1 - 3)  aluminum hydroxide/magnesium hydroxide/simethicone Suspension 30 milliLiter(s) Oral every 4 hours PRN Dyspepsia  ondansetron Injectable 4 milliGRAM(s) IV Push every 8 hours PRN Nausea and/or Vomiting        IMAGING STUDIES:

## 2022-11-18 NOTE — PROGRESS NOTE ADULT - TIME BILLING
I have personally seen and examined this patient.    I have reviewed all pertinent clinical information and reviewed all relevant imaging and diagnostic studies personally.   I counseled the patient about diagnostic testing and treatment plan. All questions were answered.   I discussed recommendations with the primary team.
I have personally seen and examined this patient.    I have reviewed all pertinent clinical information and reviewed all relevant imaging and diagnostic studies personally.   I counseled the patient about diagnostic testing and treatment plan. All questions were answered.   I discussed recommendations with the primary team.
Follow up visit. Examined the patient  Reviewed the charts.   Discussed with patient's daughter.  Coordinated care with ID whether patient can go home on anti-biotics, Levoflaxin
I have personally seen and examined this patient.    I have reviewed all pertinent clinical information and reviewed all relevant imaging and diagnostic studies personally.   I counseled the patient about diagnostic testing and treatment plan. All questions were answered.   I discussed recommendations with the primary team.
Follow up visit to monitor patient's progress. Examined the patient. Reviewed the charts. Coordinated care with ID and Pulmonary.  Awaiting Pulmonary Consult for bronchoscopy and cultures.

## 2022-11-18 NOTE — PROGRESS NOTE ADULT - ASSESSMENT
Impression:  LL opacity   neutropenia         Plan:  no fever in the last 24 hrs   continue abx   check fungitell \  bld cx   can repeat ct scan non contrast    follow ID

## 2022-11-18 NOTE — PROGRESS NOTE ADULT - SUBJECTIVE AND OBJECTIVE BOX
SHERWIN, MARAL  77y, Female  Allergy: No Known Allergies      LOS  7d    CHIEF COMPLAINT: Fever (18 Nov 2022 10:30)      INTERVAL EVENTS/HPI  - No acute events overnight  - T(F): , Max: 99.3 (11-18-22 @ 20:36)  - WBC Count: 4.68 (11-18-22 @ 08:02)  WBC Count: 4.34 (11-17-22 @ 06:51)     - Creatinine, Serum: 0.6 (11-18-22 @ 08:02)  Creatinine, Serum: 0.6 (11-17-22 @ 06:51)       ROS  General: Denies rigors, nightsweats  HEENT: Denies headache, rhinorrhea, sore throat, eye pain  CV: Denies CP, palpitations  PULM: Denies wheezing, hemoptysis  GI: Denies hematemesis, hematochezia, melena  : Denies discharge, hematuria  MSK: Denies arthralgias, myalgias  SKIN: Denies rash, lesions  NEURO: Denies paresthesias, weakness  PSYCH: Denies depression, anxiety    VITALS:  T(F): 99.3, Max: 99.3 (11-18-22 @ 20:36)  HR: 93  BP: 128/60  RR: 16Vital Signs Last 24 Hrs  T(C): 37.4 (18 Nov 2022 20:36), Max: 37.4 (18 Nov 2022 20:36)  T(F): 99.3 (18 Nov 2022 20:36), Max: 99.3 (18 Nov 2022 20:36)  HR: 93 (18 Nov 2022 20:36) (90 - 94)  BP: 128/60 (18 Nov 2022 20:36) (120/56 - 128/60)  BP(mean): --  RR: 16 (18 Nov 2022 20:36) (16 - 16)  SpO2: --        PHYSICAL EXAM:  Gen: NAD, resting in bed  HEENT: Normocephalic, atraumatic  Neck: supple, no lymphadenopathy  CV: Regular rate & regular rhythm  Lungs: decreased BS at bases, no fremitus  Abdomen: Soft, BS present  Ext: Warm, well perfused  Neuro: non focal, awake  Skin: no rash, no erythema  Lines: no phlebitis    FH: Non-contributory  Social Hx: Non-contributory    TESTS & MEASUREMENTS:                        8.2    4.68  )-----------( 85       ( 18 Nov 2022 08:02 )             24.7     11-18    139  |  100  |  8<L>  ----------------------------<  102<H>  3.0<L>   |  31  |  0.6<L>    Ca    8.1<L>      18 Nov 2022 08:02  Mg     1.9     11-18    TPro  4.8<L>  /  Alb  2.4<L>  /  TBili  0.7  /  DBili  x   /  AST  15  /  ALT  9   /  AlkPhos  90  11-17      LIVER FUNCTIONS - ( 17 Nov 2022 06:51 )  Alb: 2.4 g/dL / Pro: 4.8 g/dL / ALK PHOS: 90 U/L / ALT: 9 U/L / AST: 15 U/L / GGT: x               Babesia microti PCR, Blood. (collected 11-12-22 @ 14:15)  Source: .Blood None  Final Report (11-13-22 @ 08:54):    Babesia microti PCR    Results: NOT detected    ***************Result Note*************    The detection of Babesia microti by PCR has only been    validated for whole blood; this test has not been approved    by the US Food and Drug Administration (FDA). Performance    characteristics of this assay have been determined by    [facility]. The clinical significance    of results should be considered in conjunction with the    overall clinical presentation of the patient. Result is not    intended to be used as the sole means for clinical diagnosis    or patient management decisions.    One negative sample does not necessarily rule    out the presence of a parasitic infection.    Culture - Urine (collected 11-11-22 @ 17:10)  Source: Clean Catch Clean Catch (Midstream)  Final Report (11-12-22 @ 19:12):    <10,000 CFU/mL Normal Urogenital Patricia    Culture - Blood (collected 11-11-22 @ 15:09)  Source: .Blood Blood-Peripheral  Final Report (11-16-22 @ 23:01):    No Growth Final    Culture - Blood (collected 11-11-22 @ 15:09)  Source: .Blood Blood-Peripheral  Final Report (11-16-22 @ 23:01):    No Growth Final        Lactate, Blood: 0.8 mmol/L (11-16-22 @ 07:27)  Lactate, Blood: 2.6 mmol/L (11-14-22 @ 11:30)      INFECTIOUS DISEASES TESTING  Procalcitonin, Serum: 20.10 (11-12-22 @ 08:08)  Legionella Antigen, Urine: Negative (11-12-22 @ 07:00)  Rapid RVP Result: NotDetec (11-12-22 @ 06:06)  MRSA PCR Result.: Negative (11-12-22 @ 06:04)  Fungitell: <31 (11-11-22 @ 21:00)      INFLAMMATORY MARKERS      RADIOLOGY & ADDITIONAL TESTS:  I have personally reviewed the last available Chest xray  CXR      CT  CT Chest No Cont:   ACC: [id number] EXAM:  CT CHEST                          PROCEDURE DATE:  11/18/2022          INTERPRETATION:  Reason for Exam:  Fever.  CT of the chest was performed from the thoracic inlet to the level of the   adrenal glands without contrast injection. Coronal and sagittal images   have been submitted.    Comparison: 7 days prior    Findings:    Tubes/Lines: Right-sided Mediport    Lungs, Pleura, and Airways:Tracheobronchial tree is patent.    Interval worsening of bilateral pleural effusions with a left lower lung   field consolidative process consisting of air bronchograms. No cavitation   is seen.    Mediastinum/Lymph Nodes:Reactive mediastinal lymph nodes. Reactive hilar   lymph nodes.    Heart/Great Vessels: Heart size is top normal. Pericardial effusion is   seen.    Abdomen: Normal    Bones and soft tissues: Within normal limits.    IMPRESSION:    Interval worsening of bilateral pleural effusions with a new   consolidative process in the left lung consistent with pneumonia.    --- End of Report ---            NICHOLE JOHNSON MD; Attending Interventional Radiologist  This document has been electronically signed. Nov 18 2022 10:19AM (11-18-22 @ 10:10)      CARDIOLOGY TESTING  12 Lead ECG:   Ventricular Rate 121 BPM    Atrial Rate 170 BPM    QRS Duration 78 ms    Q-T Interval 434 ms    QTC Calculation(Bazett) 616 ms    R Axis 15 degrees    T Axis 51 degrees    Diagnosis Line Sinus tachycardia  Nonspecific ST and T wave abnormality  Abnormal ECG    Confirmed by MIRIAM MERCER MD ([id number]) on 11/11/2022 3:06:08 PM (11-11-22 @ 14:30)      MEDICATIONS  acetaminophen     Tablet .. 650 Oral once  acyclovir   Oral Tab/Cap 400 Oral daily  albuterol/ipratropium for Nebulization 3 Nebulizer every 6 hours  allopurinol 300 Oral daily  cefepime   IVPB 2000 IV Intermittent every 8 hours  enoxaparin Injectable 40 SubCutaneous every 24 hours  magnesium sulfate  IVPB 2 IV Intermittent once  potassium chloride   Powder 40 Oral daily  potassium chloride  20 mEq/100 mL IVPB 20 IV Intermittent every 2 hours  voriconazole IVPB 388 IV Intermittent two times a day      WEIGHT  Weight (kg): 97.069 (11-11-22 @ 21:39)  Creatinine, Serum: 0.6 mg/dL (11-18-22 @ 08:02)      ANTIBIOTICS:  acyclovir   Oral Tab/Cap 400 milliGRAM(s) Oral daily  cefepime   IVPB 2000 milliGRAM(s) IV Intermittent every 8 hours  voriconazole IVPB 388 milliGRAM(s) IV Intermittent two times a day      All available historical records have been reviewed

## 2022-11-18 NOTE — PROGRESS NOTE ADULT - SUBJECTIVE AND OBJECTIVE BOX
[facility] [id number]    Subjective/Interval History   dry cough today,feels better , eating better   with weakness.       ROS  no chest pain     PHYSICAL EXAM  ICU Vital Signs Last 24 Hrs  T(C): 37.2 (18 Nov 2022 04:51), Max: 37.2 (18 Nov 2022 04:51)  T(F): 98.9 (18 Nov 2022 04:51), Max: 98.9 (18 Nov 2022 04:51)  HR: 90 (18 Nov 2022 04:51) (90 - 98)  BP: 120/56 (18 Nov 2022 04:51) (115/56 - 120/56)  BP(mean): --  ABP: --  ABP(mean): --  RR: 16 (18 Nov 2022 04:51) (16 - 20)  SpO2: --                      GA : AAOX3, NAD   HEENT: PERRLA, EOMI  NECK: no JVD, no thyromegaly   CVS: S1 S2 no murmur no rubs no gallop  RESP:   no wheeze, no rhonchi, LLL  rales right chest port clean  ABD: Soft, NT, ND, tympanic, no rebound or guarding   : No Waldron, No CVA tenderness   EXT; no edema , no cyanosis  MSK: No ML spinal tenderness, normal ROM   NEURO: AAOX3, no new focal deficits     MEDICATIONS  (STANDING):  acetaminophen     Tablet .. 650 milliGRAM(s) Oral once  acyclovir   Oral Tab/Cap 400 milliGRAM(s) Oral daily  albuterol/ipratropium for Nebulization 3 milliLiter(s) Nebulizer every 6 hours  allopurinol 300 milliGRAM(s) Oral daily  enoxaparin Injectable 40 milliGRAM(s) SubCutaneous every 24 hours  potassium chloride    Tablet ER 20 milliEquivalent(s) Oral daily  voriconazole IVPB 388 milliGRAM(s) IV Intermittent two times a day    MEDICATIONS  (PRN):  acetaminophen     Tablet .. 650 milliGRAM(s) Oral every 6 hours PRN Temp greater or equal to 38C (100.4F), Mild Pain (1 - 3)  aluminum hydroxide/magnesium hydroxide/simethicone Suspension 30 milliLiter(s) Oral every 4 hours PRN Dyspepsia  ondansetron Injectable 4 milliGRAM(s) IV Push every 8 hours PRN Nausea and/or Vomiting        LABS/ IMAGING                        7.9    4.34  )-----------( 83       ( 17 Nov 2022 06:51 )             23.6                           7.9    2.00  )-----------( 72       ( 16 Nov 2022 07:27 )             23.4                           7.7    0.71  )-----------( 61       ( 14 Nov 2022 08:14 )             22.1     11-17    142  |  102  |  10  ----------------------------<  103<H>  2.9<L>   |  29  |  0.6<L>    Ca    7.8<L>      17 Nov 2022 06:51  Mg     1.4     11-17    TPro  4.8<L>  /  Alb  2.4<L>  /  TBili  0.7  /  DBili  x   /  AST  15  /  ALT  9   /  AlkPhos  90  11-17 11-16    x   |  x   |  x   ----------------------------<  x   2.6<LL>   |  x   |  x     Ca    7.9<L>      16 Nov 2022 07:27  Mg     1.5     11-16 11-14    138  |  101  |  13  ----------------------------<  141<H>  3.2<L>   |  26  |  0.7    Ca    8.4      14 Nov 2022 08:14      CAPILLARY BLOOD GLUCOSE                   MARAL [id number]    Subjective/Interval History   dry cough today,feels better , eating better   with weakness.       ROS  no chest pain     PHYSICAL EXAM  ICU Vital Signs Last 24 Hrs  T(C): 37.2 (18 Nov 2022 04:51), Max: 37.2 (18 Nov 2022 04:51)  T(F): 98.9 (18 Nov 2022 04:51), Max: 98.9 (18 Nov 2022 04:51)  HR: 90 (18 Nov 2022 04:51) (90 - 98)  BP: 120/56 (18 Nov 2022 04:51) (115/56 - 120/56)  BP(mean): --  ABP: --  ABP(mean): --  RR: 16 (18 Nov 2022 04:51) (16 - 20)  SpO2: --                      GA : AAOX3, NAD   HEENT: PERRLA, EOMI  NECK: no JVD, no thyromegaly   CVS: S1 S2 no murmur no rubs no gallop  RESP:   no wheeze, no rhonchi, LLL  rales right chest port clean  ABD: Soft, NT, ND, tympanic, no rebound or guarding   : No Waldron, No CVA tenderness   EXT; no edema , no cyanosis  MSK: No ML spinal tenderness, normal ROM   NEURO: AAOX3, no new focal deficits     MEDICATIONS  (STANDING):  acetaminophen     Tablet .. 650 milliGRAM(s) Oral once  acyclovir   Oral Tab/Cap 400 milliGRAM(s) Oral daily  albuterol/ipratropium for Nebulization 3 milliLiter(s) Nebulizer every 6 hours  allopurinol 300 milliGRAM(s) Oral daily  cefepime   IVPB 2000 milliGRAM(s) IV Intermittent every 8 hours  enoxaparin Injectable 40 milliGRAM(s) SubCutaneous every 24 hours  magnesium sulfate  IVPB 2 Gram(s) IV Intermittent once  potassium chloride   Powder 40 milliEquivalent(s) Oral daily  potassium chloride  20 mEq/100 mL IVPB 20 milliEquivalent(s) IV Intermittent every 2 hours  voriconazole IVPB 388 milliGRAM(s) IV Intermittent two times a day    MEDICATIONS  (PRN):  acetaminophen     Tablet .. 650 milliGRAM(s) Oral every 6 hours PRN Temp greater or equal to 38C (100.4F), Mild Pain (1 - 3)  aluminum hydroxide/magnesium hydroxide/simethicone Suspension 30 milliLiter(s) Oral every 4 hours PRN Dyspepsia  ondansetron Injectable 4 milliGRAM(s) IV Push every 8 hours PRN Nausea and/or Vomiting          LABS/ IMAGING                        8.2    4.68  )-----------( 85       ( 18 Nov 2022 08:02 )             24.7                           7.9    4.34  )-----------( 83       ( 17 Nov 2022 06:51 )             23.6                           7.9    2.00  )-----------( 72       ( 16 Nov 2022 07:27 )             23.4                           7.7    0.71  )-----------( 61       ( 14 Nov 2022 08:14 )             22.1     11-18    139  |  100  |  8<L>  ----------------------------<  102<H>  3.0<L>   |  31  |  0.6<L>    Ca    8.1<L>      18 Nov 2022 08:02  Mg     1.9     11-18    TPro  4.8<L>  /  Alb  2.4<L>  /  TBili  0.7  /  DBili  x   /  AST  15  /  ALT  9   /  AlkPhos  90  11-17 11-17    142  |  102  |  10  ----------------------------<  103<H>  2.9<L>   |  29  |  0.6<L>    Ca    7.8<L>      17 Nov 2022 06:51  Mg     1.4     11-17    TPro  4.8<L>  /  Alb  2.4<L>  /  TBili  0.7  /  DBili  x   /  AST  15  /  ALT  9   /  AlkPhos  90  11-17 11-16    x   |  x   |  x   ----------------------------<  x   2.6<LL>   |  x   |  x     Ca    7.9<L>      16 Nov 2022 07:27  Mg     1.5     11-16 11-14    138  |  101  |  13  ----------------------------<  141<H>  3.2<L>   |  26  |  0.7    Ca    8.4      14 Nov 2022 08:14      CAPILLARY BLOOD GLUCOSE

## 2022-11-18 NOTE — CHART NOTE - NSCHARTNOTESELECT_GEN_ALL_CORE
Event Note
MED PA/Event Note
Vancomycin/Event Note
brief note
Event Note
Event Note

## 2022-11-18 NOTE — CHART NOTE - NSCHARTNOTEFT_GEN_A_CORE
Spoke to RN, will order vancomycin level this morning (with 4:30 am rounds) but will continue same dosing for now
was alerted by lab, pt's K+ is 2.6 improved from 2.3, will order K rider 20meq x 3, f/u potassium & mag in AM.
CAlled by lab for K 2.3 today.    Pt already receiving PO potassium daily.    Will order K rider x 3 doses and repeat K 10 pm
CAlled by nurse patient refuses kcl powder   Plan change to tablet
I spoke to ID: Dr Dr Ellison:  No more Vancomycin due to Blood C+S: Negative and MRSA of nares : Negative.
Pt w/ Hg of 6.2 today w/ platelets 45.     Type and screen drawn.     Risks and benefits to blood transfusion discussed with the patient - consent form signed and placed in chart.     Discussed case w/ heme/onc Dr. Funes - requesting 2u PRBCs.     Order placed for 2u PRBCs.    Above d/w Dr. Bryant
Will stop brookso as per SEBASTIÁN Sultana
vanco trough 36 yesterday will hold am dose of vanco and order random vanco level

## 2022-11-18 NOTE — PROGRESS NOTE ADULT - ASSESSMENT
ASSESSMENT  76 y/o female with PMH of AML on chemo (follows with oncologist Dr. Marsh) last treatment was 2 weeks ago.  Pt was brought to hospital for weakness, dizziness, vomiting and fever TMax 104 at home. As per daughter, pt has been fatigued the last 3 days associated with malaise and vomiting (dry heaves) that started today.     IMPRESSION  #Neutropenic fever with Severe sepsis on admission T>101 P>90 WBC < 4 lactic acidosis -- IFI? vs Bacterial pneumonia     Given CT chest finding of focal opacity and pt is neutropenic and not on antifungal ppx cannot rule out IFI         Port- clean    No real localizing symptoms     UA without significant pyuria     Rapid RVP Result: NotDetec (11-12-22 @ 06:06)    MRSA PCR Result.: Negative (11-12-22 @ 06:04)    CT Chest  The central tracheobronchial tree is patent: There is a focal opacity in the left infrahilar region, medially and  posteriorly in the left lower lobe. Margins are ill-defined. This may   represent focal area of pneumonia, although tumor cannot be excluded. In  the absence of previous scans for comparison, a PET/CT or a short-term  follow-up CT scan in 3 months is recommended.  Mild reticular opacities  are noted at the lung bases. There is no pleural effusion. There is no   pneumothorax.    Fungitell: <31  (11.11.22 @ 21:00)          #CT Mild hepatosplenomegaly.  - Babesia PCR negative  - CMV PCR negative  - EBV IgG+    #AML on chemo   #Morbid Obesity BMI (kg/m2): 36.7  Creatinine, Serum: 0.9 (11-12-22 @ 08:08)    Weight (kg): 97.069 (11-11-22 @ 21:39)    RECOMMENDATIONS  - follow-up serum galactomannan   - CT Chest reviewed -- more consolidative process, possibly bacterial given appearance -- clinically well, could be radiologic lag with WBC recruitment   - continue cefepime 2g q 8 hours   - continue voriconazole 4 mg/kg q 12 hours for now    - repeat CXR on Sunday    - repeat EKG to ensure QTC not prolonged     I will be unavailable by [employer] over the weekend.   Can reach via Teams Messenger.   For urgent matters, please call ID attending on call.

## 2022-11-19 LAB
ALBUMIN SERPL ELPH-MCNC: 2.7 G/DL — LOW (ref 3.5–5.2)
ALP SERPL-CCNC: 99 U/L — SIGNIFICANT CHANGE UP (ref 30–115)
ALT FLD-CCNC: 12 U/L — SIGNIFICANT CHANGE UP (ref 0–41)
ANION GAP SERPL CALC-SCNC: 9 MMOL/L — SIGNIFICANT CHANGE UP (ref 7–14)
AST SERPL-CCNC: 25 U/L — SIGNIFICANT CHANGE UP (ref 0–41)
BILIRUB SERPL-MCNC: 0.5 MG/DL — SIGNIFICANT CHANGE UP (ref 0.2–1.2)
BUN SERPL-MCNC: 7 MG/DL — LOW (ref 10–20)
CALCIUM SERPL-MCNC: 7.9 MG/DL — LOW (ref 8.4–10.5)
CHLORIDE SERPL-SCNC: 104 MMOL/L — SIGNIFICANT CHANGE UP (ref 98–110)
CO2 SERPL-SCNC: 27 MMOL/L — SIGNIFICANT CHANGE UP (ref 17–32)
CREAT SERPL-MCNC: 0.6 MG/DL — LOW (ref 0.7–1.5)
EGFR: 94 ML/MIN/1.73M2 — SIGNIFICANT CHANGE UP
GALACTOMANNAN AG SERPL-ACNC: 0.09 INDEX — SIGNIFICANT CHANGE UP (ref 0–0.49)
GLUCOSE SERPL-MCNC: 146 MG/DL — HIGH (ref 70–99)
HCT VFR BLD CALC: 24.9 % — LOW (ref 37–47)
HGB BLD-MCNC: 8 G/DL — LOW (ref 12–16)
MAGNESIUM SERPL-MCNC: 2 MG/DL — SIGNIFICANT CHANGE UP (ref 1.8–2.4)
MCHC RBC-ENTMCNC: 31.5 PG — HIGH (ref 27–31)
MCHC RBC-ENTMCNC: 32.1 G/DL — SIGNIFICANT CHANGE UP (ref 32–37)
MCV RBC AUTO: 98 FL — SIGNIFICANT CHANGE UP (ref 81–99)
NRBC # BLD: 0 /100 WBCS — SIGNIFICANT CHANGE UP (ref 0–0)
PLATELET # BLD AUTO: 87 K/UL — LOW (ref 130–400)
POTASSIUM SERPL-MCNC: 3.6 MMOL/L — SIGNIFICANT CHANGE UP (ref 3.5–5)
POTASSIUM SERPL-SCNC: 3.6 MMOL/L — SIGNIFICANT CHANGE UP (ref 3.5–5)
PROT SERPL-MCNC: 4.8 G/DL — LOW (ref 6–8)
RBC # BLD: 2.54 M/UL — LOW (ref 4.2–5.4)
RBC # FLD: 20.6 % — HIGH (ref 11.5–14.5)
SODIUM SERPL-SCNC: 140 MMOL/L — SIGNIFICANT CHANGE UP (ref 135–146)
WBC # BLD: 3.4 K/UL — LOW (ref 4.8–10.8)
WBC # FLD AUTO: 3.4 K/UL — LOW (ref 4.8–10.8)

## 2022-11-19 PROCEDURE — 99232 SBSQ HOSP IP/OBS MODERATE 35: CPT

## 2022-11-19 RX ADMIN — VORICONAZOLE 125 MILLIGRAM(S): 10 INJECTION, POWDER, LYOPHILIZED, FOR SOLUTION INTRAVENOUS at 17:33

## 2022-11-19 RX ADMIN — Medication 300 MILLIGRAM(S): at 10:53

## 2022-11-19 RX ADMIN — CEFEPIME 100 MILLIGRAM(S): 1 INJECTION, POWDER, FOR SOLUTION INTRAMUSCULAR; INTRAVENOUS at 05:38

## 2022-11-19 RX ADMIN — VORICONAZOLE 125 MILLIGRAM(S): 10 INJECTION, POWDER, LYOPHILIZED, FOR SOLUTION INTRAVENOUS at 05:47

## 2022-11-19 RX ADMIN — Medication 3 MILLILITER(S): at 14:38

## 2022-11-19 RX ADMIN — Medication 25 GRAM(S): at 00:00

## 2022-11-19 RX ADMIN — Medication 3 MILLILITER(S): at 08:33

## 2022-11-19 RX ADMIN — Medication 400 MILLIGRAM(S): at 10:54

## 2022-11-19 RX ADMIN — Medication 3 MILLILITER(S): at 20:43

## 2022-11-19 RX ADMIN — CEFEPIME 100 MILLIGRAM(S): 1 INJECTION, POWDER, FOR SOLUTION INTRAMUSCULAR; INTRAVENOUS at 13:33

## 2022-11-19 RX ADMIN — Medication 40 MILLIEQUIVALENT(S): at 10:54

## 2022-11-19 RX ADMIN — CEFEPIME 100 MILLIGRAM(S): 1 INJECTION, POWDER, FOR SOLUTION INTRAMUSCULAR; INTRAVENOUS at 21:16

## 2022-11-19 RX ADMIN — ENOXAPARIN SODIUM 40 MILLIGRAM(S): 100 INJECTION SUBCUTANEOUS at 22:25

## 2022-11-19 NOTE — PROGRESS NOTE ADULT - ASSESSMENT
76 y/o female with PMH of AML on chemo (follows with oncologist Dr. Marsh) last treatment was 2 weeks ago.  Pt was brought to hospital for weakness, dizziness, vomiting and fever TMax 104 at home. As per daughter, pt has been fatigued associated with malaise and vomiting (dry heaves) that started today. No cough, congestion, abdominal pain, diarrhea or dysuria. with wheeze.     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  - ID request possible bronch for BAL - pulm consulted - states that bronch not warranted at this time - repeat CXR with worsening left sided opacity - remains - afebrile x >24 hrs now  - continue cefepime 2g q 8 hours   - continue voriconazole 4 mg/kg q 12 hours for now    - repeat CXR on Sunday    - MRSA swab neg,  RVP negative,  neutropenic precautions   - Oxygen is off - on RA now   - ID consult following , Hematology consult following    - ANC/ pancytopenia  gradually improving   - galactomannan normal    < from: CT Chest No Cont (11.18.22 @ 10:10) >  IMPRESSION:    Interval worsening of bilateral pleural effusions with a new   consolidative process in the left lung consistent with pneumonia.      discussed plan with Dr Sharp and Dr Quezada - cont to monitor until early next week on iv abxs and iv antifungals - CT chest reveiwed myself and with team - repeat CXR on SUNDAY - Pulm will decide early next week if bronch warranted if not improving   will need a few weeks worth of oral antibiotics / antifungals      < end of copied text >       AML (acute lymphocytic leukemia) s/p 2 units   ·  Plan: AML diagnosed approx 1 yr ago   - cont px with acyclovir and allopurinol   - Hem/Onc following   - Follow HG  - if Hg <7.0 consider transfusion  - T&S  - Monitor Vitals  - hold chemo for now    Pancytopenia - improving   - per hem/onc transfused 2 units of leukoreduced and irradiated prbcs  - will DC neupogen as neutropenia improved - discussed with dr sanchez     magnesium and potassium deficiency - monitor    Weakness  - can hhold iv fluids for now - 3 weeks post chemo - no concern for tumor lysis at this time,     DVT px  -Lovenox     Full code.  dc plan is home in possible 72 hours, spoke with son and daughter   follow up CXR on sunday, electrolytes, ID follow up for antifungal/antibiotic plan

## 2022-11-19 NOTE — PROGRESS NOTE ADULT - SUBJECTIVE AND OBJECTIVE BOX
78 y/o female with PMH of AML on chemo (follows with oncologist Dr. Marsh) last treatment was 2 weeks ago.  Pt was brought to hospital for weakness, dizziness, vomiting and fever TMax 104 at home. As per daughter, pt has been fatigued the last 3 days associated with malaise and vomiting (dry heaves) that started today. No cough, congestion, abdominal pain, diarrhea or dysuria. with wheeze.     Today:  No new complaints.        REVIEW OF SYSTEMS:  No complaints      MEDICATIONS  (STANDING):  acetaminophen     Tablet .. 650 milliGRAM(s) Oral once  acyclovir   Oral Tab/Cap 400 milliGRAM(s) Oral daily  albuterol/ipratropium for Nebulization 3 milliLiter(s) Nebulizer every 6 hours  allopurinol 300 milliGRAM(s) Oral daily  cefepime   IVPB 2000 milliGRAM(s) IV Intermittent every 8 hours  enoxaparin Injectable 40 milliGRAM(s) SubCutaneous every 24 hours  potassium chloride    Tablet ER 40 milliEquivalent(s) Oral daily  voriconazole IVPB 388 milliGRAM(s) IV Intermittent two times a day    MEDICATIONS  (PRN):  acetaminophen     Tablet .. 650 milliGRAM(s) Oral every 6 hours PRN Temp greater or equal to 38C (100.4F), Mild Pain (1 - 3)  aluminum hydroxide/magnesium hydroxide/simethicone Suspension 30 milliLiter(s) Oral every 4 hours PRN Dyspepsia  ondansetron Injectable 4 milliGRAM(s) IV Push every 8 hours PRN Nausea and/or Vomiting      Allergies  No Known Allergies      FAMILY HISTORY:  FH: heart disease (Mother)        Vital Signs Last 24 Hrs  T(C): 37.2 (19 Nov 2022 14:21), Max: 37.4 (18 Nov 2022 20:36)  T(F): 99 (19 Nov 2022 14:21), Max: 99.3 (18 Nov 2022 20:36)  HR: 90 (19 Nov 2022 14:21) (89 - 93)  BP: 130/60 (19 Nov 2022 14:21) (128/60 - 131/59)  RR: 18 (19 Nov 2022 14:21) (16 - 18)          PHYSICAL EXAM:  GA :NAD   HEENT: PERRLA, EOMI  NECK: no JVD, no thyromegaly   CVS: S1 S2 no murmur no rubs no gallop  RESP:   no wheeze, no rhonchi, LLL  rales right chest port clean  ABD: Soft, NT, ND, tympanic, no rebound or guarding   : No Waldron, No CVA tenderness   EXT; no edema , no cyanosis  MSK: No ML spinal tenderness, normal ROM   NEURO: AAOX3, no new focal deficits      LABS:                        8.0    3.40  )-----------( 87       ( 19 Nov 2022 08:30 )             24.9     11-19    140  |  104  |  7<L>  ----------------------------<  146<H>  3.6   |  27  |  0.6<L>    Ca    7.9<L>      19 Nov 2022 08:30  Mg     2.0     11-19    TPro  4.8<L>  /  Alb  2.7<L>  /  TBili  0.5  /  DBili  x   /  AST  25  /  ALT  12  /  AlkPhos  99  11-19

## 2022-11-20 LAB
ALBUMIN SERPL ELPH-MCNC: 2.8 G/DL — LOW (ref 3.5–5.2)
ALP SERPL-CCNC: 94 U/L — SIGNIFICANT CHANGE UP (ref 30–115)
ALT FLD-CCNC: 13 U/L — SIGNIFICANT CHANGE UP (ref 0–41)
ANION GAP SERPL CALC-SCNC: 12 MMOL/L — SIGNIFICANT CHANGE UP (ref 7–14)
AST SERPL-CCNC: 25 U/L — SIGNIFICANT CHANGE UP (ref 0–41)
BASOPHILS # BLD AUTO: 0.02 K/UL — SIGNIFICANT CHANGE UP (ref 0–0.2)
BASOPHILS NFR BLD AUTO: 0.6 % — SIGNIFICANT CHANGE UP (ref 0–1)
BILIRUB SERPL-MCNC: 0.4 MG/DL — SIGNIFICANT CHANGE UP (ref 0.2–1.2)
BUN SERPL-MCNC: 7 MG/DL — LOW (ref 10–20)
CALCIUM SERPL-MCNC: 8.1 MG/DL — LOW (ref 8.4–10.5)
CHLORIDE SERPL-SCNC: 102 MMOL/L — SIGNIFICANT CHANGE UP (ref 98–110)
CO2 SERPL-SCNC: 27 MMOL/L — SIGNIFICANT CHANGE UP (ref 17–32)
CREAT SERPL-MCNC: 0.6 MG/DL — LOW (ref 0.7–1.5)
EGFR: 94 ML/MIN/1.73M2 — SIGNIFICANT CHANGE UP
EOSINOPHIL # BLD AUTO: 0.03 K/UL — SIGNIFICANT CHANGE UP (ref 0–0.7)
EOSINOPHIL NFR BLD AUTO: 0.9 % — SIGNIFICANT CHANGE UP (ref 0–8)
GLUCOSE SERPL-MCNC: 136 MG/DL — HIGH (ref 70–99)
HCT VFR BLD CALC: 25.6 % — LOW (ref 37–47)
HGB BLD-MCNC: 8.3 G/DL — LOW (ref 12–16)
IMM GRANULOCYTES NFR BLD AUTO: 2.2 % — HIGH (ref 0.1–0.3)
LYMPHOCYTES # BLD AUTO: 0.58 K/UL — LOW (ref 1.2–3.4)
LYMPHOCYTES # BLD AUTO: 18.1 % — LOW (ref 20.5–51.1)
MAGNESIUM SERPL-MCNC: 1.7 MG/DL — LOW (ref 1.8–2.4)
MCHC RBC-ENTMCNC: 31.7 PG — HIGH (ref 27–31)
MCHC RBC-ENTMCNC: 32.4 G/DL — SIGNIFICANT CHANGE UP (ref 32–37)
MCV RBC AUTO: 97.7 FL — SIGNIFICANT CHANGE UP (ref 81–99)
MONOCYTES # BLD AUTO: 0.3 K/UL — SIGNIFICANT CHANGE UP (ref 0.1–0.6)
MONOCYTES NFR BLD AUTO: 9.3 % — SIGNIFICANT CHANGE UP (ref 1.7–9.3)
NEUTROPHILS # BLD AUTO: 2.21 K/UL — SIGNIFICANT CHANGE UP (ref 1.4–6.5)
NEUTROPHILS NFR BLD AUTO: 68.9 % — SIGNIFICANT CHANGE UP (ref 42.2–75.2)
NRBC # BLD: 0 /100 WBCS — SIGNIFICANT CHANGE UP (ref 0–0)
PLATELET # BLD AUTO: 91 K/UL — LOW (ref 130–400)
POTASSIUM SERPL-MCNC: 3.8 MMOL/L — SIGNIFICANT CHANGE UP (ref 3.5–5)
POTASSIUM SERPL-SCNC: 3.8 MMOL/L — SIGNIFICANT CHANGE UP (ref 3.5–5)
PROT SERPL-MCNC: 5.1 G/DL — LOW (ref 6–8)
RBC # BLD: 2.62 M/UL — LOW (ref 4.2–5.4)
RBC # FLD: 20.2 % — HIGH (ref 11.5–14.5)
SODIUM SERPL-SCNC: 141 MMOL/L — SIGNIFICANT CHANGE UP (ref 135–146)
WBC # BLD: 3.21 K/UL — LOW (ref 4.8–10.8)
WBC # FLD AUTO: 3.21 K/UL — LOW (ref 4.8–10.8)

## 2022-11-20 PROCEDURE — 71045 X-RAY EXAM CHEST 1 VIEW: CPT | Mod: 26

## 2022-11-20 PROCEDURE — 99232 SBSQ HOSP IP/OBS MODERATE 35: CPT

## 2022-11-20 RX ORDER — MAGNESIUM SULFATE 500 MG/ML
2 VIAL (ML) INJECTION ONCE
Refills: 0 | Status: COMPLETED | OUTPATIENT
Start: 2022-11-20 | End: 2022-11-20

## 2022-11-20 RX ADMIN — Medication 40 MILLIEQUIVALENT(S): at 12:36

## 2022-11-20 RX ADMIN — CEFEPIME 100 MILLIGRAM(S): 1 INJECTION, POWDER, FOR SOLUTION INTRAMUSCULAR; INTRAVENOUS at 15:21

## 2022-11-20 RX ADMIN — Medication 3 MILLILITER(S): at 20:27

## 2022-11-20 RX ADMIN — Medication 3 MILLILITER(S): at 07:56

## 2022-11-20 RX ADMIN — CEFEPIME 100 MILLIGRAM(S): 1 INJECTION, POWDER, FOR SOLUTION INTRAMUSCULAR; INTRAVENOUS at 21:34

## 2022-11-20 RX ADMIN — Medication 400 MILLIGRAM(S): at 11:52

## 2022-11-20 RX ADMIN — Medication 25 GRAM(S): at 12:49

## 2022-11-20 RX ADMIN — VORICONAZOLE 125 MILLIGRAM(S): 10 INJECTION, POWDER, LYOPHILIZED, FOR SOLUTION INTRAVENOUS at 19:30

## 2022-11-20 RX ADMIN — Medication 3 MILLILITER(S): at 02:46

## 2022-11-20 RX ADMIN — Medication 300 MILLIGRAM(S): at 11:51

## 2022-11-20 RX ADMIN — CEFEPIME 100 MILLIGRAM(S): 1 INJECTION, POWDER, FOR SOLUTION INTRAMUSCULAR; INTRAVENOUS at 06:06

## 2022-11-20 RX ADMIN — VORICONAZOLE 125 MILLIGRAM(S): 10 INJECTION, POWDER, LYOPHILIZED, FOR SOLUTION INTRAVENOUS at 06:06

## 2022-11-20 RX ADMIN — Medication 3 MILLILITER(S): at 13:29

## 2022-11-20 NOTE — PROGRESS NOTE ADULT - SUBJECTIVE AND OBJECTIVE BOX
76 y/o female with PMH of AML on chemo (follows with oncologist Dr. Marsh) last treatment was 2 weeks ago.  Pt was brought to hospital for weakness, dizziness, vomiting and fever TMax 104 at home. As per daughter, pt has been fatigued associated with malaise and vomiting (dry heaves) that started today. No cough, congestion, abdominal pain, diarrhea or dysuria. with wheeze.     Today:  Seen at bedside, no new complaints.          REVIEW OF SYSTEMS:  No new complaints      MEDICATIONS  (STANDING):  acetaminophen     Tablet .. 650 milliGRAM(s) Oral once  acyclovir   Oral Tab/Cap 400 milliGRAM(s) Oral daily  albuterol/ipratropium for Nebulization 3 milliLiter(s) Nebulizer every 6 hours  allopurinol 300 milliGRAM(s) Oral daily  cefepime   IVPB 2000 milliGRAM(s) IV Intermittent every 8 hours  enoxaparin Injectable 40 milliGRAM(s) SubCutaneous every 24 hours  voriconazole IVPB 388 milliGRAM(s) IV Intermittent two times a day    MEDICATIONS  (PRN):  acetaminophen     Tablet .. 650 milliGRAM(s) Oral every 6 hours PRN Temp greater or equal to 38C (100.4F), Mild Pain (1 - 3)  aluminum hydroxide/magnesium hydroxide/simethicone Suspension 30 milliLiter(s) Oral every 4 hours PRN Dyspepsia  ondansetron Injectable 4 milliGRAM(s) IV Push every 8 hours PRN Nausea and/or Vomiting      Allergies  No Known Allergies        FAMILY HISTORY:  FH: heart disease (Mother)        Vital Signs Last 24 Hrs  T(C): 36.3 (20 Nov 2022 04:41), Max: 37.2 (19 Nov 2022 14:21)  T(F): 97.4 (20 Nov 2022 04:41), Max: 99 (19 Nov 2022 14:21)  HR: 92 (20 Nov 2022 04:41) (90 - 94)  BP: 119/58 (20 Nov 2022 04:41) (119/58 - 130/60)  RR: 18 (20 Nov 2022 04:41) (18 - 18)  SpO2: 92% (20 Nov 2022 06:33) (92% - 92%)    Parameters below as of 20 Nov 2022 06:33  Patient On (Oxygen Delivery Method): room air        PHYSICAL EXAM:  GA :NAD   HEENT: PERRLA, EOMI  NECK: no JVD, no thyromegaly   CVS: S1 S2 no murmur no rubs no gallop  RESP:   no wheeze, no rhonchi, LLL  rales right chest port clean  ABD: Soft, NT, ND, tympanic, no rebound or guarding   : No Waldron, No CVA tenderness   EXT; no edema , no cyanosis  MSK: No ML spinal tenderness, normal ROM   NEURO: AAOX3, no new focal deficits      LABS:                        8.3    3.21  )-----------( 91       ( 20 Nov 2022 08:30 )             25.6     11-20    141  |  102  |  7<L>  ----------------------------<  136<H>  3.8   |  27  |  0.6<L>    Ca    8.1<L>      20 Nov 2022 08:30  Mg     1.7     11-20    TPro  5.1<L>  /  Alb  2.8<L>  /  TBili  0.4  /  DBili  x   /  AST  25  /  ALT  13  /  AlkPhos  94  11-20

## 2022-11-20 NOTE — PROGRESS NOTE ADULT - PROVIDER SPECIALTY LIST ADULT
Heme/Onc
Hospitalist
Heme/Onc
Hospitalist
Pulmonology
Infectious Disease

## 2022-11-20 NOTE — PROGRESS NOTE ADULT - ASSESSMENT
78 y/o female with PMH of AML on chemo (follows with oncologist Dr. Marsh) last treatment was 2 weeks ago.  Pt was brought to hospital for weakness, dizziness, vomiting and fever TMax 104 at home. As per daughter, pt has been fatigued associated with malaise and vomiting (dry heaves) that started today. No cough, congestion, abdominal pain, diarrhea or dysuria. with wheeze.     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  - ID request possible bronch for BAL - pulm consulted - states that bronch not warranted at this time - repeat CXR with worsening left sided opacity - remains - afebrile x >24 hrs now  - continue cefepime 2g q 8 hours   - continue voriconazole 4 mg/kg q 12 hours for now    - repeat CXR today  - MRSA swab neg,  RVP negative,  neutropenic precautions   - Oxygen is off - on RA now   - ID consult following , Hematology consult following    - ANC/ pancytopenia  gradually improving   - galactomannan normal    < from: CT Chest No Cont (11.18.22 @ 10:10) >  IMPRESSION:    Interval worsening of bilateral pleural effusions with a new   consolidative process in the left lung consistent with pneumonia.      < end of copied text >       AML (acute lymphocytic leukemia) s/p 2 units   ·  Plan: AML diagnosed approx 1 yr ago   - cont px with acyclovir and allopurinol   - Hem/Onc following   - Follow HG  - if Hg <7.0 consider transfusion  - T&S  - Monitor Vitals  - hold chemo for now    Pancytopenia - improving   - per hem/onc transfused 2 units of leukoreduced and irradiated prbcs  - will DC neupogen as neutropenia improved - discussed with dr sanchez     magnesium and potassium deficiency - monitor    Weakness  - can hhold iv fluids for now - 3 weeks post chemo - no concern for tumor lysis at this time,     DVT px  -Lovenox     Full code.  dc plan is home in possible 24-48 hours  follow up CXR electrolytes, ID follow up for antifungal/antibiotic plan   discussed plan with Dr Sharp and Dr Quezada - cont to monitor until early this week on iv abxs and iv antifungals - CT chest reveiwed, repeat CXR on SUNDAY - Pulm will decide early this week if bronch warranted if not improving   will need a few weeks worth of oral antibiotics / antifungals

## 2022-11-21 ENCOUNTER — TRANSCRIPTION ENCOUNTER (OUTPATIENT)
Age: 77
End: 2022-11-21

## 2022-11-21 VITALS
HEART RATE: 87 BPM | TEMPERATURE: 98 F | RESPIRATION RATE: 18 BRPM | SYSTOLIC BLOOD PRESSURE: 128 MMHG | DIASTOLIC BLOOD PRESSURE: 60 MMHG

## 2022-11-21 LAB
ALBUMIN SERPL ELPH-MCNC: 2.9 G/DL — LOW (ref 3.5–5.2)
ALP SERPL-CCNC: 89 U/L — SIGNIFICANT CHANGE UP (ref 30–115)
ALT FLD-CCNC: 11 U/L — SIGNIFICANT CHANGE UP (ref 0–41)
ANION GAP SERPL CALC-SCNC: 12 MMOL/L — SIGNIFICANT CHANGE UP (ref 7–14)
AST SERPL-CCNC: 19 U/L — SIGNIFICANT CHANGE UP (ref 0–41)
BASOPHILS # BLD AUTO: 0.01 K/UL — SIGNIFICANT CHANGE UP (ref 0–0.2)
BASOPHILS NFR BLD AUTO: 0.3 % — SIGNIFICANT CHANGE UP (ref 0–1)
BILIRUB SERPL-MCNC: 0.4 MG/DL — SIGNIFICANT CHANGE UP (ref 0.2–1.2)
BUN SERPL-MCNC: 10 MG/DL — SIGNIFICANT CHANGE UP (ref 10–20)
CALCIUM SERPL-MCNC: 8.1 MG/DL — LOW (ref 8.4–10.5)
CHLORIDE SERPL-SCNC: 102 MMOL/L — SIGNIFICANT CHANGE UP (ref 98–110)
CO2 SERPL-SCNC: 26 MMOL/L — SIGNIFICANT CHANGE UP (ref 17–32)
CREAT SERPL-MCNC: 0.6 MG/DL — LOW (ref 0.7–1.5)
EGFR: 94 ML/MIN/1.73M2 — SIGNIFICANT CHANGE UP
EOSINOPHIL # BLD AUTO: 0.03 K/UL — SIGNIFICANT CHANGE UP (ref 0–0.7)
EOSINOPHIL NFR BLD AUTO: 0.9 % — SIGNIFICANT CHANGE UP (ref 0–8)
GLUCOSE SERPL-MCNC: 109 MG/DL — HIGH (ref 70–99)
HCT VFR BLD CALC: 25.4 % — LOW (ref 37–47)
HGB BLD-MCNC: 8.2 G/DL — LOW (ref 12–16)
IMM GRANULOCYTES NFR BLD AUTO: 1.8 % — HIGH (ref 0.1–0.3)
LYMPHOCYTES # BLD AUTO: 0.58 K/UL — LOW (ref 1.2–3.4)
LYMPHOCYTES # BLD AUTO: 17.8 % — LOW (ref 20.5–51.1)
MCHC RBC-ENTMCNC: 31.5 PG — HIGH (ref 27–31)
MCHC RBC-ENTMCNC: 32.3 G/DL — SIGNIFICANT CHANGE UP (ref 32–37)
MCV RBC AUTO: 97.7 FL — SIGNIFICANT CHANGE UP (ref 81–99)
MONOCYTES # BLD AUTO: 0.33 K/UL — SIGNIFICANT CHANGE UP (ref 0.1–0.6)
MONOCYTES NFR BLD AUTO: 10.2 % — HIGH (ref 1.7–9.3)
NEUTROPHILS # BLD AUTO: 2.24 K/UL — SIGNIFICANT CHANGE UP (ref 1.4–6.5)
NEUTROPHILS NFR BLD AUTO: 69 % — SIGNIFICANT CHANGE UP (ref 42.2–75.2)
NRBC # BLD: 0 /100 WBCS — SIGNIFICANT CHANGE UP (ref 0–0)
PLATELET # BLD AUTO: 80 K/UL — LOW (ref 130–400)
POTASSIUM SERPL-MCNC: 3.8 MMOL/L — SIGNIFICANT CHANGE UP (ref 3.5–5)
POTASSIUM SERPL-SCNC: 3.8 MMOL/L — SIGNIFICANT CHANGE UP (ref 3.5–5)
PROT SERPL-MCNC: 5.3 G/DL — LOW (ref 6–8)
RBC # BLD: 2.6 M/UL — LOW (ref 4.2–5.4)
RBC # FLD: 19.5 % — HIGH (ref 11.5–14.5)
SODIUM SERPL-SCNC: 140 MMOL/L — SIGNIFICANT CHANGE UP (ref 135–146)
VORICONAZOLE SERPL-MCNC: >10 MCG/ML — HIGH (ref 1–5.5)
WBC # BLD: 3.25 K/UL — LOW (ref 4.8–10.8)
WBC # FLD AUTO: 3.25 K/UL — LOW (ref 4.8–10.8)

## 2022-11-21 PROCEDURE — 99239 HOSP IP/OBS DSCHRG MGMT >30: CPT

## 2022-11-21 PROCEDURE — 93010 ELECTROCARDIOGRAM REPORT: CPT

## 2022-11-21 RX ORDER — LEVOFLOXACIN 5 MG/ML
1 INJECTION, SOLUTION INTRAVENOUS
Qty: 3 | Refills: 0
Start: 2022-11-21 | End: 2022-11-23

## 2022-11-21 RX ADMIN — VORICONAZOLE 125 MILLIGRAM(S): 10 INJECTION, POWDER, LYOPHILIZED, FOR SOLUTION INTRAVENOUS at 06:05

## 2022-11-21 RX ADMIN — Medication 3 MILLILITER(S): at 08:01

## 2022-11-21 RX ADMIN — Medication 3 MILLILITER(S): at 02:16

## 2022-11-21 RX ADMIN — Medication 3 MILLILITER(S): at 14:05

## 2022-11-21 RX ADMIN — CEFEPIME 100 MILLIGRAM(S): 1 INJECTION, POWDER, FOR SOLUTION INTRAMUSCULAR; INTRAVENOUS at 06:05

## 2022-11-21 RX ADMIN — Medication 300 MILLIGRAM(S): at 11:25

## 2022-11-21 RX ADMIN — Medication 400 MILLIGRAM(S): at 11:24

## 2022-11-21 NOTE — DISCHARGE NOTE NURSING/CASE MANAGEMENT/SOCIAL WORK - NSDCPEFALRISK_GEN_ALL_CORE
For information on Fall & Injury Prevention, visit: [email or web address] OR  [email or web address] OR  https://www.cdc.gov/steadi/patient.html

## 2022-11-21 NOTE — DISCHARGE NOTE NURSING/CASE MANAGEMENT/SOCIAL WORK - PATIENT PORTAL LINK FT
You can access the FollowMyHealth Patient Portal offered by [facility] by registering at the following website: [email or web address]. By joining [employer]’s FollowMyHealth portal, you will also be able to view your health information using other applications (apps) compatible with our system.

## 2022-11-25 DIAGNOSIS — E66.9 OBESITY, UNSPECIFIED: ICD-10-CM

## 2022-11-25 DIAGNOSIS — J18.9 PNEUMONIA, UNSPECIFIED ORGANISM: ICD-10-CM

## 2022-11-25 DIAGNOSIS — D61.810 ANTINEOPLASTIC CHEMOTHERAPY INDUCED PANCYTOPENIA: ICD-10-CM

## 2022-11-25 DIAGNOSIS — Z92.21 PERSONAL HISTORY OF ANTINEOPLASTIC CHEMOTHERAPY: ICD-10-CM

## 2022-11-25 DIAGNOSIS — E87.29 OTHER ACIDOSIS: ICD-10-CM

## 2022-11-25 DIAGNOSIS — D70.9 NEUTROPENIA, UNSPECIFIED: ICD-10-CM

## 2022-11-25 DIAGNOSIS — A41.89 OTHER SPECIFIED SEPSIS: ICD-10-CM

## 2022-11-25 DIAGNOSIS — Z20.822 CONTACT WITH AND (SUSPECTED) EXPOSURE TO COVID-19: ICD-10-CM

## 2022-11-25 DIAGNOSIS — E83.42 HYPOMAGNESEMIA: ICD-10-CM

## 2022-11-25 DIAGNOSIS — C92.00 ACUTE MYELOBLASTIC LEUKEMIA, NOT HAVING ACHIEVED REMISSION: ICD-10-CM

## 2022-11-25 DIAGNOSIS — R16.2 HEPATOMEGALY WITH SPLENOMEGALY, NOT ELSEWHERE CLASSIFIED: ICD-10-CM

## 2022-11-25 DIAGNOSIS — E87.6 HYPOKALEMIA: ICD-10-CM

## 2022-12-16 ENCOUNTER — TRANSCRIPTION ENCOUNTER (OUTPATIENT)
Age: 77
End: 2022-12-16

## 2022-12-20 ENCOUNTER — TRANSCRIPTION ENCOUNTER (OUTPATIENT)
Age: 77
End: 2022-12-20

## 2024-01-01 ENCOUNTER — INPATIENT (INPATIENT)
Facility: HOSPITAL | Age: 79
LOS: 3 days | DRG: 871 | End: 2024-02-12
Attending: INTERNAL MEDICINE | Admitting: FAMILY MEDICINE
Payer: MEDICARE

## 2024-01-01 ENCOUNTER — RESULT REVIEW (OUTPATIENT)
Age: 79
End: 2024-01-01

## 2024-01-01 VITALS
RESPIRATION RATE: 24 BRPM | OXYGEN SATURATION: 94 % | DIASTOLIC BLOOD PRESSURE: 56 MMHG | WEIGHT: 162.04 LBS | TEMPERATURE: 103 F | HEART RATE: 87 BPM | SYSTOLIC BLOOD PRESSURE: 121 MMHG | HEIGHT: 62 IN

## 2024-01-01 VITALS — RESPIRATION RATE: 9 BRPM | OXYGEN SATURATION: 72 %

## 2024-01-01 DIAGNOSIS — D70.9 NEUTROPENIA, UNSPECIFIED: ICD-10-CM

## 2024-01-01 DIAGNOSIS — Z98.890 OTHER SPECIFIED POSTPROCEDURAL STATES: Chronic | ICD-10-CM

## 2024-01-01 LAB
-  AZTREONAM: SIGNIFICANT CHANGE UP
-  CEFEPIME: SIGNIFICANT CHANGE UP
-  CEFTAZIDIME: SIGNIFICANT CHANGE UP
-  CIPROFLOXACIN: SIGNIFICANT CHANGE UP
-  IMIPENEM: SIGNIFICANT CHANGE UP
-  LEVOFLOXACIN: SIGNIFICANT CHANGE UP
-  MEROPENEM: SIGNIFICANT CHANGE UP
-  PIPERACILLIN/TAZOBACTAM: SIGNIFICANT CHANGE UP
ALBUMIN SERPL ELPH-MCNC: 2.3 G/DL — LOW (ref 3.5–5.2)
ALBUMIN SERPL ELPH-MCNC: 2.3 G/DL — LOW (ref 3.5–5.2)
ALBUMIN SERPL ELPH-MCNC: 2.4 G/DL — LOW (ref 3.5–5.2)
ALBUMIN SERPL ELPH-MCNC: 2.7 G/DL — LOW (ref 3.5–5.2)
ALBUMIN SERPL ELPH-MCNC: 2.8 G/DL — LOW (ref 3.5–5.2)
ALBUMIN SERPL ELPH-MCNC: 3.3 G/DL — LOW (ref 3.5–5.2)
ALP SERPL-CCNC: 44 U/L — SIGNIFICANT CHANGE UP (ref 30–115)
ALP SERPL-CCNC: 44 U/L — SIGNIFICANT CHANGE UP (ref 30–115)
ALP SERPL-CCNC: 49 U/L — SIGNIFICANT CHANGE UP (ref 30–115)
ALP SERPL-CCNC: 49 U/L — SIGNIFICANT CHANGE UP (ref 30–115)
ALP SERPL-CCNC: 51 U/L — SIGNIFICANT CHANGE UP (ref 30–115)
ALP SERPL-CCNC: 51 U/L — SIGNIFICANT CHANGE UP (ref 30–115)
ALP SERPL-CCNC: 52 U/L — SIGNIFICANT CHANGE UP (ref 30–115)
ALP SERPL-CCNC: 66 U/L — SIGNIFICANT CHANGE UP (ref 30–115)
ALT FLD-CCNC: 1093 U/L — HIGH (ref 0–41)
ALT FLD-CCNC: 12 U/L — SIGNIFICANT CHANGE UP (ref 0–41)
ALT FLD-CCNC: 265 U/L — HIGH (ref 0–41)
ALT FLD-CCNC: 380 U/L — HIGH (ref 0–41)
ALT FLD-CCNC: 501 U/L — HIGH (ref 0–41)
ALT FLD-CCNC: 54 U/L — HIGH (ref 0–41)
ALT FLD-CCNC: 666 U/L — HIGH (ref 0–41)
ALT FLD-CCNC: >700 U/L — HIGH (ref 0–41)
ANION GAP SERPL CALC-SCNC: 10 MMOL/L — SIGNIFICANT CHANGE UP (ref 7–14)
ANION GAP SERPL CALC-SCNC: 10 MMOL/L — SIGNIFICANT CHANGE UP (ref 7–14)
ANION GAP SERPL CALC-SCNC: 11 MMOL/L — SIGNIFICANT CHANGE UP (ref 7–14)
ANION GAP SERPL CALC-SCNC: 11 MMOL/L — SIGNIFICANT CHANGE UP (ref 7–14)
ANION GAP SERPL CALC-SCNC: 12 MMOL/L — SIGNIFICANT CHANGE UP (ref 7–14)
ANION GAP SERPL CALC-SCNC: 13 MMOL/L — SIGNIFICANT CHANGE UP (ref 7–14)
ANION GAP SERPL CALC-SCNC: 15 MMOL/L — HIGH (ref 7–14)
ANION GAP SERPL CALC-SCNC: 15 MMOL/L — HIGH (ref 7–14)
ANION GAP SERPL CALC-SCNC: 16 MMOL/L — HIGH (ref 7–14)
ANION GAP SERPL CALC-SCNC: 16 MMOL/L — HIGH (ref 7–14)
ANION GAP SERPL CALC-SCNC: 18 MMOL/L — HIGH (ref 7–14)
ANION GAP SERPL CALC-SCNC: 29 MMOL/L — HIGH (ref 7–14)
ANION GAP SERPL CALC-SCNC: 30 MMOL/L — HIGH (ref 7–14)
ANION GAP SERPL CALC-SCNC: 8 MMOL/L — SIGNIFICANT CHANGE UP (ref 7–14)
ANISOCYTOSIS BLD QL: SLIGHT — SIGNIFICANT CHANGE UP
APPEARANCE UR: ABNORMAL
APPEARANCE UR: ABNORMAL
APPEARANCE UR: CLEAR — SIGNIFICANT CHANGE UP
APTT BLD: 38.3 SEC — SIGNIFICANT CHANGE UP (ref 27–39.2)
APTT BLD: 43.3 SEC — HIGH (ref 27–39.2)
AST SERPL-CCNC: 119 U/L — HIGH (ref 0–41)
AST SERPL-CCNC: 1505 U/L — HIGH (ref 0–41)
AST SERPL-CCNC: 156 U/L — HIGH (ref 0–41)
AST SERPL-CCNC: 352 U/L — HIGH (ref 0–41)
AST SERPL-CCNC: 56 U/L — HIGH (ref 0–41)
AST SERPL-CCNC: 73 U/L — HIGH (ref 0–41)
AST SERPL-CCNC: 9 U/L — SIGNIFICANT CHANGE UP (ref 0–41)
AST SERPL-CCNC: >700 U/L — HIGH (ref 0–41)
BACTERIA # UR AUTO: ABNORMAL /HPF
BASE EXCESS BLDV CALC-SCNC: -0.8 MMOL/L — SIGNIFICANT CHANGE UP (ref -2–3)
BASOPHILS # BLD AUTO: 0 K/UL — SIGNIFICANT CHANGE UP (ref 0–0.2)
BASOPHILS # BLD AUTO: 0 K/UL — SIGNIFICANT CHANGE UP (ref 0–0.2)
BASOPHILS # BLD AUTO: 0.01 K/UL — SIGNIFICANT CHANGE UP (ref 0–0.2)
BASOPHILS NFR BLD AUTO: 0 % — SIGNIFICANT CHANGE UP (ref 0–1)
BASOPHILS NFR BLD AUTO: 0 % — SIGNIFICANT CHANGE UP (ref 0–1)
BASOPHILS NFR BLD AUTO: 3.1 % — HIGH (ref 0–1)
BASOPHILS NFR BLD AUTO: 4 % — HIGH (ref 0–1)
BASOPHILS NFR BLD AUTO: 4.2 % — HIGH (ref 0–1)
BILIRUB SERPL-MCNC: 2.2 MG/DL — HIGH (ref 0.2–1.2)
BILIRUB SERPL-MCNC: 6.4 MG/DL — HIGH (ref 0.2–1.2)
BILIRUB SERPL-MCNC: 6.6 MG/DL — HIGH (ref 0.2–1.2)
BILIRUB SERPL-MCNC: 7 MG/DL — HIGH (ref 0.2–1.2)
BILIRUB SERPL-MCNC: 7.1 MG/DL — HIGH (ref 0.2–1.2)
BILIRUB SERPL-MCNC: 8.3 MG/DL — HIGH (ref 0.2–1.2)
BILIRUB SERPL-MCNC: 8.3 MG/DL — HIGH (ref 0.2–1.2)
BILIRUB SERPL-MCNC: 9.5 MG/DL — HIGH (ref 0.2–1.2)
BILIRUB UR-MCNC: ABNORMAL
BILIRUB UR-MCNC: ABNORMAL
BILIRUB UR-MCNC: NEGATIVE — SIGNIFICANT CHANGE UP
BLD GP AB SCN SERPL QL: SIGNIFICANT CHANGE UP
BLD GP AB SCN SERPL QL: SIGNIFICANT CHANGE UP
BUN SERPL-MCNC: 20 MG/DL — SIGNIFICANT CHANGE UP (ref 10–20)
BUN SERPL-MCNC: 21 MG/DL — HIGH (ref 10–20)
BUN SERPL-MCNC: 22 MG/DL — HIGH (ref 10–20)
BUN SERPL-MCNC: 24 MG/DL — HIGH (ref 10–20)
BUN SERPL-MCNC: 24 MG/DL — HIGH (ref 10–20)
BUN SERPL-MCNC: 25 MG/DL — HIGH (ref 10–20)
BUN SERPL-MCNC: 27 MG/DL — HIGH (ref 10–20)
BUN SERPL-MCNC: 28 MG/DL — HIGH (ref 10–20)
BUN SERPL-MCNC: 28 MG/DL — HIGH (ref 10–20)
BUN SERPL-MCNC: 30 MG/DL — HIGH (ref 10–20)
BUN SERPL-MCNC: 30 MG/DL — HIGH (ref 10–20)
BUN SERPL-MCNC: 31 MG/DL — HIGH (ref 10–20)
BURR CELLS BLD QL SMEAR: PRESENT — SIGNIFICANT CHANGE UP
BURR CELLS BLD QL SMEAR: PRESENT — SIGNIFICANT CHANGE UP
CA-I SERPL-SCNC: 1.13 MMOL/L — LOW (ref 1.15–1.33)
CALCIUM SERPL-MCNC: 6.9 MG/DL — LOW (ref 8.4–10.5)
CALCIUM SERPL-MCNC: 7.1 MG/DL — LOW (ref 8.4–10.5)
CALCIUM SERPL-MCNC: 7.5 MG/DL — LOW (ref 8.4–10.5)
CALCIUM SERPL-MCNC: 7.5 MG/DL — LOW (ref 8.4–10.5)
CALCIUM SERPL-MCNC: 7.6 MG/DL — LOW (ref 8.4–10.5)
CALCIUM SERPL-MCNC: 7.8 MG/DL — LOW (ref 8.4–10.5)
CALCIUM SERPL-MCNC: 7.9 MG/DL — LOW (ref 8.4–10.5)
CALCIUM SERPL-MCNC: 7.9 MG/DL — LOW (ref 8.4–10.5)
CALCIUM SERPL-MCNC: 8 MG/DL — LOW (ref 8.4–10.5)
CALCIUM SERPL-MCNC: 8.4 MG/DL — SIGNIFICANT CHANGE UP (ref 8.4–10.5)
CHLORIDE SERPL-SCNC: 100 MMOL/L — SIGNIFICANT CHANGE UP (ref 98–110)
CHLORIDE SERPL-SCNC: 101 MMOL/L — SIGNIFICANT CHANGE UP (ref 98–110)
CHLORIDE SERPL-SCNC: 102 MMOL/L — SIGNIFICANT CHANGE UP (ref 98–110)
CHLORIDE SERPL-SCNC: 103 MMOL/L — SIGNIFICANT CHANGE UP (ref 98–110)
CHLORIDE SERPL-SCNC: 103 MMOL/L — SIGNIFICANT CHANGE UP (ref 98–110)
CHLORIDE SERPL-SCNC: 105 MMOL/L — SIGNIFICANT CHANGE UP (ref 98–110)
CHLORIDE SERPL-SCNC: 106 MMOL/L — SIGNIFICANT CHANGE UP (ref 98–110)
CHLORIDE SERPL-SCNC: 112 MMOL/L — HIGH (ref 98–110)
CHLORIDE SERPL-SCNC: 95 MMOL/L — LOW (ref 98–110)
CHLORIDE SERPL-SCNC: 95 MMOL/L — LOW (ref 98–110)
CHLORIDE SERPL-SCNC: 97 MMOL/L — LOW (ref 98–110)
CHLORIDE SERPL-SCNC: 98 MMOL/L — SIGNIFICANT CHANGE UP (ref 98–110)
CHLORIDE SERPL-SCNC: 99 MMOL/L — SIGNIFICANT CHANGE UP (ref 98–110)
CHLORIDE SERPL-SCNC: 99 MMOL/L — SIGNIFICANT CHANGE UP (ref 98–110)
CO2 SERPL-SCNC: 10 MMOL/L — LOW (ref 17–32)
CO2 SERPL-SCNC: 19 MMOL/L — SIGNIFICANT CHANGE UP (ref 17–32)
CO2 SERPL-SCNC: 23 MMOL/L — SIGNIFICANT CHANGE UP (ref 17–32)
CO2 SERPL-SCNC: 24 MMOL/L — SIGNIFICANT CHANGE UP (ref 17–32)
CO2 SERPL-SCNC: 28 MMOL/L — SIGNIFICANT CHANGE UP (ref 17–32)
CO2 SERPL-SCNC: 28 MMOL/L — SIGNIFICANT CHANGE UP (ref 17–32)
CO2 SERPL-SCNC: 29 MMOL/L — SIGNIFICANT CHANGE UP (ref 17–32)
CO2 SERPL-SCNC: 30 MMOL/L — SIGNIFICANT CHANGE UP (ref 17–32)
CO2 SERPL-SCNC: 31 MMOL/L — SIGNIFICANT CHANGE UP (ref 17–32)
CO2 SERPL-SCNC: 32 MMOL/L — SIGNIFICANT CHANGE UP (ref 17–32)
CO2 SERPL-SCNC: 32 MMOL/L — SIGNIFICANT CHANGE UP (ref 17–32)
CO2 SERPL-SCNC: 34 MMOL/L — HIGH (ref 17–32)
CO2 SERPL-SCNC: 34 MMOL/L — HIGH (ref 17–32)
CO2 SERPL-SCNC: 7 MMOL/L — CRITICAL LOW (ref 17–32)
COLOR SPEC: SIGNIFICANT CHANGE UP
COLOR SPEC: SIGNIFICANT CHANGE UP
COLOR SPEC: YELLOW — SIGNIFICANT CHANGE UP
CREAT ?TM UR-MCNC: 17 MG/DL — SIGNIFICANT CHANGE UP
CREAT ?TM UR-MCNC: 6 MG/DL — SIGNIFICANT CHANGE UP
CREAT SERPL-MCNC: 0.5 MG/DL — LOW (ref 0.7–1.5)
CREAT SERPL-MCNC: 0.6 MG/DL — LOW (ref 0.7–1.5)
CREAT SERPL-MCNC: 0.7 MG/DL — SIGNIFICANT CHANGE UP (ref 0.7–1.5)
CREAT SERPL-MCNC: 0.7 MG/DL — SIGNIFICANT CHANGE UP (ref 0.7–1.5)
CREAT SERPL-MCNC: 0.8 MG/DL — SIGNIFICANT CHANGE UP (ref 0.7–1.5)
CREAT SERPL-MCNC: 0.8 MG/DL — SIGNIFICANT CHANGE UP (ref 0.7–1.5)
CREAT SERPL-MCNC: 1 MG/DL — SIGNIFICANT CHANGE UP (ref 0.7–1.5)
CREAT SERPL-MCNC: 1.3 MG/DL — SIGNIFICANT CHANGE UP (ref 0.7–1.5)
CREAT SERPL-MCNC: 1.5 MG/DL — SIGNIFICANT CHANGE UP (ref 0.7–1.5)
CREAT SERPL-MCNC: 1.8 MG/DL — HIGH (ref 0.7–1.5)
CRYPTOC AG FLD QL: NEGATIVE — SIGNIFICANT CHANGE UP
CULTURE RESULTS: ABNORMAL
CULTURE RESULTS: ABNORMAL
CULTURE RESULTS: SIGNIFICANT CHANGE UP
DIFF PNL FLD: ABNORMAL
DIFF PNL FLD: ABNORMAL
DIFF PNL FLD: NEGATIVE — SIGNIFICANT CHANGE UP
EGFR: 29 ML/MIN/1.73M2 — LOW
EGFR: 36 ML/MIN/1.73M2 — LOW
EGFR: 43 ML/MIN/1.73M2 — LOW
EGFR: 58 ML/MIN/1.73M2 — LOW
EGFR: 76 ML/MIN/1.73M2 — SIGNIFICANT CHANGE UP
EGFR: 76 ML/MIN/1.73M2 — SIGNIFICANT CHANGE UP
EGFR: 90 ML/MIN/1.73M2 — SIGNIFICANT CHANGE UP
EGFR: 90 ML/MIN/1.73M2 — SIGNIFICANT CHANGE UP
EGFR: 93 ML/MIN/1.73M2 — SIGNIFICANT CHANGE UP
EGFR: 97 ML/MIN/1.73M2 — SIGNIFICANT CHANGE UP
EOSINOPHIL # BLD AUTO: 0 K/UL — SIGNIFICANT CHANGE UP (ref 0–0.7)
EOSINOPHIL # BLD AUTO: 0 K/UL — SIGNIFICANT CHANGE UP (ref 0–0.7)
EOSINOPHIL # BLD AUTO: 0.01 K/UL — SIGNIFICANT CHANGE UP (ref 0–0.7)
EOSINOPHIL NFR BLD AUTO: 0 % — SIGNIFICANT CHANGE UP (ref 0–8)
EOSINOPHIL NFR BLD AUTO: 4 % — SIGNIFICANT CHANGE UP (ref 0–8)
EPI CELLS # UR: PRESENT
EPI CELLS # UR: PRESENT
EPI CELLS # UR: SIGNIFICANT CHANGE UP
FLUAV AG NPH QL: SIGNIFICANT CHANGE UP
FLUBV AG NPH QL: SIGNIFICANT CHANGE UP
GAS PNL BLDA: SIGNIFICANT CHANGE UP
GAS PNL BLDV: 131 MMOL/L — LOW (ref 136–145)
GAS PNL BLDV: SIGNIFICANT CHANGE UP
GAS PNL BLDV: SIGNIFICANT CHANGE UP
GLUCOSE SERPL-MCNC: 131 MG/DL — HIGH (ref 70–99)
GLUCOSE SERPL-MCNC: 132 MG/DL — HIGH (ref 70–99)
GLUCOSE SERPL-MCNC: 137 MG/DL — HIGH (ref 70–99)
GLUCOSE SERPL-MCNC: 140 MG/DL — HIGH (ref 70–99)
GLUCOSE SERPL-MCNC: 141 MG/DL — HIGH (ref 70–99)
GLUCOSE SERPL-MCNC: 149 MG/DL — HIGH (ref 70–99)
GLUCOSE SERPL-MCNC: 157 MG/DL — HIGH (ref 70–99)
GLUCOSE SERPL-MCNC: 161 MG/DL — HIGH (ref 70–99)
GLUCOSE SERPL-MCNC: 164 MG/DL — HIGH (ref 70–99)
GLUCOSE SERPL-MCNC: 175 MG/DL — HIGH (ref 70–99)
GLUCOSE SERPL-MCNC: 177 MG/DL — HIGH (ref 70–99)
GLUCOSE SERPL-MCNC: 180 MG/DL — HIGH (ref 70–99)
GLUCOSE SERPL-MCNC: 193 MG/DL — HIGH (ref 70–99)
GLUCOSE SERPL-MCNC: 245 MG/DL — HIGH (ref 70–99)
GLUCOSE UR QL: NEGATIVE MG/DL — SIGNIFICANT CHANGE UP
GRAM STN FLD: ABNORMAL
HAPTOGLOB SERPL-MCNC: 227 MG/DL — HIGH (ref 34–200)
HCO3 BLDV-SCNC: 23 MMOL/L — SIGNIFICANT CHANGE UP (ref 22–29)
HCT VFR BLD CALC: 20.6 % — LOW (ref 37–47)
HCT VFR BLD CALC: 23.6 % — LOW (ref 37–47)
HCT VFR BLD CALC: 23.6 % — LOW (ref 37–47)
HCT VFR BLD CALC: 24.2 % — LOW (ref 37–47)
HCT VFR BLD CALC: 25.1 % — LOW (ref 37–47)
HCT VFR BLD CALC: 26.1 % — LOW (ref 37–47)
HCT VFR BLDA CALC: 27 % — LOW (ref 34.5–46.5)
HGB BLD CALC-MCNC: 9 G/DL — LOW (ref 11.7–16.1)
HGB BLD-MCNC: 6.8 G/DL — CRITICAL LOW (ref 12–16)
HGB BLD-MCNC: 7.6 G/DL — LOW (ref 12–16)
HGB BLD-MCNC: 7.7 G/DL — LOW (ref 12–16)
HGB BLD-MCNC: 8 G/DL — LOW (ref 12–16)
HGB BLD-MCNC: 8.4 G/DL — LOW (ref 12–16)
HGB BLD-MCNC: 8.8 G/DL — LOW (ref 12–16)
HYPOCHROMIA BLD QL: SLIGHT — SIGNIFICANT CHANGE UP
IMM GRANULOCYTES NFR BLD AUTO: 0 % — LOW (ref 0.1–0.3)
IMM GRANULOCYTES NFR BLD AUTO: 3.1 % — HIGH (ref 0.1–0.3)
INR BLD: 1.58 RATIO — HIGH (ref 0.65–1.3)
INR BLD: 1.77 RATIO — HIGH (ref 0.65–1.3)
KETONES UR-MCNC: ABNORMAL MG/DL
KETONES UR-MCNC: NEGATIVE MG/DL — SIGNIFICANT CHANGE UP
KETONES UR-MCNC: NEGATIVE MG/DL — SIGNIFICANT CHANGE UP
LACTATE BLDV-MCNC: 2.1 MMOL/L — HIGH (ref 0.5–2)
LACTATE SERPL-SCNC: 1.8 MMOL/L — SIGNIFICANT CHANGE UP (ref 0.7–2)
LACTATE SERPL-SCNC: 17.2 MMOL/L — CRITICAL HIGH (ref 0.7–2)
LACTATE SERPL-SCNC: 17.9 MMOL/L — CRITICAL HIGH (ref 0.7–2)
LACTATE SERPL-SCNC: 3.3 MMOL/L — HIGH (ref 0.7–2)
LACTATE SERPL-SCNC: 3.8 MMOL/L — HIGH (ref 0.7–2)
LACTATE SERPL-SCNC: 4.8 MMOL/L — CRITICAL HIGH (ref 0.7–2)
LACTATE SERPL-SCNC: 5.7 MMOL/L — CRITICAL HIGH (ref 0.7–2)
LACTATE SERPL-SCNC: 6 MMOL/L — CRITICAL HIGH (ref 0.7–2)
LACTATE SERPL-SCNC: 9.6 MMOL/L — CRITICAL HIGH (ref 0.7–2)
LDH SERPL L TO P-CCNC: 361 — HIGH (ref 50–242)
LEUKOCYTE ESTERASE UR-ACNC: NEGATIVE — SIGNIFICANT CHANGE UP
LG PLATELETS BLD QL AUTO: SLIGHT — SIGNIFICANT CHANGE UP
LYMPHOCYTES # BLD AUTO: 0.07 K/UL — LOW (ref 1.2–3.4)
LYMPHOCYTES # BLD AUTO: 0.13 K/UL — LOW (ref 1.2–3.4)
LYMPHOCYTES # BLD AUTO: 0.14 K/UL — LOW (ref 1.2–3.4)
LYMPHOCYTES # BLD AUTO: 0.16 K/UL — LOW (ref 1.2–3.4)
LYMPHOCYTES # BLD AUTO: 0.16 K/UL — LOW (ref 1.2–3.4)
LYMPHOCYTES # BLD AUTO: 40 % — SIGNIFICANT CHANGE UP (ref 20.5–51.1)
LYMPHOCYTES # BLD AUTO: 43.8 % — SIGNIFICANT CHANGE UP (ref 20.5–51.1)
LYMPHOCYTES # BLD AUTO: 54.2 % — HIGH (ref 20.5–51.1)
LYMPHOCYTES # BLD AUTO: 84 % — HIGH (ref 20.5–51.1)
LYMPHOCYTES # BLD AUTO: 84 % — HIGH (ref 20.5–51.1)
MACROCYTES BLD QL: SLIGHT — SIGNIFICANT CHANGE UP
MAGNESIUM SERPL-MCNC: 1.5 MG/DL — LOW (ref 1.8–2.4)
MAGNESIUM SERPL-MCNC: 1.7 MG/DL — LOW (ref 1.8–2.4)
MAGNESIUM SERPL-MCNC: 1.7 MG/DL — LOW (ref 1.8–2.4)
MAGNESIUM SERPL-MCNC: 1.9 MG/DL — SIGNIFICANT CHANGE UP (ref 1.8–2.4)
MAGNESIUM SERPL-MCNC: 1.9 MG/DL — SIGNIFICANT CHANGE UP (ref 1.8–2.4)
MAGNESIUM SERPL-MCNC: 2 MG/DL — SIGNIFICANT CHANGE UP (ref 1.8–2.4)
MAGNESIUM SERPL-MCNC: 2.4 MG/DL — SIGNIFICANT CHANGE UP (ref 1.8–2.4)
MAGNESIUM SERPL-MCNC: 2.9 MG/DL — HIGH (ref 1.8–2.4)
MAGNESIUM SERPL-MCNC: 3.9 MG/DL — CRITICAL HIGH (ref 1.8–2.4)
MANUAL SMEAR VERIFICATION: SIGNIFICANT CHANGE UP
MCHC RBC-ENTMCNC: 30.3 G/DL — LOW (ref 32–37)
MCHC RBC-ENTMCNC: 30.3 PG — SIGNIFICANT CHANGE UP (ref 27–31)
MCHC RBC-ENTMCNC: 30.4 PG — SIGNIFICANT CHANGE UP (ref 27–31)
MCHC RBC-ENTMCNC: 30.8 PG — SIGNIFICANT CHANGE UP (ref 27–31)
MCHC RBC-ENTMCNC: 31 PG — SIGNIFICANT CHANGE UP (ref 27–31)
MCHC RBC-ENTMCNC: 31.1 PG — HIGH (ref 27–31)
MCHC RBC-ENTMCNC: 32.2 PG — HIGH (ref 27–31)
MCHC RBC-ENTMCNC: 32.6 G/DL — SIGNIFICANT CHANGE UP (ref 32–37)
MCHC RBC-ENTMCNC: 33 G/DL — SIGNIFICANT CHANGE UP (ref 32–37)
MCHC RBC-ENTMCNC: 33.1 G/DL — SIGNIFICANT CHANGE UP (ref 32–37)
MCHC RBC-ENTMCNC: 33.7 G/DL — SIGNIFICANT CHANGE UP (ref 32–37)
MCHC RBC-ENTMCNC: 35.6 G/DL — SIGNIFICANT CHANGE UP (ref 32–37)
MCV RBC AUTO: 102.9 FL — HIGH (ref 81–99)
MCV RBC AUTO: 87.1 FL — SIGNIFICANT CHANGE UP (ref 81–99)
MCV RBC AUTO: 90.3 FL — SIGNIFICANT CHANGE UP (ref 81–99)
MCV RBC AUTO: 92.9 FL — SIGNIFICANT CHANGE UP (ref 81–99)
MCV RBC AUTO: 93.1 FL — SIGNIFICANT CHANGE UP (ref 81–99)
MCV RBC AUTO: 97.6 FL — SIGNIFICANT CHANGE UP (ref 81–99)
METHOD TYPE: SIGNIFICANT CHANGE UP
METHOD TYPE: SIGNIFICANT CHANGE UP
MICROCYTES BLD QL: SLIGHT — SIGNIFICANT CHANGE UP
MONOCYTES # BLD AUTO: 0 K/UL — LOW (ref 0.1–0.6)
MONOCYTES # BLD AUTO: 0.01 K/UL — LOW (ref 0.1–0.6)
MONOCYTES # BLD AUTO: 0.01 K/UL — LOW (ref 0.1–0.6)
MONOCYTES # BLD AUTO: 0.02 K/UL — LOW (ref 0.1–0.6)
MONOCYTES # BLD AUTO: 0.03 K/UL — LOW (ref 0.1–0.6)
MONOCYTES NFR BLD AUTO: 0 % — LOW (ref 1.7–9.3)
MONOCYTES NFR BLD AUTO: 4 % — SIGNIFICANT CHANGE UP (ref 1.7–9.3)
MONOCYTES NFR BLD AUTO: 4.2 % — SIGNIFICANT CHANGE UP (ref 1.7–9.3)
MONOCYTES NFR BLD AUTO: 6.3 % — SIGNIFICANT CHANGE UP (ref 1.7–9.3)
MONOCYTES NFR BLD AUTO: 8 % — SIGNIFICANT CHANGE UP (ref 1.7–9.3)
MRSA PCR RESULT.: NEGATIVE — SIGNIFICANT CHANGE UP
MUCOUS THREADS # UR AUTO: PRESENT
NEUTROPHILS # BLD AUTO: 0.01 K/UL — LOW (ref 1.4–6.5)
NEUTROPHILS # BLD AUTO: 0.01 K/UL — LOW (ref 1.4–6.5)
NEUTROPHILS # BLD AUTO: 0.09 K/UL — LOW (ref 1.4–6.5)
NEUTROPHILS # BLD AUTO: 0.14 K/UL — LOW (ref 1.4–6.5)
NEUTROPHILS # BLD AUTO: 0.2 K/UL — LOW (ref 1.4–6.5)
NEUTROPHILS NFR BLD AUTO: 12 % — LOW (ref 42.2–75.2)
NEUTROPHILS NFR BLD AUTO: 37.4 % — LOW (ref 42.2–75.2)
NEUTROPHILS NFR BLD AUTO: 4 % — LOW (ref 42.2–75.2)
NEUTROPHILS NFR BLD AUTO: 40 % — LOW (ref 42.2–75.2)
NEUTROPHILS NFR BLD AUTO: 43.7 % — SIGNIFICANT CHANGE UP (ref 42.2–75.2)
NEUTS BAND # BLD: 4 % — SIGNIFICANT CHANGE UP (ref 0–6)
NEUTS BAND # BLD: 8 % — HIGH (ref 0–6)
NITRITE UR-MCNC: NEGATIVE — SIGNIFICANT CHANGE UP
NRBC # BLD: 0 /100 WBCS — SIGNIFICANT CHANGE UP (ref 0–0)
NRBC # BLD: 24 /100 WBCS — HIGH (ref 0–0)
NRBC # BLD: 4 /100 WBCS — HIGH (ref 0–0)
NRBC # BLD: SIGNIFICANT CHANGE UP /100 WBCS (ref 0–0)
ORGANISM # SPEC MICROSCOPIC CNT: ABNORMAL
ORGANISM # SPEC MICROSCOPIC CNT: ABNORMAL
ORGANISM # SPEC MICROSCOPIC CNT: SIGNIFICANT CHANGE UP
OSMOLALITY UR: 322 MOS/KG — SIGNIFICANT CHANGE UP (ref 50–1200)
OSMOLALITY UR: 389 MOS/KG — SIGNIFICANT CHANGE UP (ref 50–1200)
OSMOLALITY UR: 393 MOS/KG — SIGNIFICANT CHANGE UP (ref 50–1200)
P AERUGINOSA DNA BLD POS NAA+NON-PROBE: SIGNIFICANT CHANGE UP
PCO2 BLDV: 34 MMHG — LOW (ref 39–42)
PH BLDV: 7.44 — HIGH (ref 7.32–7.43)
PH UR: 5 — SIGNIFICANT CHANGE UP (ref 5–8)
PH UR: 7 — SIGNIFICANT CHANGE UP (ref 5–8)
PH UR: 7.5 — SIGNIFICANT CHANGE UP (ref 5–8)
PHOSPHATE 24H UR-MCNC: 5 MG/DL — SIGNIFICANT CHANGE UP
PHOSPHATE SERPL-MCNC: 1.3 MG/DL — LOW (ref 2.1–4.9)
PHOSPHATE SERPL-MCNC: 1.5 MG/DL — LOW (ref 2.1–4.9)
PHOSPHATE SERPL-MCNC: 2.7 MG/DL — SIGNIFICANT CHANGE UP (ref 2.1–4.9)
PHOSPHATE SERPL-MCNC: 3 MG/DL — SIGNIFICANT CHANGE UP (ref 2.1–4.9)
PHOSPHATE SERPL-MCNC: 4.1 MG/DL — SIGNIFICANT CHANGE UP (ref 2.1–4.9)
PHOSPHATE SERPL-MCNC: 6.8 MG/DL — HIGH (ref 2.1–4.9)
PLAT MORPH BLD: ABNORMAL
PLATELET # BLD AUTO: 108 K/UL — LOW (ref 130–400)
PLATELET # BLD AUTO: 113 K/UL — LOW (ref 130–400)
PLATELET # BLD AUTO: 16 K/UL — CRITICAL LOW (ref 130–400)
PLATELET # BLD AUTO: 22 K/UL — LOW (ref 130–400)
PLATELET # BLD AUTO: 35 K/UL — LOW (ref 130–400)
PLATELET # BLD AUTO: 40 K/UL — LOW (ref 130–400)
PLATELET CLUMP BLD QL SMEAR: ABNORMAL
PLATELET CLUMP BLD QL SMEAR: ABNORMAL
PLATELET COUNT - ESTIMATE: ABNORMAL
PMV BLD: 10.7 FL — HIGH (ref 7.4–10.4)
PMV BLD: 11.7 FL — HIGH (ref 7.4–10.4)
PMV BLD: SIGNIFICANT CHANGE UP (ref 7.4–10.4)
PO2 BLDV: 30 MMHG — SIGNIFICANT CHANGE UP (ref 25–45)
POLYCHROMASIA BLD QL SMEAR: SLIGHT — SIGNIFICANT CHANGE UP
POTASSIUM BLDV-SCNC: 3.8 MMOL/L — SIGNIFICANT CHANGE UP (ref 3.5–5.1)
POTASSIUM SERPL-MCNC: 2.3 MMOL/L — CRITICAL LOW (ref 3.5–5)
POTASSIUM SERPL-MCNC: 2.5 MMOL/L — CRITICAL LOW (ref 3.5–5)
POTASSIUM SERPL-MCNC: 2.6 MMOL/L — CRITICAL LOW (ref 3.5–5)
POTASSIUM SERPL-MCNC: 2.6 MMOL/L — CRITICAL LOW (ref 3.5–5)
POTASSIUM SERPL-MCNC: 2.7 MMOL/L — CRITICAL LOW (ref 3.5–5)
POTASSIUM SERPL-MCNC: 3 MMOL/L — LOW (ref 3.5–5)
POTASSIUM SERPL-MCNC: 3.1 MMOL/L — LOW (ref 3.5–5)
POTASSIUM SERPL-MCNC: 3.6 MMOL/L — SIGNIFICANT CHANGE UP (ref 3.5–5)
POTASSIUM SERPL-MCNC: 4 MMOL/L — SIGNIFICANT CHANGE UP (ref 3.5–5)
POTASSIUM SERPL-MCNC: 4.2 MMOL/L — SIGNIFICANT CHANGE UP (ref 3.5–5)
POTASSIUM SERPL-MCNC: 4.4 MMOL/L — SIGNIFICANT CHANGE UP (ref 3.5–5)
POTASSIUM SERPL-MCNC: 4.5 MMOL/L — SIGNIFICANT CHANGE UP (ref 3.5–5)
POTASSIUM SERPL-MCNC: 4.5 MMOL/L — SIGNIFICANT CHANGE UP (ref 3.5–5)
POTASSIUM SERPL-MCNC: 6.5 MMOL/L — CRITICAL HIGH (ref 3.5–5)
POTASSIUM SERPL-SCNC: 2.3 MMOL/L — CRITICAL LOW (ref 3.5–5)
POTASSIUM SERPL-SCNC: 2.5 MMOL/L — CRITICAL LOW (ref 3.5–5)
POTASSIUM SERPL-SCNC: 2.6 MMOL/L — CRITICAL LOW (ref 3.5–5)
POTASSIUM SERPL-SCNC: 2.6 MMOL/L — CRITICAL LOW (ref 3.5–5)
POTASSIUM SERPL-SCNC: 2.7 MMOL/L — CRITICAL LOW (ref 3.5–5)
POTASSIUM SERPL-SCNC: 3 MMOL/L — LOW (ref 3.5–5)
POTASSIUM SERPL-SCNC: 3.1 MMOL/L — LOW (ref 3.5–5)
POTASSIUM SERPL-SCNC: 3.6 MMOL/L — SIGNIFICANT CHANGE UP (ref 3.5–5)
POTASSIUM SERPL-SCNC: 4 MMOL/L — SIGNIFICANT CHANGE UP (ref 3.5–5)
POTASSIUM SERPL-SCNC: 4.2 MMOL/L — SIGNIFICANT CHANGE UP (ref 3.5–5)
POTASSIUM SERPL-SCNC: 4.4 MMOL/L — SIGNIFICANT CHANGE UP (ref 3.5–5)
POTASSIUM SERPL-SCNC: 4.5 MMOL/L — SIGNIFICANT CHANGE UP (ref 3.5–5)
POTASSIUM SERPL-SCNC: 4.5 MMOL/L — SIGNIFICANT CHANGE UP (ref 3.5–5)
POTASSIUM SERPL-SCNC: 6.5 MMOL/L — CRITICAL HIGH (ref 3.5–5)
POTASSIUM UR-SCNC: 12 MMOL/L — SIGNIFICANT CHANGE UP
POTASSIUM UR-SCNC: 39 MMOL/L — SIGNIFICANT CHANGE UP
PROCALCITONIN SERPL-MCNC: 75.7 NG/ML — HIGH (ref 0.02–0.1)
PROT ?TM UR-MCNC: 13 MG/DLG/24H — SIGNIFICANT CHANGE UP
PROT ?TM UR-MCNC: 49 MG/DLG/24H — SIGNIFICANT CHANGE UP
PROT SERPL-MCNC: 3.5 G/DL — LOW (ref 6–8)
PROT SERPL-MCNC: 3.9 G/DL — LOW (ref 6–8)
PROT SERPL-MCNC: 3.9 G/DL — LOW (ref 6–8)
PROT SERPL-MCNC: 4 G/DL — LOW (ref 6–8)
PROT SERPL-MCNC: 4.2 G/DL — LOW (ref 6–8)
PROT SERPL-MCNC: 4.3 G/DL — LOW (ref 6–8)
PROT SERPL-MCNC: 4.5 G/DL — LOW (ref 6–8)
PROT SERPL-MCNC: 5.5 G/DL — LOW (ref 6–8)
PROT UR-MCNC: 100 MG/DL
PROT UR-MCNC: 100 MG/DL
PROT UR-MCNC: 30 MG/DL
PROT/CREAT UR-RTO: 2.2 RATIO — HIGH (ref 0–0.2)
PROT/CREAT UR-RTO: 2.9 RATIO — HIGH (ref 0–0.2)
PROTHROM AB SERPL-ACNC: 18.1 SEC — HIGH (ref 9.95–12.87)
PROTHROM AB SERPL-ACNC: 20.3 SEC — HIGH (ref 9.95–12.87)
RAPID RVP RESULT: SIGNIFICANT CHANGE UP
RBC # BLD: 2.11 M/UL — LOW (ref 4.2–5.4)
RBC # BLD: 2.44 M/UL — LOW (ref 4.2–5.4)
RBC # BLD: 2.54 M/UL — LOW (ref 4.2–5.4)
RBC # BLD: 2.6 M/UL — LOW (ref 4.2–5.4)
RBC # BLD: 2.71 M/UL — LOW (ref 4.2–5.4)
RBC # BLD: 2.89 M/UL — LOW (ref 4.2–5.4)
RBC # FLD: 21.2 % — HIGH (ref 11.5–14.5)
RBC # FLD: 22.4 % — HIGH (ref 11.5–14.5)
RBC # FLD: 22.5 % — HIGH (ref 11.5–14.5)
RBC # FLD: 22.8 % — HIGH (ref 11.5–14.5)
RBC # FLD: 22.8 % — HIGH (ref 11.5–14.5)
RBC # FLD: 23.9 % — HIGH (ref 11.5–14.5)
RBC BLD AUTO: ABNORMAL
RBC CASTS # UR COMP ASSIST: 1 /HPF — SIGNIFICANT CHANGE UP (ref 0–4)
RBC CASTS # UR COMP ASSIST: 3 /HPF — SIGNIFICANT CHANGE UP (ref 0–4)
RBC CASTS # UR COMP ASSIST: 6 /HPF — HIGH (ref 0–4)
RSV RNA NPH QL NAA+NON-PROBE: SIGNIFICANT CHANGE UP
SAO2 % BLDV: 45.1 % — LOW (ref 67–88)
SARS-COV-2 RNA SPEC QL NAA+PROBE: SIGNIFICANT CHANGE UP
SARS-COV-2 RNA SPEC QL NAA+PROBE: SIGNIFICANT CHANGE UP
SODIUM SERPL-SCNC: 134 MMOL/L — LOW (ref 135–146)
SODIUM SERPL-SCNC: 135 MMOL/L — SIGNIFICANT CHANGE UP (ref 135–146)
SODIUM SERPL-SCNC: 139 MMOL/L — SIGNIFICANT CHANGE UP (ref 135–146)
SODIUM SERPL-SCNC: 142 MMOL/L — SIGNIFICANT CHANGE UP (ref 135–146)
SODIUM SERPL-SCNC: 143 MMOL/L — SIGNIFICANT CHANGE UP (ref 135–146)
SODIUM SERPL-SCNC: 145 MMOL/L — SIGNIFICANT CHANGE UP (ref 135–146)
SODIUM SERPL-SCNC: 147 MMOL/L — HIGH (ref 135–146)
SODIUM SERPL-SCNC: 151 MMOL/L — HIGH (ref 135–146)
SODIUM UR-SCNC: 111 MMOL/L — SIGNIFICANT CHANGE UP
SODIUM UR-SCNC: 131 MMOL/L — SIGNIFICANT CHANGE UP
SP GR SPEC: 1.01 — SIGNIFICANT CHANGE UP (ref 1–1.03)
SP GR SPEC: 1.01 — SIGNIFICANT CHANGE UP (ref 1–1.03)
SP GR SPEC: 1.02 — SIGNIFICANT CHANGE UP (ref 1–1.03)
SPECIMEN SOURCE: SIGNIFICANT CHANGE UP
SPHEROCYTES BLD QL SMEAR: SLIGHT — SIGNIFICANT CHANGE UP
SQUAMOUS # UR AUTO: 2 /HPF — SIGNIFICANT CHANGE UP (ref 0–5)
TROPONIN T, HIGH SENSITIVITY RESULT: 13 NG/L — SIGNIFICANT CHANGE UP (ref 6–13)
TROPONIN T, HIGH SENSITIVITY RESULT: 21 NG/L — HIGH (ref 6–13)
UROBILINOGEN FLD QL: 1 MG/DL — SIGNIFICANT CHANGE UP (ref 0.2–1)
UUN UR-MCNC: 113 MG/DL — SIGNIFICANT CHANGE UP
UUN UR-MCNC: 214 MG/DL — SIGNIFICANT CHANGE UP
VANCOMYCIN FLD-MCNC: 9.1 UG/ML — SIGNIFICANT CHANGE UP (ref 5–10)
VANCOMYCIN TROUGH SERPL-MCNC: 5.6 UG/ML — SIGNIFICANT CHANGE UP (ref 5–10)
VARIANT LYMPHS # BLD: 4 % — SIGNIFICANT CHANGE UP (ref 0–5)
WBC # BLD: 0.08 K/UL — CRITICAL LOW (ref 4.8–10.8)
WBC # BLD: 0.19 K/UL — CRITICAL LOW (ref 4.8–10.8)
WBC # BLD: 0.24 K/UL — CRITICAL LOW (ref 4.8–10.8)
WBC # BLD: 0.32 K/UL — CRITICAL LOW (ref 4.8–10.8)
WBC # BLD: 0.4 K/UL — CRITICAL LOW (ref 4.8–10.8)
WBC # BLD: 0.41 K/UL — CRITICAL LOW (ref 4.8–10.8)
WBC # FLD AUTO: 0.08 K/UL — CRITICAL LOW (ref 4.8–10.8)
WBC # FLD AUTO: 0.19 K/UL — CRITICAL LOW (ref 4.8–10.8)
WBC # FLD AUTO: 0.24 K/UL — CRITICAL LOW (ref 4.8–10.8)
WBC # FLD AUTO: 0.32 K/UL — CRITICAL LOW (ref 4.8–10.8)
WBC # FLD AUTO: 0.4 K/UL — CRITICAL LOW (ref 4.8–10.8)
WBC # FLD AUTO: 0.41 K/UL — CRITICAL LOW (ref 4.8–10.8)
WBC UR QL: 1 /HPF — SIGNIFICANT CHANGE UP (ref 0–5)
WBC UR QL: 1 /HPF — SIGNIFICANT CHANGE UP (ref 0–5)
WBC UR QL: 3 /HPF — SIGNIFICANT CHANGE UP (ref 0–5)

## 2024-01-01 PROCEDURE — 94760 N-INVAS EAR/PLS OXIMETRY 1: CPT

## 2024-01-01 PROCEDURE — 84295 ASSAY OF SERUM SODIUM: CPT

## 2024-01-01 PROCEDURE — 99291 CRITICAL CARE FIRST HOUR: CPT

## 2024-01-01 PROCEDURE — 87040 BLOOD CULTURE FOR BACTERIA: CPT

## 2024-01-01 PROCEDURE — 84300 ASSAY OF URINE SODIUM: CPT

## 2024-01-01 PROCEDURE — 84484 ASSAY OF TROPONIN QUANT: CPT

## 2024-01-01 PROCEDURE — 85027 COMPLETE CBC AUTOMATED: CPT

## 2024-01-01 PROCEDURE — 99223 1ST HOSP IP/OBS HIGH 75: CPT

## 2024-01-01 PROCEDURE — 83735 ASSAY OF MAGNESIUM: CPT

## 2024-01-01 PROCEDURE — 99232 SBSQ HOSP IP/OBS MODERATE 35: CPT

## 2024-01-01 PROCEDURE — 83010 ASSAY OF HAPTOGLOBIN QUANT: CPT

## 2024-01-01 PROCEDURE — 36415 COLL VENOUS BLD VENIPUNCTURE: CPT

## 2024-01-01 PROCEDURE — P9040: CPT

## 2024-01-01 PROCEDURE — 71045 X-RAY EXAM CHEST 1 VIEW: CPT | Mod: 26

## 2024-01-01 PROCEDURE — 74177 CT ABD & PELVIS W/CONTRAST: CPT | Mod: 26,MA

## 2024-01-01 PROCEDURE — 99233 SBSQ HOSP IP/OBS HIGH 50: CPT

## 2024-01-01 PROCEDURE — P9100: CPT

## 2024-01-01 PROCEDURE — 85730 THROMBOPLASTIN TIME PARTIAL: CPT

## 2024-01-01 PROCEDURE — 86850 RBC ANTIBODY SCREEN: CPT

## 2024-01-01 PROCEDURE — 84145 PROCALCITONIN (PCT): CPT

## 2024-01-01 PROCEDURE — 84540 ASSAY OF URINE/UREA-N: CPT

## 2024-01-01 PROCEDURE — 99291 CRITICAL CARE FIRST HOUR: CPT | Mod: GC

## 2024-01-01 PROCEDURE — 0225U NFCT DS DNA&RNA 21 SARSCOV2: CPT

## 2024-01-01 PROCEDURE — 76705 ECHO EXAM OF ABDOMEN: CPT | Mod: 26

## 2024-01-01 PROCEDURE — 80048 BASIC METABOLIC PNL TOTAL CA: CPT

## 2024-01-01 PROCEDURE — 86403 PARTICLE AGGLUT ANTBDY SCRN: CPT

## 2024-01-01 PROCEDURE — 86900 BLOOD TYPING SEROLOGIC ABO: CPT

## 2024-01-01 PROCEDURE — 87641 MR-STAPH DNA AMP PROBE: CPT

## 2024-01-01 PROCEDURE — 84132 ASSAY OF SERUM POTASSIUM: CPT

## 2024-01-01 PROCEDURE — 93306 TTE W/DOPPLER COMPLETE: CPT

## 2024-01-01 PROCEDURE — 93005 ELECTROCARDIOGRAM TRACING: CPT

## 2024-01-01 PROCEDURE — 85018 HEMOGLOBIN: CPT

## 2024-01-01 PROCEDURE — 83605 ASSAY OF LACTIC ACID: CPT

## 2024-01-01 PROCEDURE — 85025 COMPLETE CBC W/AUTO DIFF WBC: CPT

## 2024-01-01 PROCEDURE — 86901 BLOOD TYPING SEROLOGIC RH(D): CPT

## 2024-01-01 PROCEDURE — 84105 ASSAY OF URINE PHOSPHORUS: CPT

## 2024-01-01 PROCEDURE — 71045 X-RAY EXAM CHEST 1 VIEW: CPT

## 2024-01-01 PROCEDURE — 93306 TTE W/DOPPLER COMPLETE: CPT | Mod: 26

## 2024-01-01 PROCEDURE — 84100 ASSAY OF PHOSPHORUS: CPT

## 2024-01-01 PROCEDURE — 84156 ASSAY OF PROTEIN URINE: CPT

## 2024-01-01 PROCEDURE — 76705 ECHO EXAM OF ABDOMEN: CPT

## 2024-01-01 PROCEDURE — 93010 ELECTROCARDIOGRAM REPORT: CPT

## 2024-01-01 PROCEDURE — 87640 STAPH A DNA AMP PROBE: CPT

## 2024-01-01 PROCEDURE — 85014 HEMATOCRIT: CPT

## 2024-01-01 PROCEDURE — 83615 LACTATE (LD) (LDH) ENZYME: CPT

## 2024-01-01 PROCEDURE — P9037: CPT

## 2024-01-01 PROCEDURE — 82803 BLOOD GASES ANY COMBINATION: CPT

## 2024-01-01 PROCEDURE — 83935 ASSAY OF URINE OSMOLALITY: CPT

## 2024-01-01 PROCEDURE — 85610 PROTHROMBIN TIME: CPT

## 2024-01-01 PROCEDURE — 87449 NOS EACH ORGANISM AG IA: CPT

## 2024-01-01 PROCEDURE — 36430 TRANSFUSION BLD/BLD COMPNT: CPT

## 2024-01-01 PROCEDURE — 81001 URINALYSIS AUTO W/SCOPE: CPT

## 2024-01-01 PROCEDURE — 99221 1ST HOSP IP/OBS SF/LOW 40: CPT

## 2024-01-01 PROCEDURE — 80202 ASSAY OF VANCOMYCIN: CPT

## 2024-01-01 PROCEDURE — 82570 ASSAY OF URINE CREATININE: CPT

## 2024-01-01 PROCEDURE — 82330 ASSAY OF CALCIUM: CPT

## 2024-01-01 PROCEDURE — 92610 EVALUATE SWALLOWING FUNCTION: CPT | Mod: GN

## 2024-01-01 PROCEDURE — 84133 ASSAY OF URINE POTASSIUM: CPT

## 2024-01-01 PROCEDURE — 80053 COMPREHEN METABOLIC PANEL: CPT

## 2024-01-01 RX ORDER — MEROPENEM 1 G/30ML
1000 INJECTION INTRAVENOUS EVERY 8 HOURS
Refills: 0 | Status: DISCONTINUED | OUTPATIENT
Start: 2024-01-01 | End: 2024-01-01

## 2024-01-01 RX ORDER — SODIUM CHLORIDE 9 MG/ML
500 INJECTION INTRAMUSCULAR; INTRAVENOUS; SUBCUTANEOUS ONCE
Refills: 0 | Status: COMPLETED | OUTPATIENT
Start: 2024-01-01 | End: 2024-01-01

## 2024-01-01 RX ORDER — SODIUM CHLORIDE 9 MG/ML
1000 INJECTION, SOLUTION INTRAVENOUS
Refills: 0 | Status: DISCONTINUED | OUTPATIENT
Start: 2024-01-01 | End: 2024-01-01

## 2024-01-01 RX ORDER — POTASSIUM CHLORIDE 20 MEQ
20 PACKET (EA) ORAL
Refills: 0 | Status: DISCONTINUED | OUTPATIENT
Start: 2024-01-01 | End: 2024-01-01

## 2024-01-01 RX ORDER — SODIUM,POTASSIUM PHOSPHATES 278-250MG
2 POWDER IN PACKET (EA) ORAL ONCE
Refills: 0 | Status: COMPLETED | OUTPATIENT
Start: 2024-01-01 | End: 2024-01-01

## 2024-01-01 RX ORDER — VANCOMYCIN HCL 1 G
VIAL (EA) INTRAVENOUS
Refills: 0 | Status: DISCONTINUED | OUTPATIENT
Start: 2024-01-01 | End: 2024-01-01

## 2024-01-01 RX ORDER — VANCOMYCIN HCL 1 G
1000 VIAL (EA) INTRAVENOUS ONCE
Refills: 0 | Status: COMPLETED | OUTPATIENT
Start: 2024-01-01 | End: 2024-01-01

## 2024-01-01 RX ORDER — NOREPINEPHRINE BITARTRATE/D5W 8 MG/250ML
0.05 PLASTIC BAG, INJECTION (ML) INTRAVENOUS
Qty: 8 | Refills: 0 | Status: DISCONTINUED | OUTPATIENT
Start: 2024-01-01 | End: 2024-01-01

## 2024-01-01 RX ORDER — POTASSIUM CHLORIDE 20 MEQ
20 PACKET (EA) ORAL
Refills: 0 | Status: COMPLETED | OUTPATIENT
Start: 2024-01-01 | End: 2024-01-01

## 2024-01-01 RX ORDER — SODIUM BICARBONATE 1 MEQ/ML
50 SYRINGE (ML) INTRAVENOUS
Refills: 0 | Status: COMPLETED | OUTPATIENT
Start: 2024-01-01 | End: 2024-01-01

## 2024-01-01 RX ORDER — ACETAMINOPHEN 500 MG
325 TABLET ORAL ONCE
Refills: 0 | Status: COMPLETED | OUTPATIENT
Start: 2024-01-01 | End: 2024-01-01

## 2024-01-01 RX ORDER — VANCOMYCIN HCL 1 G
1000 VIAL (EA) INTRAVENOUS EVERY 12 HOURS
Refills: 0 | Status: DISCONTINUED | OUTPATIENT
Start: 2024-01-01 | End: 2024-01-01

## 2024-01-01 RX ORDER — PANTOPRAZOLE SODIUM 20 MG/1
40 TABLET, DELAYED RELEASE ORAL DAILY
Refills: 0 | Status: DISCONTINUED | OUTPATIENT
Start: 2024-01-01 | End: 2024-01-01

## 2024-01-01 RX ORDER — NOREPINEPHRINE BITARTRATE/D5W 8 MG/250ML
0.05 PLASTIC BAG, INJECTION (ML) INTRAVENOUS
Qty: 16 | Refills: 0 | Status: DISCONTINUED | OUTPATIENT
Start: 2024-01-01 | End: 2024-01-01

## 2024-01-01 RX ORDER — DESMOPRESSIN ACETATE 0.1 MG/1
0.1 TABLET ORAL EVERY 12 HOURS
Refills: 0 | Status: DISCONTINUED | OUTPATIENT
Start: 2024-01-01 | End: 2024-01-01

## 2024-01-01 RX ORDER — ALLOPURINOL 300 MG
200 TABLET ORAL DAILY
Refills: 0 | Status: DISCONTINUED | OUTPATIENT
Start: 2024-02-13 | End: 2024-01-01

## 2024-01-01 RX ORDER — MEROPENEM 1 G/30ML
1000 INJECTION INTRAVENOUS ONCE
Refills: 0 | Status: COMPLETED | OUTPATIENT
Start: 2024-01-01 | End: 2024-01-01

## 2024-01-01 RX ORDER — DESMOPRESSIN ACETATE 0.1 MG/1
2 TABLET ORAL ONCE
Refills: 0 | Status: COMPLETED | OUTPATIENT
Start: 2024-01-01 | End: 2024-01-01

## 2024-01-01 RX ORDER — CEFEPIME 1 G/1
INJECTION, POWDER, FOR SOLUTION INTRAMUSCULAR; INTRAVENOUS
Refills: 0 | Status: COMPLETED | OUTPATIENT
Start: 2024-01-01 | End: 2024-01-01

## 2024-01-01 RX ORDER — ACETAMINOPHEN 500 MG
1000 TABLET ORAL ONCE
Refills: 0 | Status: COMPLETED | OUTPATIENT
Start: 2024-01-01 | End: 2024-01-01

## 2024-01-01 RX ORDER — SODIUM BICARBONATE 1 MEQ/ML
50 SYRINGE (ML) INTRAVENOUS
Refills: 0 | Status: DISCONTINUED | OUTPATIENT
Start: 2024-01-01 | End: 2024-01-01

## 2024-01-01 RX ORDER — SODIUM CHLORIDE 9 MG/ML
250 INJECTION, SOLUTION INTRAVENOUS ONCE
Refills: 0 | Status: DISCONTINUED | OUTPATIENT
Start: 2024-01-01 | End: 2024-01-01

## 2024-01-01 RX ORDER — VASOPRESSIN 20 [USP'U]/ML
0.02 INJECTION INTRAVENOUS
Qty: 40 | Refills: 0 | Status: DISCONTINUED | OUTPATIENT
Start: 2024-01-01 | End: 2024-01-01

## 2024-01-01 RX ORDER — DESMOPRESSIN ACETATE 0.1 MG/1
1 TABLET ORAL EVERY 12 HOURS
Refills: 0 | Status: DISCONTINUED | OUTPATIENT
Start: 2024-01-01 | End: 2024-01-01

## 2024-01-01 RX ORDER — ROBINUL 0.2 MG/ML
0.2 INJECTION INTRAMUSCULAR; INTRAVENOUS ONCE
Refills: 0 | Status: COMPLETED | OUTPATIENT
Start: 2024-01-01 | End: 2024-01-01

## 2024-01-01 RX ORDER — MORPHINE SULFATE 50 MG/1
2 CAPSULE, EXTENDED RELEASE ORAL
Refills: 0 | Status: DISCONTINUED | OUTPATIENT
Start: 2024-01-01 | End: 2024-01-01

## 2024-01-01 RX ORDER — ACETAMINOPHEN 500 MG
650 TABLET ORAL ONCE
Refills: 0 | Status: COMPLETED | OUTPATIENT
Start: 2024-01-01 | End: 2024-01-01

## 2024-01-01 RX ORDER — CEFEPIME 1 G/1
2000 INJECTION, POWDER, FOR SOLUTION INTRAMUSCULAR; INTRAVENOUS EVERY 8 HOURS
Refills: 0 | Status: COMPLETED | OUTPATIENT
Start: 2024-01-01 | End: 2024-01-01

## 2024-01-01 RX ORDER — MEROPENEM 1 G/30ML
INJECTION INTRAVENOUS
Refills: 0 | Status: DISCONTINUED | OUTPATIENT
Start: 2024-01-01 | End: 2024-01-01

## 2024-01-01 RX ORDER — IBUPROFEN 200 MG
400 TABLET ORAL EVERY 8 HOURS
Refills: 0 | Status: DISCONTINUED | OUTPATIENT
Start: 2024-01-01 | End: 2024-01-01

## 2024-01-01 RX ORDER — POTASSIUM CHLORIDE 20 MEQ
40 PACKET (EA) ORAL ONCE
Refills: 0 | Status: COMPLETED | OUTPATIENT
Start: 2024-01-01 | End: 2024-01-01

## 2024-01-01 RX ORDER — MAGNESIUM SULFATE 500 MG/ML
2 VIAL (ML) INJECTION
Refills: 0 | Status: COMPLETED | OUTPATIENT
Start: 2024-01-01 | End: 2024-01-01

## 2024-01-01 RX ORDER — SODIUM CHLORIDE 9 MG/ML
250 INJECTION INTRAMUSCULAR; INTRAVENOUS; SUBCUTANEOUS ONCE
Refills: 0 | Status: DISCONTINUED | OUTPATIENT
Start: 2024-01-01 | End: 2024-01-01

## 2024-01-01 RX ORDER — POTASSIUM PHOSPHATE, MONOBASIC POTASSIUM PHOSPHATE, DIBASIC 236; 224 MG/ML; MG/ML
30 INJECTION, SOLUTION INTRAVENOUS ONCE
Refills: 0 | Status: COMPLETED | OUTPATIENT
Start: 2024-01-01 | End: 2024-01-01

## 2024-01-01 RX ORDER — DIPHENHYDRAMINE HCL 50 MG
25 CAPSULE ORAL ONCE
Refills: 0 | Status: DISCONTINUED | OUTPATIENT
Start: 2024-01-01 | End: 2024-01-01

## 2024-01-01 RX ORDER — POTASSIUM CHLORIDE 20 MEQ
20 PACKET (EA) ORAL ONCE
Refills: 0 | Status: COMPLETED | OUTPATIENT
Start: 2024-01-01 | End: 2024-01-01

## 2024-01-01 RX ORDER — ALLOPURINOL 300 MG
300 TABLET ORAL DAILY
Refills: 0 | Status: DISCONTINUED | OUTPATIENT
Start: 2024-01-01 | End: 2024-01-01

## 2024-01-01 RX ORDER — FILGRASTIM 480MCG/1.6
480 VIAL (ML) INJECTION EVERY 24 HOURS
Refills: 0 | Status: COMPLETED | OUTPATIENT
Start: 2024-01-01 | End: 2024-01-01

## 2024-01-01 RX ORDER — CHLORHEXIDINE GLUCONATE 213 G/1000ML
1 SOLUTION TOPICAL DAILY
Refills: 0 | Status: DISCONTINUED | OUTPATIENT
Start: 2024-01-01 | End: 2024-01-01

## 2024-01-01 RX ORDER — CEFEPIME 1 G/1
1000 INJECTION, POWDER, FOR SOLUTION INTRAMUSCULAR; INTRAVENOUS EVERY 12 HOURS
Refills: 0 | Status: DISCONTINUED | OUTPATIENT
Start: 2024-01-01 | End: 2024-01-01

## 2024-01-01 RX ORDER — DIPHENHYDRAMINE HCL 50 MG
25 CAPSULE ORAL ONCE
Refills: 0 | Status: COMPLETED | OUTPATIENT
Start: 2024-01-01 | End: 2024-01-01

## 2024-01-01 RX ORDER — PANTOPRAZOLE SODIUM 20 MG/1
40 TABLET, DELAYED RELEASE ORAL
Refills: 0 | Status: DISCONTINUED | OUTPATIENT
Start: 2024-01-01 | End: 2024-01-01

## 2024-01-01 RX ORDER — SODIUM CHLORIDE 9 MG/ML
500 INJECTION, SOLUTION INTRAVENOUS ONCE
Refills: 0 | Status: COMPLETED | OUTPATIENT
Start: 2024-01-01 | End: 2024-01-01

## 2024-01-01 RX ORDER — HYDROMORPHONE HYDROCHLORIDE 2 MG/ML
0.2 INJECTION INTRAMUSCULAR; INTRAVENOUS; SUBCUTANEOUS
Refills: 0 | Status: DISCONTINUED | OUTPATIENT
Start: 2024-01-01 | End: 2024-01-01

## 2024-01-01 RX ORDER — CEFEPIME 1 G/1
2000 INJECTION, POWDER, FOR SOLUTION INTRAMUSCULAR; INTRAVENOUS EVERY 8 HOURS
Refills: 0 | Status: DISCONTINUED | OUTPATIENT
Start: 2024-01-01 | End: 2024-01-01

## 2024-01-01 RX ORDER — HYDROMORPHONE HYDROCHLORIDE 2 MG/ML
0.5 INJECTION INTRAMUSCULAR; INTRAVENOUS; SUBCUTANEOUS ONCE
Refills: 0 | Status: DISCONTINUED | OUTPATIENT
Start: 2024-01-01 | End: 2024-01-01

## 2024-01-01 RX ORDER — PHENYLEPHRINE HYDROCHLORIDE 10 MG/ML
0.5 INJECTION INTRAVENOUS
Qty: 160 | Refills: 0 | Status: DISCONTINUED | OUTPATIENT
Start: 2024-01-01 | End: 2024-01-01

## 2024-01-01 RX ORDER — ONDANSETRON 8 MG/1
4 TABLET, FILM COATED ORAL ONCE
Refills: 0 | Status: COMPLETED | OUTPATIENT
Start: 2024-01-01 | End: 2024-01-01

## 2024-01-01 RX ORDER — SODIUM BICARBONATE 1 MEQ/ML
0.2 SYRINGE (ML) INTRAVENOUS
Qty: 150 | Refills: 0 | Status: DISCONTINUED | OUTPATIENT
Start: 2024-01-01 | End: 2024-01-01

## 2024-01-01 RX ORDER — CEFEPIME 1 G/1
2000 INJECTION, POWDER, FOR SOLUTION INTRAMUSCULAR; INTRAVENOUS ONCE
Refills: 0 | Status: COMPLETED | OUTPATIENT
Start: 2024-01-01 | End: 2024-01-01

## 2024-01-01 RX ORDER — MEROPENEM 1 G/30ML
2000 INJECTION INTRAVENOUS EVERY 12 HOURS
Refills: 0 | Status: DISCONTINUED | OUTPATIENT
Start: 2024-01-01 | End: 2024-01-01

## 2024-01-01 RX ORDER — SODIUM CHLORIDE 9 MG/ML
1000 INJECTION, SOLUTION INTRAVENOUS ONCE
Refills: 0 | Status: DISCONTINUED | OUTPATIENT
Start: 2024-01-01 | End: 2024-01-01

## 2024-01-01 RX ORDER — AMIKACIN SULFATE 250 MG/ML
1000 INJECTION, SOLUTION INTRAMUSCULAR; INTRAVENOUS ONCE
Refills: 0 | Status: COMPLETED | OUTPATIENT
Start: 2024-01-01 | End: 2024-01-01

## 2024-01-01 RX ORDER — VANCOMYCIN HCL 1 G
1100 VIAL (EA) INTRAVENOUS EVERY 12 HOURS
Refills: 0 | Status: DISCONTINUED | OUTPATIENT
Start: 2024-01-01 | End: 2024-01-01

## 2024-01-01 RX ORDER — MAGNESIUM SULFATE 500 MG/ML
2 VIAL (ML) INJECTION ONCE
Refills: 0 | Status: COMPLETED | OUTPATIENT
Start: 2024-01-01 | End: 2024-01-01

## 2024-01-01 RX ORDER — POTASSIUM CHLORIDE 20 MEQ
40 PACKET (EA) ORAL ONCE
Refills: 0 | Status: DISCONTINUED | OUTPATIENT
Start: 2024-01-01 | End: 2024-01-01

## 2024-01-01 RX ORDER — SODIUM CHLORIDE 9 MG/ML
1000 INJECTION, SOLUTION INTRAVENOUS ONCE
Refills: 0 | Status: COMPLETED | OUTPATIENT
Start: 2024-01-01 | End: 2024-01-01

## 2024-01-01 RX ADMIN — Medication 25 GRAM(S): at 13:47

## 2024-01-01 RX ADMIN — Medication 0.5 MILLIGRAM(S): at 16:17

## 2024-01-01 RX ADMIN — MEROPENEM 46.67 MILLIGRAM(S): 1 INJECTION INTRAVENOUS at 18:51

## 2024-01-01 RX ADMIN — Medication 50 MILLIEQUIVALENT(S): at 17:43

## 2024-01-01 RX ADMIN — Medication 325 MILLIGRAM(S): at 18:22

## 2024-01-01 RX ADMIN — MORPHINE SULFATE 2 MILLIGRAM(S): 50 CAPSULE, EXTENDED RELEASE ORAL at 17:20

## 2024-01-01 RX ADMIN — PHENYLEPHRINE HYDROCHLORIDE 6.89 MICROGRAM(S)/KG/MIN: 10 INJECTION INTRAVENOUS at 20:50

## 2024-01-01 RX ADMIN — Medication 50 MILLIEQUIVALENT(S): at 11:36

## 2024-01-01 RX ADMIN — Medication 50 MILLIEQUIVALENT(S): at 00:49

## 2024-01-01 RX ADMIN — Medication 250 MILLIGRAM(S): at 05:53

## 2024-01-01 RX ADMIN — MEROPENEM 46.67 MILLIGRAM(S): 1 INJECTION INTRAVENOUS at 05:12

## 2024-01-01 RX ADMIN — POTASSIUM PHOSPHATE, MONOBASIC POTASSIUM PHOSPHATE, DIBASIC 83.33 MILLIMOLE(S): 236; 224 INJECTION, SOLUTION INTRAVENOUS at 16:22

## 2024-01-01 RX ADMIN — Medication 50 MILLIEQUIVALENT(S): at 09:20

## 2024-01-01 RX ADMIN — Medication 25 GRAM(S): at 09:56

## 2024-01-01 RX ADMIN — Medication 100 MEQ/KG/HR: at 21:53

## 2024-01-01 RX ADMIN — DESMOPRESSIN ACETATE 1 MICROGRAM(S): 0.1 TABLET ORAL at 21:39

## 2024-01-01 RX ADMIN — CEFEPIME 100 MILLIGRAM(S): 1 INJECTION, POWDER, FOR SOLUTION INTRAMUSCULAR; INTRAVENOUS at 04:49

## 2024-01-01 RX ADMIN — MEROPENEM 46.67 MILLIGRAM(S): 1 INJECTION INTRAVENOUS at 05:45

## 2024-01-01 RX ADMIN — Medication 400 MILLIGRAM(S): at 23:27

## 2024-01-01 RX ADMIN — PHENYLEPHRINE HYDROCHLORIDE 6.89 MICROGRAM(S)/KG/MIN: 10 INJECTION INTRAVENOUS at 05:45

## 2024-01-01 RX ADMIN — PHENYLEPHRINE HYDROCHLORIDE 6.89 MICROGRAM(S)/KG/MIN: 10 INJECTION INTRAVENOUS at 16:22

## 2024-01-01 RX ADMIN — PHENYLEPHRINE HYDROCHLORIDE 6.89 MICROGRAM(S)/KG/MIN: 10 INJECTION INTRAVENOUS at 13:08

## 2024-01-01 RX ADMIN — PHENYLEPHRINE HYDROCHLORIDE 6.89 MICROGRAM(S)/KG/MIN: 10 INJECTION INTRAVENOUS at 05:12

## 2024-01-01 RX ADMIN — Medication 480 MICROGRAM(S): at 12:25

## 2024-01-01 RX ADMIN — Medication 25 MILLIGRAM(S): at 21:08

## 2024-01-01 RX ADMIN — ONDANSETRON 4 MILLIGRAM(S): 8 TABLET, FILM COATED ORAL at 18:11

## 2024-01-01 RX ADMIN — HYDROMORPHONE HYDROCHLORIDE 0.5 MILLIGRAM(S): 2 INJECTION INTRAMUSCULAR; INTRAVENOUS; SUBCUTANEOUS at 16:18

## 2024-01-01 RX ADMIN — Medication 50 MILLIEQUIVALENT(S): at 03:10

## 2024-01-01 RX ADMIN — Medication 50 MILLIEQUIVALENT(S): at 08:31

## 2024-01-01 RX ADMIN — SODIUM CHLORIDE 1000 MILLILITER(S): 9 INJECTION, SOLUTION INTRAVENOUS at 10:44

## 2024-01-01 RX ADMIN — CEFEPIME 100 MILLIGRAM(S): 1 INJECTION, POWDER, FOR SOLUTION INTRAMUSCULAR; INTRAVENOUS at 19:04

## 2024-01-01 RX ADMIN — AMIKACIN SULFATE 104 MILLIGRAM(S): 250 INJECTION, SOLUTION INTRAMUSCULAR; INTRAVENOUS at 16:35

## 2024-01-01 RX ADMIN — Medication 50 MILLIEQUIVALENT(S): at 16:26

## 2024-01-01 RX ADMIN — Medication 650 MILLIGRAM(S): at 21:10

## 2024-01-01 RX ADMIN — Medication 50 MILLIEQUIVALENT(S): at 16:21

## 2024-01-01 RX ADMIN — PHENYLEPHRINE HYDROCHLORIDE 6.89 MICROGRAM(S)/KG/MIN: 10 INJECTION INTRAVENOUS at 19:26

## 2024-01-01 RX ADMIN — MEROPENEM 46.67 MILLIGRAM(S): 1 INJECTION INTRAVENOUS at 18:16

## 2024-01-01 RX ADMIN — CEFEPIME 100 MILLIGRAM(S): 1 INJECTION, POWDER, FOR SOLUTION INTRAMUSCULAR; INTRAVENOUS at 05:54

## 2024-01-01 RX ADMIN — Medication 50 MILLIEQUIVALENT(S): at 19:27

## 2024-01-01 RX ADMIN — PHENYLEPHRINE HYDROCHLORIDE 6.89 MICROGRAM(S)/KG/MIN: 10 INJECTION INTRAVENOUS at 02:38

## 2024-01-01 RX ADMIN — Medication 50 MILLIEQUIVALENT(S): at 14:31

## 2024-01-01 RX ADMIN — PHENYLEPHRINE HYDROCHLORIDE 6.89 MICROGRAM(S)/KG/MIN: 10 INJECTION INTRAVENOUS at 10:15

## 2024-01-01 RX ADMIN — ROBINUL 0.2 MILLIGRAM(S): 0.2 INJECTION INTRAMUSCULAR; INTRAVENOUS at 16:17

## 2024-01-01 RX ADMIN — SODIUM CHLORIDE 1000 MILLILITER(S): 9 INJECTION, SOLUTION INTRAVENOUS at 07:32

## 2024-01-01 RX ADMIN — Medication 50 MILLIEQUIVALENT(S): at 18:14

## 2024-01-01 RX ADMIN — Medication 2 PACKET(S): at 11:37

## 2024-01-01 RX ADMIN — SODIUM CHLORIDE 1000 MILLILITER(S): 9 INJECTION INTRAMUSCULAR; INTRAVENOUS; SUBCUTANEOUS at 02:39

## 2024-01-01 RX ADMIN — Medication 650 MILLIGRAM(S): at 02:35

## 2024-01-01 RX ADMIN — Medication 50 MILLIEQUIVALENT(S): at 22:57

## 2024-01-01 RX ADMIN — PANTOPRAZOLE SODIUM 40 MILLIGRAM(S): 20 TABLET, DELAYED RELEASE ORAL at 17:43

## 2024-01-01 RX ADMIN — Medication 650 MILLIGRAM(S): at 22:06

## 2024-01-01 RX ADMIN — Medication 1000 MILLIGRAM(S): at 01:00

## 2024-01-01 RX ADMIN — CEFEPIME 100 MILLIGRAM(S): 1 INJECTION, POWDER, FOR SOLUTION INTRAMUSCULAR; INTRAVENOUS at 15:00

## 2024-01-01 RX ADMIN — Medication 50 MILLIEQUIVALENT(S): at 09:15

## 2024-01-01 RX ADMIN — HYDROMORPHONE HYDROCHLORIDE 0.5 MILLIGRAM(S): 2 INJECTION INTRAMUSCULAR; INTRAVENOUS; SUBCUTANEOUS at 16:22

## 2024-01-01 RX ADMIN — Medication 25 GRAM(S): at 11:36

## 2024-01-01 RX ADMIN — Medication 650 MILLIGRAM(S): at 18:23

## 2024-01-01 RX ADMIN — Medication 40 MILLIEQUIVALENT(S): at 13:47

## 2024-01-01 RX ADMIN — MEROPENEM 100 MILLIGRAM(S): 1 INJECTION INTRAVENOUS at 10:45

## 2024-01-01 RX ADMIN — MEROPENEM 46.67 MILLIGRAM(S): 1 INJECTION INTRAVENOUS at 05:56

## 2024-01-01 RX ADMIN — VASOPRESSIN 3 UNIT(S)/MIN: 20 INJECTION INTRAVENOUS at 10:54

## 2024-01-01 RX ADMIN — Medication 1 MILLIGRAM(S): at 17:20

## 2024-01-01 RX ADMIN — Medication 50 MILLIEQUIVALENT(S): at 09:25

## 2024-01-01 RX ADMIN — SODIUM CHLORIDE 100 MILLILITER(S): 9 INJECTION, SOLUTION INTRAVENOUS at 13:09

## 2024-01-01 RX ADMIN — PHENYLEPHRINE HYDROCHLORIDE 6.89 MICROGRAM(S)/KG/MIN: 10 INJECTION INTRAVENOUS at 01:34

## 2024-01-01 RX ADMIN — Medication 325 MILLIGRAM(S): at 02:35

## 2024-01-01 RX ADMIN — SODIUM CHLORIDE 100 MILLILITER(S): 9 INJECTION, SOLUTION INTRAVENOUS at 00:48

## 2024-01-01 RX ADMIN — Medication 166.67 MILLIGRAM(S): at 19:04

## 2024-01-01 RX ADMIN — Medication 250 MILLIGRAM(S): at 16:19

## 2024-01-01 RX ADMIN — VASOPRESSIN 3 UNIT(S)/MIN: 20 INJECTION INTRAVENOUS at 21:35

## 2024-01-01 RX ADMIN — Medication 50 MILLIEQUIVALENT(S): at 13:47

## 2024-01-01 RX ADMIN — Medication 300 MILLIGRAM(S): at 11:37

## 2024-01-01 RX ADMIN — Medication 400 MILLIGRAM(S): at 21:30

## 2024-01-01 RX ADMIN — SODIUM CHLORIDE 500 MILLILITER(S): 9 INJECTION INTRAMUSCULAR; INTRAVENOUS; SUBCUTANEOUS at 22:21

## 2024-01-01 RX ADMIN — Medication 50 MILLIEQUIVALENT(S): at 16:01

## 2024-01-01 RX ADMIN — Medication 1000 MILLIGRAM(S): at 03:26

## 2024-01-01 RX ADMIN — Medication 50 MILLIEQUIVALENT(S): at 10:24

## 2024-01-01 RX ADMIN — PHENYLEPHRINE HYDROCHLORIDE 6.89 MICROGRAM(S)/KG/MIN: 10 INJECTION INTRAVENOUS at 21:35

## 2024-01-01 RX ADMIN — SODIUM CHLORIDE 100 MILLILITER(S): 9 INJECTION, SOLUTION INTRAVENOUS at 08:33

## 2024-01-01 RX ADMIN — MEROPENEM 46.67 MILLIGRAM(S): 1 INJECTION INTRAVENOUS at 18:29

## 2024-01-01 RX ADMIN — DESMOPRESSIN ACETATE 2 MICROGRAM(S): 0.1 TABLET ORAL at 09:56

## 2024-01-01 RX ADMIN — Medication 400 MILLIGRAM(S): at 03:09

## 2024-01-01 RX ADMIN — CHLORHEXIDINE GLUCONATE 1 APPLICATION(S): 213 SOLUTION TOPICAL at 16:26

## 2024-01-01 RX ADMIN — Medication 100 MEQ/KG/HR: at 10:43

## 2024-01-01 RX ADMIN — Medication 480 MICROGRAM(S): at 11:37

## 2024-01-01 RX ADMIN — SODIUM CHLORIDE 75 MILLILITER(S): 9 INJECTION, SOLUTION INTRAVENOUS at 09:25

## 2024-01-01 RX ADMIN — Medication 50 MILLIEQUIVALENT(S): at 21:06

## 2024-01-01 RX ADMIN — Medication 6.89 MICROGRAM(S)/KG/MIN: at 23:16

## 2024-01-01 RX ADMIN — PHENYLEPHRINE HYDROCHLORIDE 6.89 MICROGRAM(S)/KG/MIN: 10 INJECTION INTRAVENOUS at 00:49

## 2024-01-01 RX ADMIN — Medication 325 MILLIGRAM(S): at 13:34

## 2024-01-01 RX ADMIN — Medication 250 MILLIGRAM(S): at 05:45

## 2024-02-08 NOTE — ED PROVIDER NOTE - PROGRESS NOTE DETAILS
pk: GOC conversation had with family, pt is full code, would like to have everything done for her and measures only to be held in the event she becomes terminally ill or permanently requires a ventilator. sepsis workup initiated and patient placed on bipap immediately endorsed to Addi fs: patient became hypotensive, we initiated pressors , I have consulted ICU who evaluated patient in the ED advised admission to ICU patient did not decompensate from resp position, patient stable on bipap I will admit for further evaluation

## 2024-02-08 NOTE — ED PROVIDER NOTE - ATTENDING CONTRIBUTION TO CARE
78-year-old female history of AML on chemotherapy was at the infusion center earlier and received what sounds like Neupogen as well as IV fluids and went home however became progressively more short of breath through the course of the day, on arrival to ED patient awake and alert but very ill-appearing, febrile, tachypneic, gurgling, conjunctive a pale dry mucous membranes neck supple cor tachycardic and regular, lungs with scattered rhonchi abdomen soft nontender calves nontender pulses equal neurologically nonfocal, medicated for dry heaves and placed on BiPAP with improvement in work of breathing, will check labs, imaging, antibiotics, hold fluids for now, after extensive conversation with family patient is a full code

## 2024-02-08 NOTE — ED PROVIDER NOTE - PHYSICAL EXAMINATION
CONSTITUTIONAL: lethargic, chronically ill appearing  SKIN: pale, diaphoretic  HEAD: NCAT  EYES: EOMI, PERRLA, no scleral icterus, conjunctiva pink  ENT: normal pharynx with no erythema or exudates  NECK: Supple; non tender. Full ROM.  CARD: RRR, no murmurs.  RESP: crackles bilaterally  ABD: mildly distended  EXT: no TTP, FROM  NEURO: normal motor. normal sensory.

## 2024-02-08 NOTE — ED PROVIDER NOTE - CLINICAL SUMMARY MEDICAL DECISION MAKING FREE TEXT BOX
fs: patient became hypotensive, we initiated pressors , I have consulted ICU who evaluated patient in the ED advised admission to ICU patient did not decompensate from resp position, patient stable on bipap I will admit for further evaluation most likely secondary to infection given neutropenia considering fever, patient given iv antibiotics.  in addition given clinical scenario patient given 1 unit of prbc patient admitted to ICU

## 2024-02-08 NOTE — ED ADULT NURSE NOTE - CHIEF COMPLAINT QUOTE
BIBA, as per EMS "pt with Leukemia, c/o fever and generalized malaise.  with PNA". put on 4LNC stating at 90%RA at home

## 2024-02-08 NOTE — ED ADULT NURSE NOTE - NSFALLHARMRISKINTERV_ED_ALL_ED

## 2024-02-08 NOTE — ED PROVIDER NOTE - OBJECTIVE STATEMENT
Patient is a 78-year-old female past medical history of AML on chemo followed by Dr. Marsh presenting to the ED for evaluation of weakness and shortness of breath since earlier today. Daughter states that she was seen by Dr. Marsh, had an "injection to increase her blood count," received two liters of fluids. Shortly afterwards patient had increased work of breathing and began to feel weak. Otherwise denies any chills, headache, changes in vision, cough, congestion, cp, palpitations, n/v/d, abd pain, constipation, urinary complaints.

## 2024-02-08 NOTE — ED PROVIDER NOTE - CADM POA PRESS ULCER
Appropriate contact lens hygiene, care, and wear principles were reviewed with the patient to ensure proper use and minimize risk of contact lens-related issues. No

## 2024-02-09 PROBLEM — I67.1 CEREBRAL ANEURYSM, NONRUPTURED: Chronic | Status: ACTIVE | Noted: 2022-11-11

## 2024-02-09 PROBLEM — C92.00 ACUTE MYELOBLASTIC LEUKEMIA, NOT HAVING ACHIEVED REMISSION: Chronic | Status: ACTIVE | Noted: 2022-11-11

## 2024-02-09 NOTE — PATIENT PROFILE ADULT - FALL HARM RISK - RISK INTERVENTIONS

## 2024-02-09 NOTE — CONSULT NOTE ADULT - SUBJECTIVE AND OBJECTIVE BOX
Patient is a 78y old  Female who presents with a chief complaint of Neutropenic fever (09 Feb 2024 17:32)    Patient well known to me. Saw her in the CCU at the request of CCU Attending Physician.  Patient has AML in remission. On maintenance chemotherapy with Dacogen and Venclexta.  Patient completed chemotherapy last week.   Now admitted with neutropenic fever and positive blood cultures with gram negative rods and hypotension and severe anemia.  She spiked fever of 104 degrees F last night. She received 1 unit of packed cell transfusion.  On examination, she has dried throat and oxygen.  Lungs: few rhonchi  Heart sounds regular with tachycardia.  Abdomen is soft.  No edema  No neurologic deficit assessment.  Neutropenic fever      HPI:  chemo followed by Dr. Marsh presenting to the ED for evaluation of weakness and shortness of breath since earlier today. Daughter states that she was seen by Dr. Marsh, had an "injection to increase her blood count," received two liters of fluids. Shortly afterwards patient had increased work of breathing and began to feel weak. Otherwise denies any chills, headache, changes in vision, cough, congestion, cp, palpitations, n/v/d, abd pain, constipation, urinary complaints.   (09 Feb 2024 00:08)       ROS:  Negative except for:    PAST MEDICAL & SURGICAL HISTORY:  AML (acute myeloid leukemia)  on chemotx      Brain aneurysm  S/P coiling      S/P cerebral aneurysm repair  coiling          SOCIAL HISTORY:    FAMILY HISTORY:  FH: heart disease (Mother)        MEDICATIONS  (STANDING):  allopurinol 300 milliGRAM(s) Oral daily  meropenem  IVPB 2000 milliGRAM(s) IV Intermittent every 12 hours  norepinephrine Infusion 0.05 MICROgram(s)/kG/Min (6.89 mL/Hr) IV Continuous <Continuous>  phenylephrine    Infusion 0.5 MICROgram(s)/kG/Min (6.89 mL/Hr) IV Continuous <Continuous>  sodium bicarbonate  Infusion 0.202 mEq/kG/Hr (100 mL/Hr) IV Continuous <Continuous>  vasopressin Infusion 0.02 Unit(s)/Min (3 mL/Hr) IV Continuous <Continuous>    MEDICATIONS  (PRN):      Allergies    No Known Allergies    Intolerances        Vital Signs Last 24 Hrs  T(C): 37.1 (09 Feb 2024 15:00), Max: 40.4 (08 Feb 2024 21:30)  T(F): 98.8 (09 Feb 2024 15:00), Max: 104.7 (08 Feb 2024 21:30)  HR: 101 (09 Feb 2024 18:13) (101 - 137)  BP: 104/44 (09 Feb 2024 18:00) (66/38 - 122/59)  BP(mean): 63 (09 Feb 2024 18:00) (52 - 91)  RR: 26 (09 Feb 2024 18:00) (18 - 51)  SpO2: 100% (09 Feb 2024 18:13) (82% - 100%)    Parameters below as of 09 Feb 2024 12:00  Patient On (Oxygen Delivery Method): BiPAP/CPAP    O2 Concentration (%): 45    PHYSICAL EXAM  General: adult in NAD  HEENT: clear oropharynx, anicteric sclera, pink conjunctiva  Neck: supple  CV: normal S1/S2 with no murmur rubs or gallops  Lungs: positive air movement b/l ant lungs,clear to auscultation, no wheezes, no rales  Abdomen: soft non-tender non-distended, no hepatosplenomegaly  Ext: no clubbing cyanosis or edema  Skin: no rashes and no petechiae  Neuro: alert and oriented X 4, no focal deficits      LABS:                          7.6    0.40  )-----------( 108      ( 09 Feb 2024 06:16 )             25.1         Mean Cell Volume : 102.9 fL  Mean Cell Hemoglobin : 31.1 pg  Mean Cell Hemoglobin Concentration : 30.3 g/dL  Auto Neutrophil # : x  Auto Lymphocyte # : x  Auto Monocyte # : x  Auto Eosinophil # : x  Auto Basophil # : x  Auto Neutrophil % : x  Auto Lymphocyte % : x  Auto Monocyte % : x  Auto Eosinophil % : x  Auto Basophil % : x      Serial CBC's  02-09 @ 06:16  Hct-25.1 / Hgb-7.6 / Plat-108 / RBC-2.44 / WBC-0.40  Serial CBC's  02-08 @ 18:10  Hct-20.6 / Hgb-6.8 / Plat-113 / RBC-2.11 / WBC-0.08      02-09    142  |  102  |  30<H>  ----------------------------<  180<H>  4.5   |  10<L>  |  1.5    Ca    7.5<L>      09 Feb 2024 11:00  Phos  6.8     02-09  Mg     1.7     02-09    TPro  4.5<L>  /  Alb  2.8<L>  /  TBili  6.4<H>  /  DBili  x   /  AST  119<H>  /  ALT  54<H>  /  AlkPhos  44  02-09      PT/INR - ( 08 Feb 2024 18:10 )   PT: 18.10 sec;   INR: 1.58 ratio         PTT - ( 08 Feb 2024 18:10 )  PTT:38.3 sec                BLOOD SMEAR INTERPRETATION:       RADIOLOGY & ADDITIONAL STUDIES:

## 2024-02-09 NOTE — CONSULT NOTE ADULT - SUBJECTIVE AND OBJECTIVE BOX
MARAL COURTNEY  78y, Female  Allergies    No Known Allergies    Intolerances      LOS  1d    HPI  HPI:  chemo followed by Dr. Marsh presenting to the ED for evaluation of weakness and shortness of breath since earlier today. Daughter states that she was seen by Dr. Marsh, had an "injection to increase her blood count," received two liters of fluids. Shortly afterwards patient had increased work of breathing and began to feel weak. Otherwise denies any chills, headache, changes in vision, cough, congestion, cp, palpitations, n/v/d, abd pain, constipation, urinary complaints.   (09 Feb 2024 00:08)      INFECTIOUS DISEASE HISTORY:  Hospital course-  ID consulted for antimicrobial recommendations.     Prior hospital charts reviewed [Yes]  Primary team notes reviewed [Yes]  Other consultant notes reviewed [Yes]    REVIEW OF SYSTEMS:  CONSTITUTIONAL: No fever or chills  HEENT: No sore throat  RESPIRATORY: No cough, no shortness of breath  CARDIOVASCULAR: No chest pain or palpitations  GASTROINTESTINAL: No abdominal or epigastric pain  GENITOURINARY: No dysuria  NEUROLOGICAL: No headache/dizziness  MSK: No joint pain, erythema, or swelling; no back pain  SKIN: No itching, rashes  All other ROS negative except noted above    PAST MEDICAL & SURGICAL HISTORY:  AML (acute myeloid leukemia)  on chemotx      Brain aneurysm  S/P coiling      S/P cerebral aneurysm repair  coiling      SOCIAL HISTORY:  - No recent travel    FAMILY HISTORY:  FH: heart disease (Mother)    ANTIMICROBIALS:  meropenem  IVPB    meropenem  IVPB 1000 every 8 hours  vancomycin  IVPB 1000 every 12 hours      ANTIMICROBIALS (past 90 days):  MEDICATIONS  (STANDING):    cefepime   IVPB   100 mL/Hr IV Intermittent (02-08-24 @ 19:04)    cefepime   IVPB   100 mL/Hr IV Intermittent (02-09-24 @ 04:49)    cefepime   IVPB   100 mL/Hr IV Intermittent (02-09-24 @ 05:54)    meropenem  IVPB   100 mL/Hr IV Intermittent (02-09-24 @ 10:45)    vancomycin  IVPB   250 mL/Hr IV Intermittent (02-09-24 @ 05:53)    vancomycin  IVPB   166.67 mL/Hr IV Intermittent (02-08-24 @ 19:04)        OTHER MEDS:   MEDICATIONS  (STANDING):  allopurinol 300 daily  norepinephrine Infusion 0.05 <Continuous>  phenylephrine    Infusion 0.5 <Continuous>  vasopressin Infusion 0.02 <Continuous>      VITALS:  Vital Signs Last 24 Hrs  T(F): 97.3 (02-09-24 @ 12:35), Max: 104.7 (02-08-24 @ 21:30)    Vital Signs Last 24 Hrs  HR: 102 (02-09-24 @ 12:35) (87 - 137)  BP: 94/57 (02-09-24 @ 12:35) (66/38 - 125/55)  RR: 18 (02-09-24 @ 12:35)  SpO2: 100% (02-09-24 @ 12:35) (82% - 100%)  Wt(kg): --    EXAM:  GENERAL: In distress. On Bipap. Lips dry.   HEAD: No head lesions  NECK: Supple, nontender to palpation; no JVD  CHEST/LUNG: Shallow breath sounds. Right sided chest wall port c/d/i  HEART: S1 S2  ABDOMEN: Soft, nontender, nondistended  EXTREMITIES: No clubbing  NERVOUS SYSTEM: Alert and oriented to person  MSK: No joint erythema, swelling or pain  SKIN: No rashes or lesions, no superficial thrombophlebitis    Labs:                        7.6    0.40  )-----------( 108      ( 09 Feb 2024 06:16 )             25.1     02-09    142  |  102  |  30<H>  ----------------------------<  180<H>  4.5   |  10<L>  |  1.5    Ca    7.5<L>      09 Feb 2024 11:00  Phos  6.8     02-09  Mg     1.7     02-09    TPro  4.5<L>  /  Alb  2.8<L>  /  TBili  6.4<H>  /  DBili  x   /  AST  119<H>  /  ALT  54<H>  /  AlkPhos  44  02-09      WBC Trend:  WBC Count: 0.40 (02-09-24 @ 06:16)  WBC Count: 0.08 (02-08-24 @ 18:10)      Auto Neutrophil #: 0.01 K/uL (02-08-24 @ 18:10)  Band Neutrophils %: 4.0 % (02-08-24 @ 18:10)      Creatine Trend:  Creatinine: 1.5 (02-09)  Creatinine: 1.8 (02-09)  Creatinine: 1.0 (02-08)      Liver Biochemical Testing Trend:  Alanine Aminotransferase (ALT/SGPT): 54 *H* (02-09)  Alanine Aminotransferase (ALT/SGPT): 12 (02-08)  Aspartate Aminotransferase (AST/SGOT): 119 (02-09-24 @ 06:16)  Aspartate Aminotransferase (AST/SGOT): 9 (02-08-24 @ 18:10)  Bilirubin Total: 6.4 (02-09)  Bilirubin Total: 2.2 (02-08)      Trend LDH  02-09-24 @ 06:16  361<H>      Auto Eosinophil %: 0.0 % (02-08-24 @ 18:10)      Urinalysis Basic - ( 09 Feb 2024 11:00 )    Color: x / Appearance: x / SG: x / pH: x  Gluc: 180 mg/dL / Ketone: x  / Bili: x / Urobili: x   Blood: x / Protein: x / Nitrite: x   Leuk Esterase: x / RBC: x / WBC x   Sq Epi: x / Non Sq Epi: x / Bacteria: x    MICROBIOLOGY:    Female    Culture - Blood (collected 08 Feb 2024 18:10)  Source: .Blood Blood-Peripheral  Gram Stain (09 Feb 2024 09:46):    Growth in aerobic bottle: Gram Negative Rods  Preliminary Report (09 Feb 2024 09:46):    Growth in aerobic bottle: Gram Negative Rods    Direct identification is available within approximately 3-5    hours either by Blood Panel Multiplexed PCR or Direct    MALDI-TOF. Details: [email or web address]  Organism: Blood Culture PCR (09 Feb 2024 11:15)  Organism: Blood Culture PCR (09 Feb 2024 11:15)      Method Type: PCR      -  Pseudomonas aeruginosa: Detec    Culture - Blood (collected 08 Feb 2024 18:10)  Source: .Blood Blood-Peripheral  Gram Stain (09 Feb 2024 10:39):    Growth in aerobic bottle: Gram Negative Rods  Preliminary Report (09 Feb 2024 10:39):    Growth in aerobic bottle: Gram Negative Rods      Lactate Dehydrogenase, Serum: 361 (02-09)    Troponin T, High Sensitivity Result: 13 (02-08)    Blood Gas Arterial, Lactate: 15.4 (02-09 @ 12:10)  Lactate, Blood: 17.9 (02-09 @ 11:00)  Blood Gas Arterial, Lactate: 15.1 (02-09 @ 08:54)  Lactate, Blood: 17.2 (02-09 @ 06:16)        INFLAMMATORY MARKERS      RADIOLOGY & ADDITIONAL TESTS:  I have personally reviewed the imagings.  CXR  Xray Chest 1 View- PORTABLE-Urgent:   ACC: [id number] EXAM:  XR CHEST PORTABLE URGENT 1V   ORDERED BY: WENDY SINHA     PROCEDURE DATE:  02/09/2024          INTERPRETATION:  Clinical History / Reason for exam: Shortness of breath.    Comparison : Chest radiograph 2/8/2024.    Technique/Positioning: Frontal chest radiograph.    Findings:    Support devices: Stable right chest port.    Cardiac/mediastinum/hilum: Unchanged.    Lung parenchyma/Pleura: Improving bilateral opacities. No pneumothorax.    Skeleton/soft tissues: Unchanged.    Impression:    Improving bilateral opacities.        --- End of Report ---            ALVARO ALANIS MD; Attending Radiologist  This document has been electronically signed. Feb 9 2024 11:57AM (02-09-24 @ 08:55)      CT  CT Abdomen and Pelvis w/ IV Cont:   ACC: [id number] EXAM:  CT ABDOMEN AND PELVIS IC   ORDERED BY: BRITNI ORNELAS     PROCEDURE DATE:  02/08/2024          INTERPRETATION:  Reason for study: Sepsis unknown origin    TECHNIQUE: Multislice helical sections were obtained from the domes of   the diaphragm to the pubic symphysis after intravenous contrast   administration.    Multiplanar reformats in the sagittal and coronal planes were performed.    COMPARISON: CT abdomen pelvis 11/11/2022.    FINDINGS:    LOWER THORAX: Unremarkable    HEPATOBILIARY: Hepatic steatosis.. Unremarkable gallbladder    SPLEEN: Unremarkable.    PANCREAS: Unremarkable.    ADRENAL GLANDS: Unremarkable.    KIDNEYS: Symmetric renal enhancement. No hydronephrosis or calculi..    ABDOMINAL NODES: Unremarkable.    PERITONEUM/MESENTERY/BOWEL: No bowel obstruction, ascites or fluid   collections. Sigmoid colon diverticulosis.    PELVIC ORGANS: Unremarkable uterus, adnexa and urinary bladder    BONES/SOFT TISSUES: No focal osseous lesions..    OTHER: Normal caliber calcified dilated aorta and iliac vessels..    IMPRESSION:    Since . 11/11/2022    No current CT evidence of active abdominal or pelvic disease.    --- End of Report ---      AB HAMPTON MD; Attending Radiologist  This document has beenelectronically signed. Feb 8 2024 10:05PM (02-08-24 @ 21:13)    CARDIOLOGY TESTING  12 Lead ECG:   Ventricular Rate 134 BPM    Atrial Rate 134 BPM    P-R Interval 138 ms    QRS Duration 78 ms    Q-T Interval 268 ms    QTC Calculation(Bazett) 400 ms    P Axis 25 degrees    R Axis 23 degrees    T Axis 54 degrees    Diagnosis Line Sinus tachycardia  Otherwise normal ECG    Confirmed by MIRIAM MERCER MD ([id number]) on 2/8/2024 7:05:51 PM (02-08-24 @ 18:17)             MARAL COURTNEY  78y, Female  Allergies    No Known Allergies    Intolerances      LOS  1d    HPI  HPI:  chemo followed by Dr. Marsh presenting to the ED for evaluation of weakness and shortness of breath since earlier today. Daughter states that she was seen by Dr. Marsh, had an "injection to increase her blood count," received two liters of fluids. Shortly afterwards patient had increased work of breathing and began to feel weak. Otherwise denies any chills, headache, changes in vision, cough, congestion, cp, palpitations, n/v/d, abd pain, constipation, urinary complaints.   (09 Feb 2024 00:08)      INFECTIOUS DISEASE HISTORY:  Hospital course-  ID consulted for antimicrobial recommendations.     Prior hospital charts reviewed [Yes]  Primary team notes reviewed [Yes]  Other consultant notes reviewed [Yes]    REVIEW OF SYSTEMS:  CONSTITUTIONAL: No fever or chills  HEENT: No sore throat  RESPIRATORY: No cough, no shortness of breath  CARDIOVASCULAR: No chest pain or palpitations  GASTROINTESTINAL: No abdominal or epigastric pain  GENITOURINARY: No dysuria  NEUROLOGICAL: No headache/dizziness  MSK: No joint pain, erythema, or swelling; no back pain  SKIN: No itching, rashes  All other ROS negative except noted above    PAST MEDICAL & SURGICAL HISTORY:  AML (acute myeloid leukemia)  on chemotx      Brain aneurysm  S/P coiling      S/P cerebral aneurysm repair  coiling      SOCIAL HISTORY:  - No recent travel    FAMILY HISTORY:  FH: heart disease (Mother)    ANTIMICROBIALS:  meropenem  IVPB    meropenem  IVPB 1000 every 8 hours  vancomycin  IVPB 1000 every 12 hours      ANTIMICROBIALS (past 90 days):  MEDICATIONS  (STANDING):    cefepime   IVPB   100 mL/Hr IV Intermittent (02-08-24 @ 19:04)    cefepime   IVPB   100 mL/Hr IV Intermittent (02-09-24 @ 04:49)    cefepime   IVPB   100 mL/Hr IV Intermittent (02-09-24 @ 05:54)    meropenem  IVPB   100 mL/Hr IV Intermittent (02-09-24 @ 10:45)    vancomycin  IVPB   250 mL/Hr IV Intermittent (02-09-24 @ 05:53)    vancomycin  IVPB   166.67 mL/Hr IV Intermittent (02-08-24 @ 19:04)        OTHER MEDS:   MEDICATIONS  (STANDING):  allopurinol 300 daily  norepinephrine Infusion 0.05 <Continuous>  phenylephrine    Infusion 0.5 <Continuous>  vasopressin Infusion 0.02 <Continuous>      VITALS:  Vital Signs Last 24 Hrs  T(F): 97.3 (02-09-24 @ 12:35), Max: 104.7 (02-08-24 @ 21:30)    Vital Signs Last 24 Hrs  HR: 102 (02-09-24 @ 12:35) (87 - 137)  BP: 94/57 (02-09-24 @ 12:35) (66/38 - 125/55)  RR: 18 (02-09-24 @ 12:35)  SpO2: 100% (02-09-24 @ 12:35) (82% - 100%)  Wt(kg): --    EXAM:  GENERAL: In distress. On Bipap. Lips dry.   HEAD: No head lesions  NECK: Supple, nontender to palpation; no JVD  CHEST/LUNG: Shallow breath sounds. Right sided chest wall port c/d/i  HEART: S1 S2  ABDOMEN: Soft, nontender, nondistended  EXTREMITIES: No clubbing  NERVOUS SYSTEM: Alert and oriented to person  MSK: No joint erythema, swelling or pain  SKIN: No rashes or lesions, no superficial thrombophlebitis    Labs:                        7.6    0.40  )-----------( 108      ( 09 Feb 2024 06:16 )             25.1     02-09    142  |  102  |  30<H>  ----------------------------<  180<H>  4.5   |  10<L>  |  1.5    Ca    7.5<L>      09 Feb 2024 11:00  Phos  6.8     02-09  Mg     1.7     02-09    TPro  4.5<L>  /  Alb  2.8<L>  /  TBili  6.4<H>  /  DBili  x   /  AST  119<H>  /  ALT  54<H>  /  AlkPhos  44  02-09      WBC Trend:  WBC Count: 0.40 (02-09-24 @ 06:16)  WBC Count: 0.08 (02-08-24 @ 18:10)      Auto Neutrophil #: 0.01 K/uL (02-08-24 @ 18:10)  Band Neutrophils %: 4.0 % (02-08-24 @ 18:10)      Creatine Trend:  Creatinine: 1.5 (02-09)  Creatinine: 1.8 (02-09)  Creatinine: 1.0 (02-08)      Liver Biochemical Testing Trend:  Alanine Aminotransferase (ALT/SGPT): 54 *H* (02-09)  Alanine Aminotransferase (ALT/SGPT): 12 (02-08)  Aspartate Aminotransferase (AST/SGOT): 119 (02-09-24 @ 06:16)  Aspartate Aminotransferase (AST/SGOT): 9 (02-08-24 @ 18:10)  Bilirubin Total: 6.4 (02-09)  Bilirubin Total: 2.2 (02-08)      Trend LDH  02-09-24 @ 06:16  361<H>      Auto Eosinophil %: 0.0 % (02-08-24 @ 18:10)      Urinalysis Basic - ( 09 Feb 2024 11:00 )    Color: x / Appearance: x / SG: x / pH: x  Gluc: 180 mg/dL / Ketone: x  / Bili: x / Urobili: x   Blood: x / Protein: x / Nitrite: x   Leuk Esterase: x / RBC: x / WBC x   Sq Epi: x / Non Sq Epi: x / Bacteria: x    MICROBIOLOGY:    Female    Culture - Blood (collected 08 Feb 2024 18:10)  Source: .Blood Blood-Peripheral  Gram Stain (09 Feb 2024 09:46):    Growth in aerobic bottle: Gram Negative Rods  Preliminary Report (09 Feb 2024 09:46):    Growth in aerobic bottle: Gram Negative Rods    Direct identification is available within approximately 3-5    hours either by Blood Panel Multiplexed PCR or Direct    MALDI-TOF. Details: [email or web address]  Organism: Blood Culture PCR (09 Feb 2024 11:15)  Organism: Blood Culture PCR (09 Feb 2024 11:15)      Method Type: PCR      -  Pseudomonas aeruginosa: Detec    Culture - Blood (collected 08 Feb 2024 18:10)  Source: .Blood Blood-Peripheral  Gram Stain (09 Feb 2024 10:39):    Growth in aerobic bottle: Gram Negative Rods  Preliminary Report (09 Feb 2024 10:39):    Growth in aerobic bottle: Gram Negative Rods      Lactate Dehydrogenase, Serum: 361 (02-09)    Troponin T, High Sensitivity Result: 13 (02-08)    Blood Gas Arterial, Lactate: 15.4 (02-09 @ 12:10)  Lactate, Blood: 17.9 (02-09 @ 11:00)  Blood Gas Arterial, Lactate: 15.1 (02-09 @ 08:54)  Lactate, Blood: 17.2 (02-09 @ 06:16)        INFLAMMATORY MARKERS      RADIOLOGY & ADDITIONAL TESTS:  I have personally reviewed the imagings.  CXR  Xray Chest 1 View- PORTABLE-Urgent:   ACC: [id number] EXAM:  XR CHEST PORTABLE URGENT 1V   ORDERED BY: WENDY SINHA     PROCEDURE DATE:  02/09/2024          INTERPRETATION:  Clinical History / Reason for exam: Shortness of breath.    Comparison : Chest radiograph 2/8/2024.    Technique/Positioning: Frontal chest radiograph.    Findings:    Support devices: Stable right chest port.    Cardiac/mediastinum/hilum: Unchanged.    Lung parenchyma/Pleura: Improving bilateral opacities. No pneumothorax.    Skeleton/soft tissues: Unchanged.    Impression:    Improving bilateral opacities.        --- End of Report ---            ALVARO ALANIS MD; Attending Radiologist  This document has been electronically signed. Feb 9 2024 11:57AM (02-09-24 @ 08:55)      CT  CT Abdomen and Pelvis w/ IV Cont:   ACC: [id number] EXAM:  CT ABDOMEN AND PELVIS IC   ORDERED BY: BRITNI ORNELAS     PROCEDURE DATE:  02/08/2024          INTERPRETATION:  Reason for study: Sepsis unknown origin    TECHNIQUE: Multislice helical sections were obtained from the domes of   the diaphragm to the pubic symphysis after intravenous contrast   administration.    Multiplanar reformats in the sagittal and coronal planes were performed.    COMPARISON: CT abdomen pelvis 11/11/2022.    FINDINGS:    LOWER THORAX: Unremarkable    HEPATOBILIARY: Hepatic steatosis.. Unremarkable gallbladder    SPLEEN: Unremarkable.    PANCREAS: Unremarkable.    ADRENAL GLANDS: Unremarkable.    KIDNEYS: Symmetric renal enhancement. No hydronephrosis or calculi..    ABDOMINAL NODES: Unremarkable.    PERITONEUM/MESENTERY/BOWEL: No bowel obstruction, ascites or fluid   collections. Sigmoid colon diverticulosis.    PELVIC ORGANS: Unremarkable uterus, adnexa and urinary bladder    BONES/SOFT TISSUES: No focal osseous lesions..    OTHER: Normal caliber calcified dilated aorta and iliac vessels..    IMPRESSION:    Since . 11/11/2022    No current CT evidence of active abdominal or pelvic disease.    --- End of Report ---      AB HAMPTON MD; Attending Radiologist  This document has beenelectronically signed. Feb 8 2024 10:05PM (02-08-24 @ 21:13)    CARDIOLOGY TESTING  12 Lead ECG:   Ventricular Rate 134 BPM    Atrial Rate 134 BPM    P-R Interval 138 ms    QRS Duration 78 ms    Q-T Interval 268 ms    QTC Calculation(Bazett) 400 ms    P Axis 25 degrees    R Axis 23 degrees    T Axis 54 degrees    Diagnosis Line Sinus tachycardia  Otherwise normal ECG    Confirmed by MIRIAM MERCER MD ([id number]) on 2/8/2024 7:05:51 PM (02-08-24 @ 18:17)      < from: TTE Echo Complete w/o Contrast w/ Doppler (02.09.24 @ 08:08) >  Summary:   1. LV Ejection Fraction by Harper's Method with a biplane EF of 25 %.   2. Normal left atrial size.   3. Moderate mitral valve regurgitation.   4. Structurally normal mitral valve, with normal leaflet excursion.   5. Mild to moderate aortic regurgitation.   6. Normal trileaflet aortic valve with normal opening.    < end of copied text >

## 2024-02-09 NOTE — CONSULT NOTE ADULT - SUBJECTIVE AND OBJECTIVE BOX
NEPHROLOGY CONSULTATION NOTE    MARAL COURTNEY  78y  Female  MRN-[id number]    CC:   Patient is a 78y old  Female who presents with a chief complaint of Neutropenic fever (09 Feb 2024 14:01)      HPI:  chemo followed by Dr. Marsh presenting to the ED for evaluation of weakness and shortness of breath since earlier today. Daughter states that she was seen by Dr. Marsh, had an "injection to increase her blood count," received two liters of fluids. Shortly afterwards patient had increased work of breathing and began to feel weak. Otherwise denies any chills, headache, changes in vision, cough, congestion, cp, palpitations, n/v/d, abd pain, constipation, urinary complaints.   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      Brain aneurysm  S/P coiling      S/P cerebral aneurysm repair  coiling        Allergies:  No Known Allergies    Home Medications Reviewed  Hospital Medications:   MEDICATIONS  (STANDING):  allopurinol 300 milliGRAM(s) Oral daily  amikacin  IVPB 1000 milliGRAM(s) IV Intermittent once  meropenem  IVPB 2000 milliGRAM(s) IV Intermittent every 12 hours  norepinephrine Infusion 0.05 MICROgram(s)/kG/Min (6.89 mL/Hr) IV Continuous <Continuous>  phenylephrine    Infusion 0.5 MICROgram(s)/kG/Min (6.89 mL/Hr) IV Continuous <Continuous>  sodium bicarbonate  Infusion 0.202 mEq/kG/Hr (100 mL/Hr) IV Continuous <Continuous>  vancomycin  IVPB 1000 milliGRAM(s) IV Intermittent every 12 hours  vasopressin Infusion 0.02 Unit(s)/Min (3 mL/Hr) IV Continuous <Continuous>    MEDICATIONS  (PRN):    Home Medications:  acyclovir 400 mg oral tablet: 1 tab(s) orally once a day (11 Nov 2022 20:53)  allopurinol 300 mg oral tablet: 1 tab(s) orally once a day (11 Nov 2022 20:53)  potassium chloride 20 mEq oral tablet, extended release: 1 tab(s) orally once a day (11 Nov 2022 20:53)      SOCIAL HISTORY:  Social History:  Live with family   No smoking   No ETOH use (09 Feb 2024 00:08)      FAMILY HISTORY:  FH: heart disease (Mother)        REVIEW OF SYSTEMS:  All other review of systems is negative unless indicated above.    VITALS:  T(F): 97.3 (02-09-24 @ 12:35), Max: 104.7 (02-08-24 @ 21:30)  HR: 102 (02-09-24 @ 12:35)  BP: 94/57 (02-09-24 @ 12:35)  RR: 18 (02-09-24 @ 12:35)  SpO2: 100% (02-09-24 @ 12:35)    Height (cm): 162.6 (02-09 @ 01:00)  Weight (kg): 74.2 (02-09 @ 01:00)  BMI (kg/m2): 28.1 (02-09 @ 01:00)  BSA (m2): 1.8 (02-09 @ 01:00)  I&O's Detail    08 Feb 2024 07:01  -  09 Feb 2024 07:00  --------------------------------------------------------  IN:    IV PiggyBack: 450 mL    Phenylephrine: 1112 mL    Sodium Chloride 0.9% Bolus: 500 mL  Total IN: 2062 mL    OUT:    Indwelling Catheter - Urethral (mL): 390 mL  Total OUT: 390 mL    Total NET: 1672 mL      09 Feb 2024 07:01  -  09 Feb 2024 15:27  --------------------------------------------------------  IN:    Phenylephrine: 556 mL    Sodium Bicarbonate: 200 mL    Vasopressin: 3 mL  Total IN: 759 mL    OUT:    Indwelling Catheter - Urethral (mL): 1125 mL    Voided (mL): 150 mL  Total OUT: 1275 mL    Total NET: -516 mL            I&O's Summary    08 Feb 2024 07:01  -  09 Feb 2024 07:00  --------------------------------------------------------  IN: 2062 mL / OUT: 390 mL / NET: 1672 mL    09 Feb 2024 07:01  -  09 Feb 2024 15:27  --------------------------------------------------------  IN: 759 mL / OUT: 1275 mL / NET: -516 mL        PHYSICAL EXAM:  Gen: NAD on bipap  resp: b/l breath sounds  ]abd: soft  ext: no edema    LABS:  Daily Height in cm: 162.56 (09 Feb 2024 01:00)    Daily   ABG - ( 09 Feb 2024 12:10 )  pH, Arterial: 7.27  pH, Blood: x     /  pCO2: 19    /  pO2: 231   / HCO3: 9     / Base Excess: -16.3 /  SaO2: 99.7        Blood Gas Arterial, Lactate: 15.4 mmol/L (02-09-24 @ 12:10)  Blood Gas Arterial - Potassium: 3.5 mmol/L (02-09-24 @ 12:10)  Blood Gas Arterial - Calcium, Ionized: 0.99 mmol/L (02.09.24 @ 12:10)      Lactate, Blood: 17.9 mmol/L (02-09-24 @ 11:00)    02-09    142  |  102  |  30<H>  ----------------------------<  180<H>  4.5   |  10<L>  |  1.5    Ca    7.5<L>      09 Feb 2024 11:00  Phos  6.8     02-09  Mg     1.7     02-09    TPro  4.5<L>  /  Alb  2.8<L>  /  TBili  6.4<H>  /  DBili      /  AST  119<H>  /  ALT  54<H>  /  AlkPhos  44  02-09      Creatinine Trend:   Creatinine: 1.5 mg/dL (02-09-24 @ 11:00)  Creatinine: 1.8 mg/dL (02-09-24 @ 06:16)  Creatinine: 1.0 mg/dL (02-08-24 @ 18:10)  Creatinine, Serum: 0.6 mg/dL (11-21-22 @ 07:54)                            7.6    0.40  )-----------( 108      ( 09 Feb 2024 06:16 )             25.1     Mean Cell Volume: 102.9 fL (02-09-24 @ 06:16)    Urine Studies:  Protein, Urine: 100 mg/dL (02-08-24 @ 19:23)  White Blood Cell - Urine: 1 /HPF (02-08-24 @ 19:23)  Red Blood Cell - Urine: 1 /HPF (02-08-24 @ 19:23)  Protein, Urine: 100 mg/dL (11-11-22 @ 17:10)  White Blood Cell - Urine: 6-10 /HPF (11-11-22 @ 17:10)  Red Blood Cell - Urine: 3-5 /HPF (11-11-22 @ 17:10)              RADIOLOGY & ADDITIONAL STUDIES:        Xray Chest 1 View- PORTABLE-Urgent:   ACC: [id number] EXAM:  XR CHEST PORTABLE URGENT 1V   ORDERED BY: WENDY SINHA     PROCEDURE DATE:  02/09/2024          INTERPRETATION:  Clinical History / Reason for exam: Shortness of breath.    Comparison : Chest radiograph 2/8/2024.    Technique/Positioning: Frontal chest radiograph.    Findings:    Support devices: Stable right chest port.    Cardiac/mediastinum/hilum: Unchanged.    Lung parenchyma/Pleura: Improving bilateral opacities. No pneumothorax.    Skeleton/soft tissues: Unchanged.    Impression:    Improving bilateral opacities.        --- End of Report ---            ALVARO ALANIS MD; Attending Radiologist  This document has been electronically signed. Feb 9 2024 11:57AM (02-09-24 @ 08:55)

## 2024-02-09 NOTE — CONSULT NOTE ADULT - TIME BILLING
Initial visit in the CCU of a patient well known to me consultation as requested by CCU attending Physician. Examined the patient. reviewed the charts. Coordinated care.
I have personally seen and examined this patient.    I have reviewed all pertinent clinical information and reviewed all relevant imaging and diagnostic studies personally.   I discussed recommendations with the primary team.
Home

## 2024-02-09 NOTE — DIETITIAN INITIAL EVALUATION ADULT - PERTINENT LABORATORY DATA
02-09    142  |  102  |  30<H>  ----------------------------<  180<H>  4.5   |  10<L>  |  1.5    Ca    7.5<L>      09 Feb 2024 11:00  Phos  6.8     02-09  Mg     1.7     02-09    TPro  4.5<L>  /  Alb  2.8<L>  /  TBili  6.4<H>  /  DBili  x   /  AST  119<H>  /  ALT  54<H>  /  AlkPhos  44  02-09                          7.6    0.40  )-----------( 108      ( 09 Feb 2024 06:16 )             25.1

## 2024-02-09 NOTE — H&P ADULT - ASSESSMENT
A/P:     1/ Neutropenic fever   - cover with broad spectrum antibiotics   - fu all cx  - imaging reviewed  - Heme/onc fu in am   - transfused 1 unit PRBC  - monitor for bleeding   - send LDH / haptoglobin / fibrinogen in am   - c/w pressors keep MAP above 65   - c/w BIPAP for now  - repeat VBG   - serial lactate   - DVT ppx    Admit to MICU     Time 75 min

## 2024-02-09 NOTE — DIETITIAN INITIAL EVALUATION ADULT - ORAL INTAKE PTA/DIET HISTORY
pt presently on NIPPV, limited interview-pt consumes a regular diet PTA with good po intake, denies any food allergies/intolerances.   as per EMR review weight is stable    presently NPO 2/2 respiratory function

## 2024-02-09 NOTE — H&P ADULT - HISTORY OF PRESENT ILLNESS
chemo followed by Dr. Marsh presenting to the ED for evaluation of weakness and shortness of breath since earlier today. Daughter states that she was seen by Dr. Marsh, had an "injection to increase her blood count," received two liters of fluids. Shortly afterwards patient had increased work of breathing and began to feel weak. Otherwise denies any chills, headache, changes in vision, cough, congestion, cp, palpitations, n/v/d, abd pain, constipation, urinary complaints.

## 2024-02-09 NOTE — DIETITIAN INITIAL EVALUATION ADULT - OTHER INFO
pt is 78 year old female with hx of AML on chemo, brain aneurysm s/p coiling, pt admitted with septic shock and neutropenic fever.

## 2024-02-09 NOTE — DIETITIAN INITIAL EVALUATION ADULT - DIET TYPE
once respiratory fxn improves and pt can tolerate po diet recommend regular diet no fresh fruits or vegetables 2/2 neutropenia  pt can benefit from additional high protein supplementation, add magic cup with meals (290 kcals, 9g protein)/NPO

## 2024-02-09 NOTE — CONSULT NOTE ADULT - ASSESSMENT
77 y/o f w/ pmh AML on chemo presenting to the ED for evaluation of weakness and shortness of breath x 1day. Her w/u consistent with septic shock requiring pressors. TTE showed reduced global LV systolic function EF 25%; moderate MR    Plan  - currently on awa and vaso   - ecg sinus tachycardia non-ischemic,  - no events on tele,   - cardiac enzyme WNL  - Pt with no recent ischemic w/u can consider nuclear stress test when stable  - will consider starting on BB/Entresto when off pressors and BP improves  - c/w abx, follow cultures  - oncology follow-up  - Monitor Lytes   77 y/o f w/ pmh AML on chemo presenting to the ED for evaluation of weakness and shortness of breath x 1day. Her w/u consistent with septic shock requiring pressors. TTE showed reduced global LV systolic function EF 25%; moderate MR    Plan  - currently on awa and vaso   - ecg sinus tachycardia non-ischemic,  - no events on tele,   - cardiac enzyme WNL  - will consider starting on BB/Entresto when off pressors and BP improves  - c/w abx, follow cultures  - oncology follow-up  - Monitor Flex

## 2024-02-09 NOTE — PROGRESS NOTE ADULT - SUBJECTIVE AND OBJECTIVE BOX
Patient is a 78y old  Female who presents with a chief complaint of Neutropenic fever (09 Feb 2024 08:34)      T(F): 101.4 (02-09-24 @ 05:01), Max: 104.7 (02-08-24 @ 21:30)  HR: 110 (02-09-24 @ 06:30)  BP: 90/61 (02-09-24 @ 06:30)  RR: 51 (02-09-24 @ 06:30)  SpO2: 100% (02-09-24 @ 06:30) (82% - 100%)    PHYSICAL EXAM:  GENERAL: NAD  HEAD:  Atraumatic, Normocephalic  EYES: EOMI, PERRLA, conjunctiva and sclera clear  NERVOUS SYSTEM:  Alert & Oriented X3, no focal deficits   CHEST/LUNG:  bilateral rhonchi  HEART: Regular rate and rhythm; No murmurs, rubs, or gallops  ABDOMEN: Soft, Nontender, Nondistended; Bowel sounds present  EXTREMITIES:  2+ Peripheral Pulses, No clubbing, cyanosis, or edema    LABS  02-09    139  |  103  |  31<H>  ----------------------------<  193<H>  4.5   |  7<LL>  |  1.8<H>    Ca    7.6<L>      09 Feb 2024 06:16  Phos  6.8     02-09  Mg     2.0     02-09    TPro  4.5<L>  /  Alb  2.8<L>  /  TBili  6.4<H>  /  DBili  x   /  AST  119<H>  /  ALT  54<H>  /  AlkPhos  44  02-09                          7.6    0.40  )-----------( 108      ( 09 Feb 2024 06:16 )             25.1     PT/INR - ( 08 Feb 2024 18:10 )   PT: 18.10 sec;   INR: 1.58 ratio         PTT - ( 08 Feb 2024 18:10 )  PTT:38.3 sec    CARDIAC ENZYMES  Troponin T, High Sensitivity (02.08.24 @ 18:10)   Troponin T, High Sensitivity Result: 13    < from: 12 Lead ECG (02.08.24 @ 18:17) >  Diagnosis Line Sinus tachycardia  Otherwise normal ECG    < end of copied text >    RADIOLOGY  < from: CT Abdomen and Pelvis w/ IV Cont (02.08.24 @ 21:13) >  IMPRESSION:    Since . 11/11/2022    No current CT evidence of active abdominal or pelvic disease.    < end of copied text >    MEDICATIONS  (STANDING):  allopurinol 300 milliGRAM(s) Oral daily  cefepime   IVPB 1000 milliGRAM(s) IV Intermittent every 12 hours  lactated ringers. 1000 milliLiter(s) (100 mL/Hr) IV Continuous <Continuous>  norepinephrine Infusion 0.05 MICROgram(s)/kG/Min (6.89 mL/Hr) IV Continuous <Continuous>  phenylephrine    Infusion 0.5 MICROgram(s)/kG/Min (6.89 mL/Hr) IV Continuous <Continuous>  sodium bicarbonate  Injectable 50 milliEquivalent(s) IV Push every 5 minutes  vancomycin  IVPB 1000 milliGRAM(s) IV Intermittent every 12 hours    MEDICATIONS  (PRN):

## 2024-02-09 NOTE — CONSULT NOTE ADULT - ASSESSMENT
ASSESSMENT  This is a 78 year old female with PMH of AML on chemo (follows with oncologist Dr. Marsh) is brought in for acute hypoxic respiratory failure.     IMPRESSION  #Pseudomonas pneumonia-Life threatening.   #Neutropenic fever with septic shock  #Acute hypo  #Lactic acidosis  #AML on chemo   #Obesity BMI (kg/m2): 28.1      RECOMMENDATIONS  This is an incomplete consult note. All final recommendations to follow after interview and examination of the patient. Please follow recommendations noted below.    If any questions, please send a message or call on [employer] Teams  Please continue to update ID with any pertinent new laboratory or radiographic findings.    Sadia Rodríguez M.D  Infectious Diseases Attending/   [employer] at [facility]   ASSESSMENT  This is a 78 year old female with PMH of AML on chemo (follows with oncologist Dr. Marsh) is brought in for acute hypoxic respiratory failure.     IMPRESSION  #Pseudomonas pneumonia-Life threatening.   Unclear source- Possible UTI vs from the chest wall port?  #Neutropenic fever with septic shock  #Heart failure with reduced EF  #Acute hypoxic respiratory failure (CT chest not showing pneumonia   #Lactic acidosis  #DEBORAH over CKD  #AML on chemo- Need to clarify what chemo is she on. (gets monthly chemo, last one 3 weeks ago)  CMV and EBV IgG positive  Reported to be on Acyclovir and levofloxacin PPX at home.   #Obesity BMI (kg/m2): 28.1    RECOMMENDATIONS  -Recommend obtaining sputum cultures. Check MRSA nares.   -Follow up with blood cultures susceptibilities. Follow up with urine cultures.  -CT abdomen overall negative for significant findings except for hepatic steatosis. Should consider CT chest when able.   -Check serum galactomannan and fungitell.   -For now reasonable to continue with IV vancomycin. Keep in on the board for 24 hours, and if cultures remain negative for gram positive organism, D/C it.  -Will give a dose of IV amikacin today due to severe septic shock.  -Continue with meropenem, make it 2 gram q 12 hours extended infusion.   -Appreciate pharmacy assistance in dosing of the antimicrobials.   -Can resume acyclovir PPX when able.   -Off loading, aspiration precautions.   -Guarded prognosis.     If any questions, please send a message or call on [employer] Teams  Please continue to update ID with any pertinent new laboratory or radiographic findings.    Sadia Rodríguez M.D  Infectious Diseases Attending/   [employer] at [facility]   ASSESSMENT  This is a 78 year old female with PMH of AML on chemo (follows with oncologist Dr. Marsh) is brought in for acute hypoxic respiratory failure.     IMPRESSION  #Pseudomonas pneumonia-Life threatening.   Unclear source- Possible UTI vs from the chest wall port?  #Neutropenic fever with septic shock  #Pancytopenia. ANC 0.01 and ALC 0.07 on admission.   #Heart failure with reduced EF  #Acute hypoxic respiratory failure (CT chest not showing pneumonia   #Lactic acidosis  #DEBORAH over CKD  #AML on chemo- Need to clarify what chemo is she on. (gets monthly chemo, last one 3 weeks ago)  CMV and EBV IgG positive  Reported to be on Acyclovir and levofloxacin PPX at home.   #Obesity BMI (kg/m2): 28.1    RECOMMENDATIONS  -Recommend obtaining sputum cultures. Check MRSA nares.   -Follow up with blood cultures susceptibilities. Follow up with urine cultures.  -CT abdomen overall negative for significant findings except for hepatic steatosis. Should consider CT chest when able.   -Check serum galactomannan and fungitell.   -For now reasonable to continue with IV vancomycin. Keep in on the board for 24 hours, and if cultures remain negative for gram positive organism, D/C it.  -Will give a dose of IV amikacin today due to severe septic shock.  -Continue with meropenem, make it 2 gram q 12 hours extended infusion.   -Appreciate pharmacy assistance in dosing of the antimicrobials.   -Can resume acyclovir PPX when able.   -Off loading, aspiration precautions.   -Guarded prognosis.     If any questions, please send a message or call on [employer] Teams  Please continue to update ID with any pertinent new laboratory or radiographic findings.    Sadia Rodríguez M.D  Infectious Diseases Attending/   [employer] at [facility]

## 2024-02-09 NOTE — CONSULT NOTE ADULT - NS ATTEND AMEND GEN_ALL_CORE FT
Patient was seen and examined. Therapy as outlined above. I would agree in starting entresto low dose if her BP remains stable. Hold for now given that her BP is 100/60 mmHg. Maintain supportive care. Continue CCU monitoring.

## 2024-02-09 NOTE — CONSULT NOTE ADULT - ATTENDING COMMENTS
patient seen and examined agree above note   bolus 500 cclR now   do cheetah if positive rebolus   start D5w with 3 amp bicarb 100cc/ hr   follow bicarb and PH   VBG in 2 hrs   follow lA   on awa start vaso target map 65   follow cmp Q6 hrs   monitor is and os   renal consult   follow CE   keep pox >92%   Id consult   merop and vanco   send fungetill   bld cx follow up patient seen and examined agree above note   bolus 500 cclR now   do cheetah if positive rebolus   start D5w with 3 amp bicarb 100cc/ hr   follow bicarb and PH   VBG in 2 hrs   follow lA   on awa start vaso target map 65   follow cmp Q6 hrs   monitor is and os   renal consult   follow CE   keep pox >92%   Id consult   merop and vanco   send fungetill   bld cx follow up  oncology consult

## 2024-02-09 NOTE — H&P ADULT - NSHPLABSRESULTS_GEN_ALL_CORE
labs  02-08    134<L>  |  99  |  24<H>  ----------------------------<  157<H>  4.0   |  23  |  1.0    Ca    8.4      08 Feb 2024 18:10    TPro  5.5<L>  /  Alb  3.3<L>  /  TBili  2.2<H>  /  DBili  x   /  AST  9   /  ALT  12  /  AlkPhos  66  02-08                          6.8    0.08  )-----------( 113      ( 08 Feb 2024 18:10 )             20.6         PT/INR - ( 08 Feb 2024 18:10 )   PT: 18.10 sec;   INR: 1.58 ratio         PTT - ( 08 Feb 2024 18:10 )  PTT:38.3 sec

## 2024-02-09 NOTE — DIETITIAN INITIAL EVALUATION ADULT - NS FNS DIET ORDER
Diet, NPO:   Except Medications     Special Instructions for Nursing:  Except Medications (02-09-24 @ 00:54)

## 2024-02-09 NOTE — H&P ADULT - NSHPREVIEWOFSYSTEMS_GEN_ALL_CORE
REVIEW OF SYSTEMS  General:	weak   Skin/Breast: no rashes 	  Ophthalmologic: no blurry vision 	  ENMT:	no thrush   Respiratory and Thorax: + dyspnea 	  Cardiovascular:	 no chest pain   Gastrointestinal:	 no diarrhea   Genitourinary:	no dysuria   Musculoskeletal:	+ wekaness  Neurological:	no hallucinations  Psychiatric:	no hallucinations

## 2024-02-09 NOTE — CONSULT NOTE ADULT - ASSESSMENT
DEBORAH / pre-renal iso hypotension (also received contrast, aminoglycoside) / CT noted, no hydro  non oliguric, urine output significantly improved with iv fluid bolus and creatinine is down-trending  Neutropenic fever with septic shock / pancytopenia  HAGMA / lactic acidosis  AML on chemo    - cont bicarb drip  - monitor ionized calcium level closely.  replete to 1 iv as needed  - phos noted c/f ?TLS / on allopurinol should be max 200mg, monitor phos, uric acid, K level closely  - strict i/o  - pressor support as needed  - abx per ID recs / dose for eGFR (vancomycin by level)  - hem/onc f/u  DEBORAH / pre-renal iso hypotension (also received contrast, aminoglycoside) / CT noted, no hydro  non oliguric, urine output significantly improved with iv fluid bolus and creatinine is down-trending  Neutropenic fever with septic shock / pancytopenia  HAGMA / lactic acidosis  AML on chemo    - cont bicarb drip  - monitor ionized calcium level closely.  replete to 1 iv as needed  - phos noted c/f ?TLS / on allopurinol should be max 200mg, monitor phos, uric acid, K level closely  - strict i/o  - monitor volume status closely.  TTE noted with EF 25%  - pressor support as needed  - abx per ID recs / dose for eGFR (vancomycin by level)  - hem/onc f/u

## 2024-02-09 NOTE — CONSULT NOTE ADULT - ASSESSMENT
IMPRESSION:    Septic shock on phenylephrine  Acute neutropenic fever  H/o AML on chemotherapy    PLAN:    CNS: Avoid depressants    HEENT: Oral care    PULMONARY: Trial NC. Goal oxygen 92-95%. HOB @ 45 degrees. Aspiration precautions     CARDIOVASCULAR: On phenylephrine. goal MAP >60. CHeetah. Trend lactate. Obtain echo.    GI: GI prophylaxis. Feeding. Bowel regimen     RENAL:  Follow up lytes.  Correct as needed    INFECTIOUS DISEASE: Follow up blood and urine cultures. Vancomycin and cefepime. ID consult    HEMATOLOGICAL:  DVT prophylaxis.    ENDOCRINE:  Follow up FS.  Insulin protocol if needed.    MUSCULOSKELETAL: Bed rest    ICU         IMPRESSION:    Septic shock on phenylephrine  Acute neutropenic fever  H/o AML on chemotherapy  ?? uti   Renu   metabolic acidosis     PLAN: see attestation note     CNS: Avoid depressants    HEENT: Oral care    PULMONARY: Trial NC. Goal oxygen 92-95%. HOB @ 45 degrees. Aspiration precautions     CARDIOVASCULAR: On phenylephrine. goal MAP >60. CHeetah. Trend lactate. Obtain echo.    GI: GI prophylaxis. Feeding. Bowel regimen     RENAL:  Follow up lytes.  Correct as needed    INFECTIOUS DISEASE: Follow up blood and urine cultures. Vancomycin and cefepime. ID consult    HEMATOLOGICAL:  DVT prophylaxis.    ENDOCRINE:  Follow up FS.  Insulin protocol if needed.    MUSCULOSKELETAL: Bed rest    ICU

## 2024-02-09 NOTE — CONSULT NOTE ADULT - SUBJECTIVE AND OBJECTIVE BOX
Patient is a 78y old  Female who presents with a chief complaint of Neutropenic fever (2024 00:08)      HPI:  chemo followed by Dr. Marsh presenting to the ED for evaluation of weakness and shortness of breath since earlier today. Daughter states that she was seen by Dr. Marsh, had an "injection to increase her blood count," received two liters of fluids. Shortly afterwards patient had increased work of breathing and began to feel weak. Otherwise denies any chills, headache, changes in vision, cough, congestion, cp, palpitations, n/v/d, abd pain, constipation, urinary complaints.   (2024 00:08)      PAST MEDICAL & SURGICAL HISTORY:  AML (acute myeloid leukemia)  on chemotx      Brain aneurysm  S/P coiling      S/P cerebral aneurysm repair  coiling              FAMILY HISTORY:  FH: heart disease (Mother)    :  No known cardiovacular family hisotry     Review Of Systems:     All ROS are negative except per HPI       Allergies    No Known Allergies    Intolerances          PHYSICAL EXAM    ICU Vital Signs Last 24 Hrs  T(C): 38.6 (2024 05:01), Max: 40.4 (2024 21:30)  T(F): 101.4 (2024 05:01), Max: 104.7 (2024 21:30)  HR: 110 (2024 06:30) (87 - 137)  BP: 90/61 (2024 06:30) (66/38 - 125/55)  BP(mean): 70 (2024 06:30) (52 - 78)    RR: 51 (2024 06:30) (24 - 51)  SpO2: 100% (2024 06:30) (82% - 100%)    O2 Parameters below as of 2024 05:01  Patient On (Oxygen Delivery Method): BiPAP/CPAP            CONSTITUTIONAL:   NAD    ENT:   Airway patent,       EYES:   pupils equal,   round and reactive to light.    CARDIAC:   Normal rate,   Regular rhythm.    Heart sounds S1, S2.       RESPIRATORY:   No wheezing   Normal chest expansion  No use of accessory muscles    GASTROINTESTINAL:  Abdomen soft   Non-tender,   No guarding,   + BS        MUSCULOSKELETAL:     Nno clubbing, cyanosis    NEUROLOGICAL:   Alert     SKIN:   Skin normal color for race,             24 @ 07:01  -   @ 07:00  --------------------------------------------------------  IN:    IV PiggyBack: 450 mL    Phenylephrine: 973 mL    Sodium Chloride 0.9% Bolus: 500 mL  Total IN: 1923 mL    OUT:    Indwelling Catheter - Urethral (mL): 290 mL  Total OUT: 290 mL    Total NET: 1633 mL          LABS:                          7.6    x     )-----------( 108      ( 2024 06:16 )             25.1                                               02-08    134<L>  |  99  |  24<H>  ----------------------------<  157<H>  4.0   |  23  |  1.0    Ca    8.4      2024 18:10    TPro  5.5<L>  /  Alb  3.3<L>  /  TBili  2.2<H>  /  DBili  x   /  AST  9   /  ALT  12  /  AlkPhos  66  02-08      PT/INR - ( 2024 18:10 )   PT: 18.10 sec;   INR: 1.58 ratio         PTT - ( 2024 18:10 )  PTT:38.3 sec                                       Urinalysis Basic - ( 2024 19:23 )    Color: Dark Yellow / Appearance: Turbid / S.019 / pH: x  Gluc: x / Ketone: Trace mg/dL  / Bili: Small / Urobili: 1.0 mg/dL   Blood: x / Protein: 100 mg/dL / Nitrite: Negative   Leuk Esterase: Negative / RBC: 1 /HPF / WBC 1 /HPF   Sq Epi: x / Non Sq Epi: x / Bacteria: Too Numerous to count /HPF                                                  LIVER FUNCTIONS - ( 2024 18:10 )  Alb: 3.3 g/dL / Pro: 5.5 g/dL / ALK PHOS: 66 U/L / ALT: 12 U/L / AST: 9 U/L / GGT: x                                                                                                                                       X-Rays reviewed:                                                                                    ECHO    CXR interpreted by me:    MEDICATIONS  (STANDING):  allopurinol 300 milliGRAM(s) Oral daily  cefepime   IVPB 1000 milliGRAM(s) IV Intermittent every 12 hours  norepinephrine Infusion 0.05 MICROgram(s)/kG/Min (6.89 mL/Hr) IV Continuous <Continuous>  phenylephrine    Infusion 0.5 MICROgram(s)/kG/Min (6.89 mL/Hr) IV Continuous <Continuous>  vancomycin  IVPB 1000 milliGRAM(s) IV Intermittent every 12 hours    MEDICATIONS  (PRN):         Patient is a 78y old  Female who presents with a chief complaint of Neutropenic fever (2024 00:08)      HPI:  chemo followed by Dr. Marsh presenting to the ED for evaluation of weakness and shortness of breath since earlier today. Daughter states that she was seen by Dr. Marsh, had an "injection to increase her blood count," received two liters of fluids. Shortly afterwards patient had increased work of breathing and began to feel weak. Otherwise denies any chills, headache, changes in vision, cough, congestion, cp, palpitations, n/v/d, abd pain, constipation, urinary complaints.   (2024 00:08)      PAST MEDICAL & SURGICAL HISTORY:  AML (acute myeloid leukemia)  on chemotx      Brain aneurysm  S/P coiling      S/P cerebral aneurysm repair  coiling              FAMILY HISTORY:  FH: heart disease (Mother)    :  No known cardiovacular family hisotry     Review Of Systems:     All ROS are negative except per HPI       Allergies    No Known Allergies    Intolerances          PHYSICAL EXAM    ICU Vital Signs Last 24 Hrs  T(C): 38.6 (2024 05:01), Max: 40.4 (2024 21:30)  T(F): 101.4 (2024 05:01), Max: 104.7 (2024 21:30)  HR: 110 (2024 06:30) (87 - 137)  BP: 90/61 (2024 06:30) (66/38 - 125/55)  BP(mean): 70 (2024 06:30) (52 - 78)    RR: 51 (2024 06:30) (24 - 51)  SpO2: 100% (2024 06:30) (82% - 100%)    O2 Parameters below as of 2024 05:01  Patient On (Oxygen Delivery Method): BiPAP/CPAP            CONSTITUTIONAL:   NAD    ENT:   Airway patent,       EYES:   pupils equal,   round and reactive to light.    CARDIAC:   Normal rate,   Regular rhythm.    Heart sounds S1, S2.       RESPIRATORY:   No wheezing   Normal chest expansion  No use of accessory muscles    GASTROINTESTINAL:  Abdomen soft   Non-tender,   No guarding,   + BS        MUSCULOSKELETAL:     Nno clubbing, cyanosis    NEUROLOGICAL:   Alert     SKIN:   Skin normal color for race,             24 @ 07:01  -   @ 07:00  --------------------------------------------------------  IN:    IV PiggyBack: 450 mL    Phenylephrine: 973 mL    Sodium Chloride 0.9% Bolus: 500 mL  Total IN: 1923 mL    OUT:    Indwelling Catheter - Urethral (mL): 290 mL  Total OUT: 290 mL    Total NET: 1633 mL          LABS:                          7.6    x     )-----------( 108      ( 2024 06:16 )             25.1                                               02-08    139<L>  |  103 |  31 <H>  ----------------------------<  157<H>  4.5   |  7  |  1.8  Ca    8.4      2024 18:10    TPro  5.5<L>  /  Alb  3.3<L>  /  TBili  2.2<H>  /  DBili  x   /  AST  9   /  ALT  12  /  AlkPhos  66  02-08      PT/INR - ( 2024 18:10 )   PT: 18.10 sec;   INR: 1.58 ratio         PTT - ( 2024 18:10 )  PTT:38.3 sec                                       Urinalysis Basic - ( 2024 19:23 )    Color: Dark Yellow / Appearance: Turbid / S.019 / pH: x  Gluc: x / Ketone: Trace mg/dL  / Bili: Small / Urobili: 1.0 mg/dL   Blood: x / Protein: 100 mg/dL / Nitrite: Negative   Leuk Esterase: Negative / RBC: 1 /HPF / WBC 1 /HPF   Sq Epi: x / Non Sq Epi: x / Bacteria: Too Numerous to count /HPF                                                  LIVER FUNCTIONS - ( 2024 18:10 )  Alb: 3.3 g/dL / Pro: 5.5 g/dL / ALK PHOS: 66 U/L / ALT: 12 U/L / AST: 9 U/L / GGT: x                                                                                                                                       X-Rays reviewed:                                                                                    ECHO    CXR interpreted by me:    MEDICATIONS  (STANDING):  allopurinol 300 milliGRAM(s) Oral daily  cefepime   IVPB 1000 milliGRAM(s) IV Intermittent every 12 hours  norepinephrine Infusion 0.05 MICROgram(s)/kG/Min (6.89 mL/Hr) IV Continuous <Continuous>  phenylephrine    Infusion 0.5 MICROgram(s)/kG/Min (6.89 mL/Hr) IV Continuous <Continuous>  vancomycin  IVPB 1000 milliGRAM(s) IV Intermittent every 12 hours    MEDICATIONS  (PRN):

## 2024-02-09 NOTE — PROGRESS NOTE ADULT - ASSESSMENT
78-year-old female past medical history of AML on chemo followed by Dr. Marsh presenting to the ED for evaluation of weakness and shortness of breath since earlier today. Daughter states that she was seen by Dr. Marsh, had an "injection to increase her blood count," received two liters of fluids. Shortly afterwards patient had increased work of breathing and began to feel weak    acute respiratory failure / sepsis / neutropenic fever / acute renal failure / metabolic acidosis      - Cefepime, vanco   - NPO while on NIPPV   - IV fluids and bicarb   - titrate pressors to maintain MAP>65   - consult oncology Dr Funes   - pittman cultures - ID consult

## 2024-02-09 NOTE — CONSULT NOTE ADULT - SUBJECTIVE AND OBJECTIVE BOX
HPI:  chemo followed by Dr. Marsh presenting to the ED for evaluation of weakness and shortness of breath since earlier today. Daughter states that she was seen by Dr. Marsh, had an "injection to increase her blood count," received two liters of fluids. Shortly afterwards patient had increased work of breathing and began to feel weak. Otherwise denies any chills, headache, changes in vision, cough, congestion, cp, palpitations, n/v/d, abd pain, constipation, urinary complaints.   (09 Feb 2024 00:08)        Cardiology Update: 79 y/o f w/ pmh AML on chemo presenting to the ED for evaluation of weakness and shortness of breath x 1day. Her w/u consistent with septic shock requiring pressors. TTE showed reduced global LV systolic function EF 25%; moderate MR. Pt denies history of known heart disease. She denies chest pain, palpitation, n/v, dizziness, diaphoresis fever/chills            PAST MEDICAL & SURGICAL HISTORY  AML (acute myeloid leukemia)  on chemotx    Brain aneurysm  S/P coiling    S/P cerebral aneurysm repair  coiling        FAMILY HISTORY:  FAMILY HISTORY:  FH: heart disease (Mother)        SOCIAL HISTORY:  []smoker  []Alcohol  []Drug    ALLERGIES:  No Known Allergies      MEDICATIONS:  MEDICATIONS  (STANDING):  allopurinol 300 milliGRAM(s) Oral daily  meropenem  IVPB 2000 milliGRAM(s) IV Intermittent every 12 hours  norepinephrine Infusion 0.05 MICROgram(s)/kG/Min (6.89 mL/Hr) IV Continuous <Continuous>  phenylephrine    Infusion 0.5 MICROgram(s)/kG/Min (6.89 mL/Hr) IV Continuous <Continuous>  sodium bicarbonate  Infusion 0.202 mEq/kG/Hr (100 mL/Hr) IV Continuous <Continuous>  vasopressin Infusion 0.02 Unit(s)/Min (3 mL/Hr) IV Continuous <Continuous>    MEDICATIONS  (PRN):      HOME MEDICATIONS:  Home Medications:  acyclovir 400 mg oral tablet: 1 tab(s) orally once a day (11 Nov 2022 20:53)  allopurinol 300 mg oral tablet: 1 tab(s) orally once a day (11 Nov 2022 20:53)  potassium chloride 20 mEq oral tablet, extended release: 1 tab(s) orally once a day (11 Nov 2022 20:53)      VITALS:   T(F): 98.8 (02-09 @ 15:00), Max: 104.7 (02-08 @ 21:30)  HR: 104 (02-09 @ 16:00) (87 - 137)  BP: 92/42 (02-09 @ 16:00) (66/38 - 125/55)  BP(mean): 60 (02-09 @ 16:00) (52 - 91)  RR: 34 (02-09 @ 16:00) (18 - 51)  SpO2: 100% (02-09 @ 16:00) (82% - 100%)    I&O's Summary    08 Feb 2024 07:01  -  09 Feb 2024 07:00  --------------------------------------------------------  IN: 2062 mL / OUT: 390 mL / NET: 1672 mL    09 Feb 2024 07:01  -  09 Feb 2024 17:32  --------------------------------------------------------  IN: 1960 mL / OUT: 1925 mL / NET: 35 mL        REVIEW OF SYSTEMS:  CONSTITUTIONAL: No weakness, fevers or chills  EYES: No visual changes  ENT: No vertigo or throat pain   NECK: No pain or stiffness  RESPIRATORY: No cough, wheezing, hemoptysis; No shortness of breath  CARDIOVASCULAR: No chest pain or palpitations  GASTROINTESTINAL: No abdominal or epigastric pain. No nausea, vomiting, or hematemesis; No diarrhea or constipation. No melena or hematochezia.  GENITOURINARY: No dysuria, frequency or hematuria  NEUROLOGICAL: No numbness or weakness  SKIN: No itching, no rashes  MSK: no    PHYSICAL EXAM:  NEURO: patient is awake , alert and oriented  GEN: Not in acute distress  NECK: no thyroid enlargement, no JVD  LUNGS: Clear to auscultation bilaterally   CARDIOVASCULAR: S1/S2 present, RRR , no murmurs or rubs, no carotid bruits,  + PP bilaterally  ABD: Soft, non-tender, non-distended, +BS  EXT: No DIMITRI  SKIN: Intact    LABS:                        7.6    0.40  )-----------( 108      ( 09 Feb 2024 06:16 )             25.1     02-09    142  |  102  |  30<H>  ----------------------------<  180<H>  4.5   |  10<L>  |  1.5    Ca    7.5<L>      09 Feb 2024 11:00  Phos  6.8     02-09  Mg     1.7     02-09    TPro  4.5<L>  /  Alb  2.8<L>  /  TBili  6.4<H>  /  DBili  x   /  AST  119<H>  /  ALT  54<H>  /  AlkPhos  44  02-09    PT/INR - ( 08 Feb 2024 18:10 )   PT: 18.10 sec;   INR: 1.58 ratio         PTT - ( 08 Feb 2024 18:10 )  PTT:38.3 sec  Lactate, Blood: 17.9 mmol/L *HH* (02-09-24 @ 11:00)  Lactate, Blood: 17.2 mmol/L *HH* (02-09-24 @ 06:16)  Lactate, Blood: 4.0 mmol/L *HH* (02-08-24 @ 20:37)  Lactate, Blood: 1.8 mmol/L (02-08-24 @ 18:10)          Troponin trend:            RADIOLOGY:  < from: TTE Echo Complete w/o Contrast w/ Doppler (02.09.24 @ 08:08) >  Summary:   1. LV Ejection Fraction by Harper's Method with a biplane EF of 25 %.   2. Normal left atrial size.   3. Moderate mitral valve regurgitation.   4. Structurally normal mitral valve, with normal leaflet excursion.   5. Mild to moderate aortic regurgitation.   6. Normal trileaflet aortic valve with normal opening.    PHYSICIAN INTERPRETATION:  Left Ventricle: The left ventricular internal cavity size is normal. Left   ventricular wall thickness is normal. Normal segmental left ventricular   systolic function.  Left Atrium: Normal left atrial size.  Mitral Valve: Structurally normal mitral valve, with normal leaflet   excursion. Moderate mitral valve regurgitation is seen.  Aortic Valve: Normal trileaflet aortic valve with normal opening. Mild to   moderate aortic valve regurgitation is seen.    < end of copied text >    ECG:  < from: 12 Lead ECG (02.08.24 @ 18:17) >  Ventricular Rate 134 BPM    Atrial Rate 134 BPM    P-R Interval 138 ms    QRS Duration 78 ms    Q-T Interval 268 ms    QTC Calculation(Bazett) 400 ms    P Axis 25 degrees    R Axis 23 degrees    T Axis 54 degrees    Diagnosis Line Sinus tachycardia  Otherwise normal ECG    Confirmed by RUSLAN KAPLAN, MIRIAM ([id number]) on 2/8/2024 7:05:51 PM    < end of copied text >    TELEMETRY EVENTS:

## 2024-02-09 NOTE — DIETITIAN INITIAL EVALUATION ADULT - PERTINENT MEDS FT
MEDICATIONS  (STANDING):  allopurinol 300 milliGRAM(s) Oral daily  amikacin  IVPB 1000 milliGRAM(s) IV Intermittent once  meropenem  IVPB 2000 milliGRAM(s) IV Intermittent every 12 hours  norepinephrine Infusion 0.05 MICROgram(s)/kG/Min (6.89 mL/Hr) IV Continuous <Continuous>  phenylephrine    Infusion 0.5 MICROgram(s)/kG/Min (6.89 mL/Hr) IV Continuous <Continuous>  sodium bicarbonate  Infusion 0.202 mEq/kG/Hr (100 mL/Hr) IV Continuous <Continuous>  vasopressin Infusion 0.02 Unit(s)/Min (3 mL/Hr) IV Continuous <Continuous>    MEDICATIONS  (PRN):

## 2024-02-09 NOTE — CONSULT NOTE ADULT - ASSESSMENT
AML on maintenance chemotherapy.  Chemo induced  anemia and neutropenia, thrombocytopenia.  Gram negative bacteremia  Neutropenic fever.  DEBORAH      Please transfuse more of packed cells.  Neutropenic precautions.  Broad spectrum anti-biotics as per ID  Please follow culture and sensitivity report.  Please premedicate with 2 TABS Tylenol and Benadryl, 25 mg, IV, before the transfusion  Please continue hydration.  Please call me at [phone] if you need any assistance with her.    Thank you.

## 2024-02-09 NOTE — DIETITIAN INITIAL EVALUATION ADULT - ADD RECOMMEND
RD to monitor ability to tolerate po diet, labs/meds, NFPF and f/u as needed within 3-5 days  high risk

## 2024-02-09 NOTE — DIETITIAN INITIAL EVALUATION ADULT - CALCULATED FROM (ML/KG)
Xerosis Normal Treatment: I recommended application of Cetaphil or CeraVe numerous times a day and before going to bed to all dry areas. 1855

## 2024-02-10 NOTE — PROGRESS NOTE ADULT - ASSESSMENT
Impression:  AML  anemia  Neutropenic fever  septic shock on pressors  DEBORAH ATN   shock liver  Pseudomonas bacteremia    Plan:    Continue meropenem  Wean pressor as tolerated target MAP>65  ECHO noted EF 25%  NIV on and off as needed  Transfuse to target hb >7  Zarxia per heme/onc  dvt ppx  dewayne lft  RUQ US

## 2024-02-10 NOTE — PHARMACOTHERAPY INTERVENTION NOTE - OUTCOME
Implemented All Fall Risk Interventions:  [place] to call system. Call bell, personal items and telephone within reach. Instruct patient to call for assistance. Room bathroom lighting operational. Non-slip footwear when patient is off stretcher. Physically safe environment: no spills, clutter or unnecessary equipment. Stretcher in lowest position, wheels locked, appropriate side rails in place. Provide visual cue, wrist band, yellow gown, etc. Monitor gait and stability. Monitor for mental status changes and reorient to person, place, and time. Review medications for side effects contributing to fall risk. Reinforce activity limits and safety measures with patient and family.
accepted

## 2024-02-10 NOTE — PHARMACOTHERAPY INTERVENTION NOTE - COMMENTS
As per policy, ordered a repeat vancomycin trough level for 2/12 AM prior to the 4th dose of vancomycin.    Lc Rincon, PharmD, [facility]  Clinical Pharmacy Specialist, Infectious Diseases  Tele-Antimicrobial Stewardship Program (Tele-ASP)  Tele-ASP Phone: [phone] 
Dr. Rodríguez would like to give a one-time dose of amikacin for the treatment of Pseudomonas aeruginosa bacteremia in the setting of septic shock. Recommended a dose of 1000mg (~16mg/kg using AdjBW of 62.5kg) IV x 1 STAT. 
Patient on vancomycin 1000mg IV q12h empirically in the setting of septic shock per Dr. Rodríguez. Patient has received two doses of regimen so far. Per Precise Bayesian vancomycin dosing software, current regimen predicts a supratherapeutic steady-state AUC/ARIES of 1737 mg/L*hr (goal 400-600). Precise is recommending to hold vancomycin until tomorrow (2/10) at 1953, and patient is in DEBORAH. Based on this, recommended to hold standing regimen of vancomycin, and to obtain a vancomycin level on 2/10 with AM labs to assist with further dosing.  
Recommended changing meropenem dose to 2g IV q12h over 3 hours from 1g IV q8h given CrCl of ~31.5 mL/min, and presence of septic shock. 
Recommended re-initiating vancomycin 1g IV q12h in the setting of a vancomycin trough level of 9.3mcg/mL and resolving DEBORAH. According to PrecisePK ,a Bayesian pharmacokinetic modeling program, this regimen is predicted to result in a steady-state vancomycin AUC of ~470mg*hr/mL, which is within the goal range of 400-600mg*hr/mL.    Lc Rincon, PharmD, [facility]  Clinical Pharmacy Specialist, Infectious Diseases  Tele-Antimicrobial Stewardship Program (Tele-ASP)  Tele-ASP Phone: [phone]

## 2024-02-10 NOTE — PROGRESS NOTE ADULT - SUBJECTIVE AND OBJECTIVE BOX
Nephrology progress note    Patient is seen and examined, events over the last 24 h noted .    Allergies:  No Known Allergies    Hospital Medications:   MEDICATIONS  (STANDING):  allopurinol 300 milliGRAM(s) Oral daily  meropenem  IVPB 2000 milliGRAM(s) IV Intermittent every 12 hours  norepinephrine Infusion 0.05 MICROgram(s)/kG/Min (6.89 mL/Hr) IV Continuous <Continuous>  phenylephrine    Infusion 0.5 MICROgram(s)/kG/Min (6.89 mL/Hr) IV Continuous <Continuous>  sodium bicarbonate  Infusion 0.202 mEq/kG/Hr (100 mL/Hr) IV Continuous <Continuous>  vasopressin Infusion 0.02 Unit(s)/Min (3 mL/Hr) IV Continuous <Continuous>        VITALS:  T(F): 100.2 (02-10-24 @ 05:00), Max: 100.6 (02-09-24 @ 23:00)  HR: 104 (02-10-24 @ 08:19)  BP: 96/45 (02-10-24 @ 06:09)  RR: 25 (02-10-24 @ 06:09)  SpO2: 100% (02-10-24 @ 08:19)  Wt(kg): --    02-08 @ 07:01  -  02-09 @ 07:00  --------------------------------------------------------  IN: 2062 mL / OUT: 390 mL / NET: 1672 mL    02-09 @ 07:01  -  02-10 @ 07:00  --------------------------------------------------------  IN: 6313 mL / OUT: 5910 mL / NET: 403 mL    02-10 @ 07:01  -  02-10 @ 09:39  --------------------------------------------------------  IN: 0 mL / OUT: 350 mL / NET: -350 mL          PHYSICAL EXAM:  Constitutional: In resp distress, on BiPAP  HEENT: anicteric sclera,   Respiratory: CTA  Cardiovascular: S1, S2, RRR  Gastrointestinal: BS+, soft, NT/ND  Extremities: No peripheral edema  Neurological: Alert  :  vann.   Skin: No rashes  Vascular Access:    LABS:  02-10    142  |  99  |  28<H>  ----------------------------<  164<H>  2.6<LL>   |  28  |  0.8    Ca    7.5<L>      10 Feb 2024 05:39  Phos  2.7     02-10  Mg     1.7     02-10    TPro  3.9<L>  /  Alb  2.4<L>  /  TBili  6.6<H>  /  DBili      /  AST  1505<H>  /  ALT  1093<H>  /  AlkPhos  44  02-10                          8.4    0.19  )-----------( 22       ( 10 Feb 2024 05:39 )             23.6       Urine Studies:  Urinalysis Basic - ( 10 Feb 2024 05:39 )    Color:  / Appearance:  / SG:  / pH:   Gluc: 164 mg/dL / Ketone:   / Bili:  / Urobili:    Blood:  / Protein:  / Nitrite:    Leuk Esterase:  / RBC:  / WBC    Sq Epi:  / Non Sq Epi:  / Bacteria:         RADIOLOGY & ADDITIONAL STUDIES:  < from: Xray Chest 1 View- PORTABLE-Urgent (Xray Chest 1 View- PORTABLE-Urgent .) (02.09.24 @ 08:55) >    Improving bilateral opacities.    < end of copied text >

## 2024-02-10 NOTE — PROGRESS NOTE ADULT - SUBJECTIVE AND OBJECTIVE BOX
Patient is a 78y old  Female who presents with a chief complaint of Neutropenic fever (09 Feb 2024 19:37)        HPI:  chemo followed by Dr. Marsh presenting to the ED for evaluation of weakness and shortness of breath since earlier today. Daughter states that she was seen by Dr. Marsh, had an "injection to increase her blood count," received two liters of fluids. Shortly afterwards patient had increased work of breathing and began to feel weak. Otherwise denies any chills, headache, changes in vision, cough, congestion, cp, palpitations, n/v/d, abd pain, constipation, urinary complaints.   (09 Feb 2024 00:08)      Pt evaluated on rounds.  I reviewed the radiology tests and hospital record.    I reviewed previous notes on this patient.    Interval Events: No overnight events.    REVIEW OF SYSTEMS:   see HPI      OBJECTIVE:  ICU Vital Signs Last 24 Hrs  T(C): 37.9 (10 Feb 2024 05:00), Max: 38.1 (09 Feb 2024 23:00)  T(F): 100.2 (10 Feb 2024 05:00), Max: 100.6 (09 Feb 2024 23:00)  HR: 104 (10 Feb 2024 05:00) (101 - 114)  BP: 125/51 (10 Feb 2024 05:00) (78/54 - 125/51)  BP(mean): 74 (10 Feb 2024 05:00) (59 - 91)  ABP: --  ABP(mean): --  RR: 25 (10 Feb 2024 05:00) (18 - 51)  SpO2: 100% (10 Feb 2024 05:00) (100% - 100%)    O2 Parameters below as of 10 Feb 2024 03:00  Patient On (Oxygen Delivery Method): BiPAP/CPAP              02-08 @ 07:01  -  02-09 @ 07:00  --------------------------------------------------------  IN: 2062 mL / OUT: 390 mL / NET: 1672 mL    02-09 @ 07:01  -  02-10 @ 05:32  --------------------------------------------------------  IN: 6085 mL / OUT: 4885 mL / NET: 1200 mL      CAPILLARY BLOOD GLUCOSE            PHYSICAL EXAM:       · ENMT:   Airway patent,   Nasal mucosa clear.  Mouth with normal mucosa.   No thrush    · EYES:   Clear bilaterally,   pupils equal,   round and reactive to light.    · CARDIAC:   Normal rate,   regular rhythm.    Heart sounds S1, S2.   No murmurs, no rubs or gallops on auscultation  no edema        CAROTID:   normal systolic impulse  no bruits    · RESPIRATORY:   rales  normal chest expansion  no retractions or use of accessory muscles  percussion of chest demonstrates no hyperresonance or dullness    · GASTROINTESTINAL:  Abdomen soft,   non-tender,   + BS  liver/spleen not palpable    · MUSCULOSKELETAL:   no clubbing, cyanosis      · SKIN:   Skin normal color for race,   warm, dry   No evidence of rash.        · HEME LYMPH:   no splenomegaly.  No cervical  lymphadenopathy.  no inguinal lymphadenopathy    HOSPITAL MEDICATIONS:  MEDICATIONS  (STANDING):  allopurinol 300 milliGRAM(s) Oral daily  meropenem  IVPB 2000 milliGRAM(s) IV Intermittent every 12 hours  norepinephrine Infusion 0.05 MICROgram(s)/kG/Min (6.89 mL/Hr) IV Continuous <Continuous>  phenylephrine    Infusion 0.5 MICROgram(s)/kG/Min (6.89 mL/Hr) IV Continuous <Continuous>  sodium bicarbonate  Infusion 0.202 mEq/kG/Hr (100 mL/Hr) IV Continuous <Continuous>  vasopressin Infusion 0.02 Unit(s)/Min (3 mL/Hr) IV Continuous <Continuous>    MEDICATIONS  (PRN):    lactated ringers Bolus:   500 milliLiter(s), IV Bolus, once, infuse over 30 Minute(s), Stop After 1 Doses  lactated ringers Bolus:   1000 milliLiter(s), IV Bolus, once, infuse over 60 Minute(s), Stop After 1 Doses  sodium chloride 0.9%: 1000 milliLiter(s)      with sodium bicarbonate additive 150 milliEquivalent(s), infuse at 75 mL/Hr  lactated ringers.: Solution, 1000 milliLiter(s) infuse at 100 mL/Hr, Stop After 1 Days  lactated ringers Bolus:   1000 milliLiter(s), IV Bolus, once, infuse over 60 Minute(s), Stop After 1 Doses  sodium chloride 0.9% Bolus:   500 milliLiter(s), IV Bolus, once, infuse over 30 Minute(s), Stop After 1 Doses  sodium chloride 0.9% Bolus:   500 milliLiter(s), IV Bolus, once, infuse over 60 Minute(s), Stop After 1 Doses      LABS:                        7.6    0.40  )-----------( 108      ( 09 Feb 2024 06:16 )             25.1     02-09    135  |  98  |  30<H>  ----------------------------<  245<H>  6.5<HH>   |  19  |  1.3    Ca    7.1<L>      09 Feb 2024 20:05  Phos  6.8     02-09  Mg     1.9     02-09    TPro  4.5<L>  /  Alb  2.8<L>  /  TBili  6.4<H>  /  DBili  x   /  AST  119<H>  /  ALT  54<H>  /  AlkPhos  44  02-09    PT/INR - ( 08 Feb 2024 18:10 )   PT: 18.10 sec;   INR: 1.58 ratio         PTT - ( 08 Feb 2024 18:10 )  PTT:38.3 sec  Urinalysis Basic - ( 09 Feb 2024 20:05 )    Color: x / Appearance: x / SG: x / pH: x  Gluc: 245 mg/dL / Ketone: x  / Bili: x / Urobili: x   Blood: x / Protein: x / Nitrite: x   Leuk Esterase: x / RBC: x / WBC x   Sq Epi: x / Non Sq Epi: x / Bacteria: x      Arterial Blood Gas:  02-09 @ 12:10  7.27/19/231/9/99.7/-16.3  ABG lactate: --  Arterial Blood Gas:  02-09 @ 08:54  7.09/<19/256/5/99.0/-22.7  ABG lactate: --    Venous Blood Gas:  02-08 @ 18:32  7.44/34/30/23/45.1  VBG Lactate: 2.1                ABG - ( 09 Feb 2024 12:10 )  pH, Arterial: 7.27  pH, Blood: x     /  pCO2: 19    /  pO2: 231   / HCO3: 9     / Base Excess: -16.3 /  SaO2: 99.7                RADIOLOGY: Today I personally interpreted the latest CXR and other pertinent films.

## 2024-02-10 NOTE — PHARMACOTHERAPY INTERVENTION NOTE - NSPHARMCOMMASP
ASP - Lab/ test recommended
ASP - Therapy recommended/ Alternative therapy
ASP - Dose optimization/Non-Renal dose adjustment
ASP - Renal dose adjustment

## 2024-02-10 NOTE — PROGRESS NOTE ADULT - ASSESSMENT
DEBORAH / pre-renal iso hypotension (also received contrast, aminoglycoside) / CT noted, no hydro  non oliguric, urine output significantly improved with iv fluid bolus and creatinine is down-trending  Neutropenic fever with septic shock / pancytopenia  HAGMA / lactic acidosis  AML on chemo  Hypokalemia 2.8 from 6.5  yesterday - made 5.7 L of urine  Polyuria - 5.7 unclear why - saline diuresis, BP lowish - LFTs shock liver   - d/c bicarb drip - bicarb 10-28?  Urine Na Urine OSm Urine Cl   - repeat STAT BMP    - monitor ionized calcium level closely.  replete to 1 iv as needed  - phos noted c/f ?TLS / on allopurinol should be max 200mg, monitor phos, uric acid, K level closely  - strict i/o  - monitor volume status closely.  TTE noted with EF 25%  - pressor support as needed  - abx per ID recs / dose for eGFR (vancomycin by level)  - hem/onc f/u

## 2024-02-10 NOTE — PROGRESS NOTE ADULT - ASSESSMENT
IMPRESSION:  severe sepsis POA  Septic shock on phenylephrine  Acute neutropenic fever  H/o AML on chemotherapy   uti   Renu   metabolic acidosis     PLAN: see attestation note     CNS: Avoid depressants    HEENT: Oral care    PULMONARY: Trial NC. Goal oxygen 92-95%. HOB @ 45 degrees. Aspiration precautions     CARDIOVASCULAR: On phenylephrine.   goal systolic >110.   CHeetah.   Trend lactate.   Obtain echo.    GI: GI prophylaxis. Feeding. Bowel regimen     RENAL:  Follow up lytes.  Correct as needed    INFECTIOUS DISEASE: Follow up blood and urine cultures.  doubt need  Vancomycin   cont meropenem. ID consult    HEMATOLOGICAL:  DVT prophylaxis.  Heme f/u    ENDOCRINE:  Follow up FS.  Insulin protocol if needed.    MUSCULOSKELETAL: Bed rest    ICU      The patient is critically ill with a high probability of deterioration.

## 2024-02-10 NOTE — PROGRESS NOTE ADULT - SUBJECTIVE AND OBJECTIVE BOX
Patient is a 78y old  Female who presents with a chief complaint of Neutropenic fever (10 Feb 2024 09:39)      overnight events: off NIV now. on 4 liter NC. on pressor                ROS: as in HPI; All other systems reviewed are negative        PHYSICAL EXAM  Vital Signs Last 24 Hrs  T(C): 38.5 (10 Feb 2024 12:00), Max: 38.5 (10 Feb 2024 12:00)  T(F): 101.3 (10 Feb 2024 12:00), Max: 101.3 (10 Feb 2024 12:00)  HR: 102 (10 Feb 2024 12:00) (100 - 107)  BP: 123/57 (10 Feb 2024 12:00) (88/42 - 125/51)  BP(mean): 72 (10 Feb 2024 12:00) (59 - 91)  RR: 28 (10 Feb 2024 12:00) (21 - 54)  SpO2: 100% (10 Feb 2024 12:00) (96% - 100%)    Parameters below as of 10 Feb 2024 11:00  Patient On (Oxygen Delivery Method): nasal cannula  O2 Flow (L/min): 4        CONSTITUTIONAL:   NAD    ENT:   Airway patent,     EYES:   Clear bilaterally,   pupils equal,   round and reactive to light.    CARDIAC:   Normal rate,   regular rhythm.    no edema    RESPIRATORY:   No wheezing   Normal chest expansion  Not tachypneic,    GASTROINTESTINAL:  Abdomen soft, non-tender,   No guarding,   Positive BS    MUSCULOSKELETAL:   Range of motion is not limited,    NEUROLOGICAL:   Alert and oriented   No motor deficits.    SKIN:   Skin normal color for race,   No evidence of rash.            ABG - ( 2024 12:10 )  pH, Arterial: 7.27  pH, Blood: x     /  pCO2: 19    /  pO2: 231   / HCO3: 9     / Base Excess: -16.3 /  SaO2: 99.7                I&O's Detail    2024 07:01  -  10 Feb 2024 07:00  --------------------------------------------------------  IN:    IV PiggyBack: 50 mL    IV PiggyBack: 100 mL    IV PiggyBack: 100 mL    IV PiggyBack: 240 mL    Phenylephrine: 3260 mL    PRBCs (Packed Red Blood Cells): 300 mL    Sodium Bicarbonate: 2200 mL    Vasopressin: 63 mL  Total IN: 6313 mL    OUT:    Indwelling Catheter - Urethral (mL): 5760 mL    Voided (mL): 150 mL  Total OUT: 5910 mL    Total NET: 403 mL      10 Feb 2024 07:01  -  10 Feb 2024 13:25  --------------------------------------------------------  IN:    Phenylephrine: 616 mL    Sodium Bicarbonate: 200 mL    Vasopressin: 15 mL  Total IN: 831 mL    OUT:    Indwelling Catheter - Urethral (mL): 2950 mL  Total OUT: 2950 mL    Total NET: -2119 mL            LABS:                        8.4    0.19  )-----------( 22       ( 10 Feb 2024 05:39 )             23.6     10 Feb 2024 05:39    142    |  99     |  28     ----------------------------<  164    2.6     |  28     |  0.8      Ca    7.5        10 Feb 2024 05:39  Phos  2.7       10 Feb 2024 05:39  Mg     1.7       10 Feb 2024 05:39    TPro  3.9    /  Alb  2.4    /  TBili  6.6    /  DBili  x      /  AST  1505   /  ALT  1093   /  AlkPhos  44     10 Feb 2024 05:39  Amylase x     lipase x              CAPILLARY BLOOD GLUCOSE        PT/INR - ( 2024 18:10 )   PT: 18.10 sec;   INR: 1.58 ratio         PTT - ( 2024 18:10 )  PTT:38.3 sec  Urinalysis Basic - ( 10 Feb 2024 10:57 )    Color: Yellow / Appearance: Clear / S.009 / pH: x  Gluc: x / Ketone: Negative mg/dL  / Bili: Negative / Urobili: 1.0 mg/dL   Blood: x / Protein: 30 mg/dL / Nitrite: Negative   Leuk Esterase: Negative / RBC: 3 /HPF / WBC 1 /HPF   Sq Epi: x / Non Sq Epi: 2 /HPF / Bacteria: Few /HPF      Culture    Culture - Urine (collected 2024 19:23)  Source: Clean Catch Clean Catch (Midstream)  Final Report (10 Feb 2024 13:22):    Normal Urogenital stefano present    Culture - Blood (collected 2024 18:10)  Source: .Blood Blood-Peripheral  Gram Stain (2024 09:46):    Growth in aerobic bottle: Gram Negative Rods  Preliminary Report (2024 09:46):    Growth in aerobic bottle: Gram Negative Rods    Direct identification is available within approximately 3-5    hours either by Blood Panel Multiplexed PCR or Direct    MALDI-TOF. Details: [email or web address]  Organism: Blood Culture PCR (2024 11:15)  Organism: Blood Culture PCR (2024 11:15)    Culture - Blood (collected 2024 18:10)  Source: .Blood Blood-Peripheral  Gram Stain (2024 10:39):    Growth in aerobic bottle: Gram Negative Rods  Preliminary Report (2024 10:39):    Growth in aerobic bottle: Gram Negative Rods      Lactate, Blood: 5.7 mmol/L (02-10-24 @ 05:39)  Lactate, Blood: 9.6 mmol/L (24 @ 20:05)  Lactate, Blood: 17.9 mmol/L (24 @ 11:00)  Lactate, Blood: 17.2 mmol/L (24 @ 06:16)  Lactate, Blood: 4.0 mmol/L (24 @ 20:37)      MEDICATIONS  (STANDING):  allopurinol 300 milliGRAM(s) Oral daily  dextrose 5% + sodium chloride 0.9%. 1000 milliLiter(s) (100 mL/Hr) IV Continuous <Continuous>  filgrastim-sndz (ZARXIO) Injectable 480 MICROGram(s) SubCutaneous every 24 hours  meropenem  IVPB 2000 milliGRAM(s) IV Intermittent every 12 hours  norepinephrine Infusion 0.05 MICROgram(s)/kG/Min (6.89 mL/Hr) IV Continuous <Continuous>  phenylephrine    Infusion 0.5 MICROgram(s)/kG/Min (6.89 mL/Hr) IV Continuous <Continuous>  vasopressin Infusion 0.02 Unit(s)/Min (3 mL/Hr) IV Continuous <Continuous>    MEDICATIONS  (PRN):

## 2024-02-11 NOTE — PROVIDER CONTACT NOTE (OTHER) - ACTION/TREATMENT ORDERED:
as per ABDULKADIR Rubio, administer prn ibuprofen 400mg po for fever, maintain cooling blanket, and ok to start platelet transfusion.

## 2024-02-11 NOTE — PROGRESS NOTE ADULT - ASSESSMENT
IMPRESSION:  severe sepsis POA  Septic shock on phenylephrine  pseudomonas bacteremia  Acute neutropenic fever  H/o AML on chemotherapy   uti   Renu   metabolic acidosis     PLAN: see attestation note     CNS: Avoid depressants    HEENT: Oral care    PULMONARY: Trial NC. Goal oxygen 92-95%. HOB @ 45 degrees. Aspiration precautions     CARDIOVASCULAR: On phenylephrine.   goal systolic >110.   CHeetah.   Trend lactate.   Obtain echo.    GI: GI prophylaxis. Feeding. Bowel regimen     RENAL:  Follow up lytes.  Correct as needed  supplement lytes    INFECTIOUS DISEASE: Follow up blood and urine cultures.  doubt need  Vancomycin   cont meropenem. ID consult    HEMATOLOGICAL:  DVT prophylaxis.  Heme f/u    ENDOCRINE:  Follow up FS.  Insulin protocol if needed.    MUSCULOSKELETAL: Bed rest    ICU      The patient is critically ill with a high probability of deterioration.

## 2024-02-11 NOTE — SWALLOW BEDSIDE ASSESSMENT ADULT - SWALLOW EVAL: ORAL MUSCULATURE
unable to assess due to poor participation/comprehension Render Post-Care Instructions In Note?: yes

## 2024-02-11 NOTE — PROGRESS NOTE ADULT - ASSESSMENT
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 - transaminitis is better -due to chemo vs hypotension  GI consult  Sepsis - Pseudomonas in BCX now neg  - on Merrem and Vanco  - monitor ionized calcium level closely.  replete to 1 iv as needed  -  on allopurinol should be max 200mg,  - strict i/o  - monitor volume status closely.  TTE noted with EF 25%  D/W medical team

## 2024-02-11 NOTE — PROGRESS NOTE ADULT - ASSESSMENT
Neutropenic fever with Pseudomonas sepsis.  Polyuria  Acute liver dysfunction with jaundice.  AML on maintenance chemotherapy.  Thrombocytopenia secondary to sepsis  Hypotension    Continue Zarxio injection till white count is 2  Antibiotics as per ID  Polyuria treatment as per Nephrology  Please continue CCU care

## 2024-02-11 NOTE — PROGRESS NOTE ADULT - SUBJECTIVE AND OBJECTIVE BOX
Patient is a 78y old  Female who presents with a chief complaint of Neutropenic fever (10 Feb 2024 13:25)        HPI:  chemo followed by Dr. Marsh presenting to the ED for evaluation of weakness and shortness of breath since earlier today. Daughter states that she was seen by Dr. Marsh, had an "injection to increase her blood count," received two liters of fluids. Shortly afterwards patient had increased work of breathing and began to feel weak. Otherwise denies any chills, headache, changes in vision, cough, congestion, cp, palpitations, n/v/d, abd pain, constipation, urinary complaints.   (09 Feb 2024 00:08)      Pt evaluated on rounds.  I reviewed the radiology tests and hospital record.    I reviewed previous notes on this patient.    Interval Events: No overnight events.    REVIEW OF SYSTEMS:   see HPI      OBJECTIVE:  ICU Vital Signs Last 24 Hrs  T(C): 38.1 (11 Feb 2024 03:01), Max: 38.9 (10 Feb 2024 19:13)  T(F): 100.6 (11 Feb 2024 03:01), Max: 102 (10 Feb 2024 19:13)  HR: 106 (11 Feb 2024 04:00) (100 - 109)  BP: 93/59 (11 Feb 2024 04:00) (90/44 - 125/57)  BP(mean): 70 (11 Feb 2024 04:00) (63 - 80)  RR: 41 (11 Feb 2024 04:00) (21 - 54)  SpO2: 99% (11 Feb 2024 04:00) (96% - 100%)    O2 Parameters below as of 11 Feb 2024 04:00  Patient On (Oxygen Delivery Method): nasal cannula              02-09 @ 07:01  -  02-10 @ 07:00  --------------------------------------------------------  IN: 6313 mL / OUT: 5910 mL / NET: 403 mL    02-10 @ 07:01  -  02-11 @ 05:26  --------------------------------------------------------  IN: 5384 mL / OUT: 5915 mL / NET: -531 mL      CAPILLARY BLOOD GLUCOSE            PHYSICAL EXAM:       · ENMT:   Airway patent,   Nasal mucosa clear.  Mouth with normal mucosa.   No thrush    · EYES:   Clear bilaterally,   pupils equal,   round and reactive to light.    · CARDIAC:   Normal rate,   regular rhythm.    Heart sounds S1, S2.   No murmurs, no rubs or gallops on auscultation  no edema        CAROTID:   normal systolic impulse  no bruits    · RESPIRATORY:   rales  normal chest expansion  no retractions or use of accessory muscles  percussion of chest demonstrates no hyperresonance or dullness    · GASTROINTESTINAL:  Abdomen soft,   non-tender,   + BS  liver/spleen not palpable    · MUSCULOSKELETAL:   no clubbing, cyanosis      · SKIN:   Skin normal color for race,   warm, dry   No evidence of rash.        · HEME LYMPH:   no splenomegaly.  No cervical  lymphadenopathy.  no inguinal lymphadenopathy    HOSPITAL MEDICATIONS:  MEDICATIONS  (STANDING):  allopurinol 300 milliGRAM(s) Oral daily  dextrose 5% + sodium chloride 0.9%. 1000 milliLiter(s) (100 mL/Hr) IV Continuous <Continuous>  filgrastim-sndz (ZARXIO) Injectable 480 MICROGram(s) SubCutaneous every 24 hours  meropenem  IVPB 2000 milliGRAM(s) IV Intermittent every 12 hours  norepinephrine Infusion 0.05 MICROgram(s)/kG/Min (6.89 mL/Hr) IV Continuous <Continuous>  phenylephrine    Infusion 0.5 MICROgram(s)/kG/Min (6.89 mL/Hr) IV Continuous <Continuous>  vancomycin  IVPB      vancomycin  IVPB 1000 milliGRAM(s) IV Intermittent every 12 hours  vasopressin Infusion 0.02 Unit(s)/Min (3 mL/Hr) IV Continuous <Continuous>    MEDICATIONS  (PRN):  ibuprofen  Tablet. 400 milliGRAM(s) Oral every 8 hours PRN Temp greater or equal to 38C (100.4F), Mild Pain (1 - 3)    lactated ringers Bolus:   500 milliLiter(s), IV Bolus, once, infuse over 30 Minute(s), Stop After 1 Doses  lactated ringers Bolus:   1000 milliLiter(s), IV Bolus, once, infuse over 60 Minute(s), Stop After 1 Doses  sodium chloride 0.9%: 1000 milliLiter(s)      with sodium bicarbonate additive 150 milliEquivalent(s), infuse at 75 mL/Hr  lactated ringers.: Solution, 1000 milliLiter(s) infuse at 100 mL/Hr, Stop After 1 Days  lactated ringers Bolus:   1000 milliLiter(s), IV Bolus, once, infuse over 60 Minute(s), Stop After 1 Doses  sodium chloride 0.9% Bolus:   500 milliLiter(s), IV Bolus, once, infuse over 30 Minute(s), Stop After 1 Doses  sodium chloride 0.9% Bolus:   500 milliLiter(s), IV Bolus, once, infuse over 60 Minute(s), Stop After 1 Doses      LABS:                        8.4    0.19  )-----------( 22       ( 10 Feb 2024 05:39 )             23.6     02-10    143  |  97<L>  |  22<H>  ----------------------------<  177<H>  2.6<LL>   |  30  |  0.7    Ca    7.8<L>      10 Feb 2024 19:46  Phos  2.7     02-10  Mg     1.7     02-10    TPro  4.3<L>  /  Alb  2.7<L>  /  TBili  7.0<H>  /  DBili  x   /  AST  >700<H>  /  ALT  >700<H>  /  AlkPhos  49  02-10      Urinalysis Basic - ( 10 Feb 2024 19:46 )    Color: x / Appearance: x / SG: x / pH: x  Gluc: 177 mg/dL / Ketone: x  / Bili: x / Urobili: x   Blood: x / Protein: x / Nitrite: x   Leuk Esterase: x / RBC: x / WBC x   Sq Epi: x / Non Sq Epi: x / Bacteria: x      Arterial Blood Gas:  02-10 @ 12:27  7.62/40/143/41/98.6/18.1  ABG lactate: --  Arterial Blood Gas:  02-09 @ 12:10  7.27/19/231/9/99.7/-16.3  ABG lactate: --  Arterial Blood Gas:  02-09 @ 08:54  7.09/<19/256/5/99.0/-22.7  ABG lactate: --                  ABG - ( 10 Feb 2024 12:27 )  pH, Arterial: 7.62  pH, Blood: x     /  pCO2: 40    /  pO2: 143   / HCO3: 41    / Base Excess: 18.1  /  SaO2: 98.6                RADIOLOGY: Today I personally interpreted the latest CXR and other pertinent films.

## 2024-02-11 NOTE — PROGRESS NOTE ADULT - ASSESSMENT
78-year-old female past medical history of AML on chemo followed by Dr. Marsh presenting to the ED for evaluation of weakness and shortness of breath since earlier today. Daughter states that she was seen by Dr. Marsh, had an "injection to increase her blood count," received two liters of fluids. Shortly afterwards patient had increased work of breathing and began to feel weak    acute respiratory failure / sepsis / neutropenic fever  / pancytopenia / pseudomonal bacteremia / polyuria / hypophosphatemia / hypokalemia      - continue meropenem - repeat BC prelim negative    - may DC vanco as MRSA is negative   - titrate pressors to MAP >65   - 30mmol k-phos and recheck lytes q shift   - LR @100ml/hr   - would transfuse platelets and maintain >20   - continue Zarxio as per Dr Funes   - pittman cultures - ID consult     - poor prognosis

## 2024-02-11 NOTE — PROGRESS NOTE ADULT - SUBJECTIVE AND OBJECTIVE BOX
INTERVAL HPI/OVERNIGHT EVENTS:  Patient S&E in CCU.  Lethargic.  Jaundice present.  Slight hypotension even on Pressors.  Polyuria present.  Febrile, now hypothermic blanket.      VITAL SIGNS:  T(F): 99.1 (24 @ 05:00)  HR: 102 (24 @ 06:00)  BP: 94/46 (24 @ 06:00)  RR: 22 (24 @ 06:00)  SpO2: 100% (24 @ 06:00)  Wt(kg): --    PHYSICAL EXAM:    Constitutional: Lethargic, jaundice present  Eyes: EOMI, sclera non-icteric  Neck: supple, no masses, no JVD  Respiratory: CTA b/l, good air entry b/l  Cardiovascular: RRR, no M/R/G  Gastrointestinal: soft, NTND, no masses palpable, + BS, no hepatosplenomegaly  Extremities: no c/c/e  Neurological: AAOx3      MEDICATIONS  (STANDING):  allopurinol 300 milliGRAM(s) Oral daily  dextrose 5% + sodium chloride 0.9%. 1000 milliLiter(s) (100 mL/Hr) IV Continuous <Continuous>  filgrastim-sndz (ZARXIO) Injectable 480 MICROGram(s) SubCutaneous every 24 hours  lactated ringers. 1000 milliLiter(s) (100 mL/Hr) IV Continuous <Continuous>  magnesium sulfate  IVPB 2 Gram(s) IV Intermittent every 2 hours  meropenem  IVPB 2000 milliGRAM(s) IV Intermittent every 12 hours  norepinephrine Infusion 0.05 MICROgram(s)/kG/Min (6.89 mL/Hr) IV Continuous <Continuous>  phenylephrine    Infusion 0.5 MICROgram(s)/kG/Min (6.89 mL/Hr) IV Continuous <Continuous>  potassium chloride  20 mEq/100 mL IVPB 20 milliEquivalent(s) IV Intermittent every 2 hours  potassium phosphate / sodium phosphate Powder (PHOS-NaK) 2 Packet(s) Oral once  vancomycin  IVPB      vancomycin  IVPB 1000 milliGRAM(s) IV Intermittent every 12 hours  vasopressin Infusion 0.02 Unit(s)/Min (3 mL/Hr) IV Continuous <Continuous>    MEDICATIONS  (PRN):  ibuprofen  Tablet. 400 milliGRAM(s) Oral every 8 hours PRN Temp greater or equal to 38C (100.4F), Mild Pain (1 - 3)      Allergies    No Known Allergies    Intolerances        LABS:                        8.4    0.19  )-----------(        ( 10 Feb 2024 05:39 )             23.6         143  |  101  |  21<H>  ----------------------------<  175<H>  3.0<L>   |  32  |  0.5<L>    Ca    7.8<L>      2024 05:45  Phos  1.5       Mg     1.5         TPro  4.0<L>  /  Alb  2.4<L>  /  TBili  7.1<H>  /  DBili  x   /  AST  352<H>  /  ALT  666<H>  /  AlkPhos  51        Urinalysis Basic - ( 2024 09:14 )    Color: Dark Yellow / Appearance: Cloudy / S.012 / pH: x  Gluc: x / Ketone: Negative mg/dL  / Bili: Small / Urobili: 1.0 mg/dL   Blood: x / Protein: 100 mg/dL / Nitrite: Negative   Leuk Esterase: Negative / RBC: x / WBC x   Sq Epi: x / Non Sq Epi: x / Bacteria: x        RADIOLOGY & ADDITIONAL TESTS:  Studies reviewed.

## 2024-02-11 NOTE — SWALLOW BEDSIDE ASSESSMENT ADULT - SLP GENERAL OBSERVATIONS
Pt. received in bed on O2 nasal cannula. Confused. Difficulty managing secretions. Wet/gurgly resp. quality.

## 2024-02-11 NOTE — PROGRESS NOTE ADULT - SUBJECTIVE AND OBJECTIVE BOX
Patient seen and evaluated this am, remains on 2 pressors      T(F): 99.1 (02-11-24 @ 05:00), Max: 102 (02-10-24 @ 19:13)  HR: 109 (02-11-24 @ 13:00)  BP: 94/47 (02-11-24 @ 13:00)  RR: 20  SpO2: 100% (02-11-24 @ 13:00) (94% - 100%)    PHYSICAL EXAM:  GENERAL: NAD  HEAD:  Atraumatic, Normocephalic  EYES: EOMI, PERRLA, conjunctiva and sclera clear  NERVOUS SYSTEM:   no focal deficits   CHEST/LUNG:  bilateral rhonchi  HEART: Regular rate and rhythm; No murmurs, rubs, or gallops  ABDOMEN: Soft, Nontender, Nondistended; Bowel sounds present  EXTREMITIES:  2+ Peripheral Pulses, No clubbing, cyanosis, or edema    LABS  02-11    142  |  100  |  21<H>  ----------------------------<  137<H>  3.1<L>   |  34<H>  |  0.5<L>    Ca    7.8<L>      11 Feb 2024 11:22  Phos  1.3     02-11  Mg     1.9     02-11    TPro  4.0<L>  /  Alb  2.4<L>  /  TBili  7.1<H>  /  DBili  x   /  AST  352<H>  /  ALT  666<H>  /  AlkPhos  51  02-11                          8.8    0.24  )-----------( 16       ( 11 Feb 2024 05:45 )             26.1         Culture Results:   No growth at 24 hours (02-09-24)  Culture Results:   No growth at 24 hours (02-09-24)  Culture Results:   Normal Urogenital stefano present (02-08-24)  Culture Results:   Growth in aerobic bottle: Pseudomonas aeruginosa  Direct identification is available within approximately 3-5  hours either by Blood Panel Multiplexed PCR or Direct  MALDI-TOF. Details: [email or web address] (02-08-24)  Culture Results:   Growth in aerobic and anaerobic bottles: Pseudomonas aeruginosa  See previous culture [id number] (02-08-24)    RADIOLOGY  < from: Xray Chest 1 View-PORTABLE IMMEDIATE (Xray Chest 1 View-PORTABLE IMMEDIATE .) (02.11.24 @ 10:49) >  Lung parenchyma/Pleura: Stable bilateral mid lung field streaky opacities    < end of copied text >  < from: US Abdomen Upper Quadrant Right (02.10.24 @ 18:25) >  IMPRESSION:  1.  Fatty infiltration of the liver (hepatic steatosis).  2.  Gallbladder moderately distended with sludge and slight wall edema.   No stones or sonographic evidence of acute cholecystitis.      < end of copied text >    MEDICATIONS  (STANDING):  allopurinol 300 milliGRAM(s) Oral daily  chlorhexidine 2% Cloths 1 Application(s) Topical daily  lactated ringers. 1000 milliLiter(s) (100 mL/Hr) IV Continuous <Continuous>  meropenem  IVPB 2000 milliGRAM(s) IV Intermittent every 12 hours  norepinephrine Infusion 0.05 MICROgram(s)/kG/Min (6.89 mL/Hr) IV Continuous <Continuous>  pantoprazole   Suspension 40 milliGRAM(s) Oral daily  phenylephrine    Infusion 0.5 MICROgram(s)/kG/Min (6.89 mL/Hr) IV Continuous <Continuous>  potassium chloride  20 mEq/100 mL IVPB 20 milliEquivalent(s) IV Intermittent every 2 hours   vancomycin  IVPB 1000 milliGRAM(s) IV Intermittent every 12 hours  vasopressin Infusion 0.02 Unit(s)/Min (3 mL/Hr) IV Continuous <Continuous>    MEDICATIONS  (PRN):  ibuprofen  Tablet. 400 milliGRAM(s) Oral every 8 hours PRN Temp greater or equal to 38C (100.4F), Mild Pain (1 - 3)

## 2024-02-11 NOTE — SWALLOW BEDSIDE ASSESSMENT ADULT - SWALLOW EVAL: DIAGNOSIS
Immediate overt s/s of penetration/aspiration for tsp sips of thin liquids. Difficulty managing secretions. Wet/gurgly vocal and resp. quality.

## 2024-02-11 NOTE — PROGRESS NOTE ADULT - SUBJECTIVE AND OBJECTIVE BOX
Nephrology progress note    Patient is seen and examined, events over the last 24 h noted .  Pt noted to have severe polyuria 6.3 L yesterday and profound hypokalemia  received multiple 6-8 K riders  On 2 pressors, mildy hypotensive  Allergies:  No Known Allergies    Hospital Medications:   MEDICATIONS  (STANDING):  allopurinol 300 milliGRAM(s) Oral daily  dextrose 5% + sodium chloride 0.9%. 1000 milliLiter(s) (100 mL/Hr) IV Continuous <Continuous>  filgrastim-sndz (ZARXIO) Injectable 480 MICROGram(s) SubCutaneous every 24 hours  lactated ringers. 1000 milliLiter(s) (100 mL/Hr) IV Continuous <Continuous>  meropenem  IVPB 2000 milliGRAM(s) IV Intermittent every 12 hours  norepinephrine Infusion 0.05 MICROgram(s)/kG/Min (6.89 mL/Hr) IV Continuous <Continuous>  phenylephrine    Infusion 0.5 MICROgram(s)/kG/Min (6.89 mL/Hr) IV Continuous <Continuous>  potassium chloride  20 mEq/100 mL IVPB 20 milliEquivalent(s) IV Intermittent every 2 hours  vancomycin  IVPB      vancomycin  IVPB 1000 milliGRAM(s) IV Intermittent every 12 hours  vasopressin Infusion 0.02 Unit(s)/Min (3 mL/Hr) IV Continuous <Continuous>        VITALS:  T(F): 99.1 (02-11-24 @ 05:00), Max: 102 (02-10-24 @ 19:13)  HR: 102 (02-11-24 @ 06:00)  BP: 94/46 (02-11-24 @ 06:00)  RR: 22 (02-11-24 @ 06:00)  SpO2: 100% (02-11-24 @ 06:00)  Wt(kg): --    02-09 @ 07:01  -  02-10 @ 07:00  --------------------------------------------------------  IN: 6313 mL / OUT: 5910 mL / NET: 403 mL    02-10 @ 07:01  -  02-11 @ 07:00  --------------------------------------------------------  IN: 6002 mL / OUT: 6345 mL / NET: -343 mL          PHYSICAL EXAM:  Constitutional: NAD, on NC  HEENT: icteric sclera  Neck: No JVD  Respiratory: CTA  Cardiovascular: S1, S2, RRR  Gastrointestinal: BS+, soft, NT/ND  Extremities: No peripheral edema  Neurological: A/O x 3  : Has vann.   Skin: No rashes  Vascular Access:    LABS:  02-11    143  |  101  |  21<H>  ----------------------------<  175<H>  3.0<L>   |  32  |  0.5<L>    Ca    7.8<L>      11 Feb 2024 05:45  Phos  1.5     02-11  Mg     1.5     02-11    TPro  4.0<L>  /  Alb  2.4<L>  /  TBili  7.1<H>  /  DBili      /  AST  352<H>  /  ALT  666<H>  /  AlkPhos  51  02-11                          8.4    0.19  )-----------( 22       ( 10 Feb 2024 05:39 )             23.6       Urine Studies:  Urinalysis Basic - ( 11 Feb 2024 05:45 )    Color:  / Appearance:  / SG:  / pH:   Gluc: 175 mg/dL / Ketone:   / Bili:  / Urobili:    Blood:  / Protein:  / Nitrite:    Leuk Esterase:  / RBC:  / WBC    Sq Epi:  / Non Sq Epi:  / Bacteria:       Osmolality, Random Urine: 322 mos/kg (02-10 @ 10:57)  Sodium, Random Urine: 131.0 mmoL/L (02-10 @ 10:56)  Creatinine, Random Urine: 6 mg/dL (02-10 @ 10:56)  Protein/Creatinine Ratio Calculation: 2.2 Ratio (02-10 @ 10:56)  Potassium, Random Urine: 12 mmol/L (02-10 @ 10:56)    RADIOLOGY & ADDITIONAL STUDIES:  < from: Xray Chest 1 View- PORTABLE-Routine (02.10.24 @ 06:46) >    Bilateral opacities, unchanged..    < end of copied text >

## 2024-02-11 NOTE — PROVIDER CONTACT NOTE (OTHER) - BACKGROUND
pt currently has fever since start of pm shift at 19:00 on 2/11. pt currently has cooling blanket on.

## 2024-02-12 NOTE — PROGRESS NOTE ADULT - SUBJECTIVE AND OBJECTIVE BOX
Nephrology Progress Note    MARAL COURTNEY  [id number]  78y  Female    S:  Patient is seen and examined, events over the last 24h noted.    O:  Allergies:  No Known Allergies    Hospital Medications:   MEDICATIONS  (STANDING):  allopurinol 300 milliGRAM(s) Oral daily  chlorhexidine 2% Cloths 1 Application(s) Topical daily  desmopressin Injectable 1 MICROGram(s) SubCutaneous every 12 hours  dextrose 5% + sodium chloride 0.9%. 1000 milliLiter(s) (100 mL/Hr) IV Continuous <Continuous>  lactated ringers. 1000 milliLiter(s) (100 mL/Hr) IV Continuous <Continuous>  meropenem  IVPB 2000 milliGRAM(s) IV Intermittent every 12 hours  norepinephrine Infusion 0.05 MICROgram(s)/kG/Min (6.89 mL/Hr) IV Continuous <Continuous>  pantoprazole   Suspension 40 milliGRAM(s) Oral daily  phenylephrine    Infusion 0.5 MICROgram(s)/kG/Min (6.89 mL/Hr) IV Continuous <Continuous>  vasopressin Infusion 0.02 Unit(s)/Min (3 mL/Hr) IV Continuous <Continuous>    MEDICATIONS  (PRN):  ibuprofen  Tablet. 400 milliGRAM(s) Oral every 8 hours PRN Temp greater or equal to 38C (100.4F), Mild Pain (1 - 3)    Home Medications:  acyclovir 400 mg oral tablet: 1 tab(s) orally once a day (2022 20:53)  allopurinol 300 mg oral tablet: 1 tab(s) orally once a day (2022 20:53)  potassium chloride 20 mEq oral tablet, extended release: 1 tab(s) orally once a day (2022 20:53)      VITALS:  Daily     Daily Weight in k.5 (2024 07:11)  T(F): 99.9 (24 @ 07:11), Max: 101.7 (24 @ 21:13)  HR: 92 (24 @ 07:30)  BP: 115/55 (24 @ 07:30)  RR: 24 (24 @ 07:30)  SpO2: 100% (24 @ 07:30)  Wt(kg): --  I&O's Detail    2024 07:01  -  2024 07:00  --------------------------------------------------------  IN:    IV PiggyBack: 100 mL    IV PiggyBack: 300 mL    IV PiggyBack: 100 mL    IV PiggyBack: 500 mL    Lactated Ringers: 2200 mL    Phenylephrine: 2313.2 mL    Platelets - Single Donor: 227 mL    Vasopressin: 69 mL  Total IN: 5809.2 mL    OUT:    Indwelling Catheter - Urethral (mL): 2385 mL  Total OUT: 2385 mL    Total NET: 3424.2 mL        I&O's Summary    2024 07:  -  2024 07:00  --------------------------------------------------------  IN: 5809.2 mL / OUT: 2385 mL / NET: 3424.2 mL          PHYSICAL EXAM:  Gen: NAD  Chest: b/l breath sounds  Abd: soft  Extremities: no edema  Vascular access:       LABS:  ABG - ( 10 Feb 2024 12:27 )  pH, Arterial: 7.62  pH, Blood: x     /  pCO2: 40    /  pO2: 143   / HCO3: 41    / Base Excess: 18.1  /  SaO2: 98.6              Blood Gas Arterial - Sodium: 138 mmol/L (02-10-24 @ 12:27)  Blood Gas Arterial - Calcium, Ionized: 1.04 mmol/L (02-10-24 @ 12:27)  Blood Gas Arterial - Sodium: 136 mmol/L (24 @ 12:10)        147<H>  |  106  |  28<H>  ----------------------------<  140<H>  3.6   |  31  |  0.6<L>    Ca    7.8<L>      2024 06:16  Phos  3.0       Mg     2.4         TPro  4.2<L>  /  Alb  2.4<L>  /  TBili  9.5<H>  /  DBili      /  AST  73<H>  /  ALT  380<H>  /  AlkPhos  52      eGFR: 93 mL/min/1.73m2 (24 @ 06:16)  eGFR: 97 mL/min/1.73m2 (24 @ 19:05)    Phosphorus: 3.0 mg/dL (24 @ 06:16)  Phosphorus: 1.3 mg/dL (24 @ 11:22)                            8.8    0.24  )-----------( 16       ( 2024 05:45 )             26.1     Mean Cell Volume: 90.3 fL (24 @ 05:45)          Urine Studies:  Protein, Urine: 100 mg/dL (24 @ 09:14)  White Blood Cell - Urine: 3 /HPF (24 @ 09:14)  Red Blood Cell - Urine: 6 /HPF (24 @ 09:14)  Protein, Urine: 30 mg/dL (02-10-24 @ 10:57)  White Blood Cell - Urine: 1 /HPF (02-10-24 @ 10:57)  Red Blood Cell - Urine: 3 /HPF (02-10-24 @ 10:57)  Protein, Urine: 100 mg/dL (24 @ 19:23)  White Blood Cell - Urine: 1 /HPF (24 @ 19:23)  Red Blood Cell - Urine: 1 /HPF (24 @ 19:23)  Protein, Urine: 100 mg/dL (22 @ 17:10)  White Blood Cell - Urine: 6-10 /HPF (22 @ 17:10)  Red Blood Cell - Urine: 3-5 /HPF (22 @ 17:10)      Urea Nitrogen,  Random Urine: 214 mg/dL (24 @ 09:11)    Osmolality, Random Urine: 393 mos/kg ( @ 13:30)  Osmolality, Random Urine: 389 mos/kg ( @ 09:14)   Nephrology Progress Note    MARAL COURTNEY  MRN-[id number]  78y  Female    S:  Patient is seen and examined, events over the last 24h noted.    O:  Allergies:  No Known Allergies    Hospital Medications:   MEDICATIONS  (STANDING):  allopurinol 300 milliGRAM(s) Oral daily  chlorhexidine 2% Cloths 1 Application(s) Topical daily  desmopressin Injectable 1 MICROGram(s) SubCutaneous every 12 hours  dextrose 5% + sodium chloride 0.9%. 1000 milliLiter(s) (100 mL/Hr) IV Continuous <Continuous>  lactated ringers. 1000 milliLiter(s) (100 mL/Hr) IV Continuous <Continuous>  meropenem  IVPB 2000 milliGRAM(s) IV Intermittent every 12 hours  norepinephrine Infusion 0.05 MICROgram(s)/kG/Min (6.89 mL/Hr) IV Continuous <Continuous>  pantoprazole   Suspension 40 milliGRAM(s) Oral daily  phenylephrine    Infusion 0.5 MICROgram(s)/kG/Min (6.89 mL/Hr) IV Continuous <Continuous>  vasopressin Infusion 0.02 Unit(s)/Min (3 mL/Hr) IV Continuous <Continuous>    MEDICATIONS  (PRN):  ibuprofen  Tablet. 400 milliGRAM(s) Oral every 8 hours PRN Temp greater or equal to 38C (100.4F), Mild Pain (1 - 3)    Home Medications:  acyclovir 400 mg oral tablet: 1 tab(s) orally once a day (2022 20:53)  allopurinol 300 mg oral tablet: 1 tab(s) orally once a day (2022 20:53)  potassium chloride 20 mEq oral tablet, extended release: 1 tab(s) orally once a day (2022 20:53)      VITALS:  Daily     Daily Weight in k.5 (2024 07:11)  T(F): 99.9 (24 @ 07:11), Max: 101.7 (24 @ 21:13)  HR: 92 (24 @ 07:30)  BP: 115/55 (24 @ 07:30)  RR: 24 (24 @ 07:30)  SpO2: 100% (24 @ 07:30)  Wt(kg): --  I&O's Detail    2024 07:01  -  2024 07:00  --------------------------------------------------------  IN:    IV PiggyBack: 100 mL    IV PiggyBack: 300 mL    IV PiggyBack: 100 mL    IV PiggyBack: 500 mL    Lactated Ringers: 2200 mL    Phenylephrine: 2313.2 mL    Platelets - Single Donor: 227 mL    Vasopressin: 69 mL  Total IN: 5809.2 mL    OUT:    Indwelling Catheter - Urethral (mL): 2385 mL  Total OUT: 2385 mL    Total NET: 3424.2 mL        I&O's Summary    2024 07:01  -  2024 07:00  --------------------------------------------------------  IN: 5809.2 mL / OUT: 2385 mL / NET: 3424.2 mL          PHYSICAL EXAM:  Gen: NAD on nc, ill appearing  Chest: b/l breath sounds, decreased at the bases  Abd: soft  Extremities: no edema      LABS:  ABG - ( 10 Feb 2024 12:27 )  pH, Arterial: 7.62  pH, Blood: x     /  pCO2: 40    /  pO2: 143   / HCO3: 41    / Base Excess: 18.1  /  SaO2: 98.6          147<H>  |  106  |  28<H>  ----------------------------<  140<H>  3.6   |  31  |  0.6<L>    Ca    7.8<L>      2024 06:16  Phos  3.0       Mg     2.4         TPro  4.2<L>  /  Alb  2.4<L>  /  TBili  9.5<H>  /  DBili      /  AST  73<H>  /  ALT  380<H>  /  AlkPhos  52  12    Phosphorus: 3.0 mg/dL (24 @ 06:16)  Phosphorus: 1.3 mg/dL (24 @ 11:22)                            8.8    0.24  )-----------( 16        05:45 )             26.1     Mean Cell Volume: 90.3 fL (24 @ 05:45)          Urine Studies:  Protein, Urine: 100 mg/dL (24 @ 09:14)  White Blood Cell - Urine: 3 /HPF (24 @ 09:14)  Red Blood Cell - Urine: 6 /HPF (24 @ 09:14)  Osmolality, Random Urine: 393 mos/kg ( @ 13:30)  Osmolality, Random Urine: 389 mos/kg ( @ 09:14)   Nephrology Progress Note    MARAL COURTNEY  MRN-[id number]  78y  Female    S:  Patient is seen and examined, events over the last 24h noted.    O:  Allergies:  No Known Allergies    Hospital Medications:   MEDICATIONS  (STANDING):  allopurinol 300 milliGRAM(s) Oral daily  chlorhexidine 2% Cloths 1 Application(s) Topical daily  desmopressin Injectable 1 MICROGram(s) SubCutaneous every 12 hours  dextrose 5% + sodium chloride 0.9%. 1000 milliLiter(s) (100 mL/Hr) IV Continuous <Continuous>  lactated ringers. 1000 milliLiter(s) (100 mL/Hr) IV Continuous <Continuous>  meropenem  IVPB 2000 milliGRAM(s) IV Intermittent every 12 hours  norepinephrine Infusion 0.05 MICROgram(s)/kG/Min (6.89 mL/Hr) IV Continuous <Continuous>  pantoprazole   Suspension 40 milliGRAM(s) Oral daily  phenylephrine    Infusion 0.5 MICROgram(s)/kG/Min (6.89 mL/Hr) IV Continuous <Continuous>  vasopressin Infusion 0.02 Unit(s)/Min (3 mL/Hr) IV Continuous <Continuous>    MEDICATIONS  (PRN):  ibuprofen  Tablet. 400 milliGRAM(s) Oral every 8 hours PRN Temp greater or equal to 38C (100.4F), Mild Pain (1 - 3)    Home Medications:  acyclovir 400 mg oral tablet: 1 tab(s) orally once a day (2022 20:53)  allopurinol 300 mg oral tablet: 1 tab(s) orally once a day (2022 20:53)  potassium chloride 20 mEq oral tablet, extended release: 1 tab(s) orally once a day (2022 20:53)      VITALS:  Daily     Daily Weight in k.5 (2024 07:11)  T(F): 99.9 (24 @ 07:11), Max: 101.7 (24 @ 21:13)  HR: 92 (24 @ 07:30)  BP: 115/55 (24 @ 07:30)  RR: 24 (24 @ 07:30)  SpO2: 100% (24 @ 07:30)  Wt(kg): --  I&O's Detail    2024 07:01  -  2024 07:00  --------------------------------------------------------  IN:    IV PiggyBack: 100 mL    IV PiggyBack: 300 mL    IV PiggyBack: 100 mL    IV PiggyBack: 500 mL    Lactated Ringers: 2200 mL    Phenylephrine: 2313.2 mL    Platelets - Single Donor: 227 mL    Vasopressin: 69 mL  Total IN: 5809.2 mL    OUT:    Indwelling Catheter - Urethral (mL): 2385 mL  Total OUT: 2385 mL    Total NET: 3424.2 mL        I&O's Summary    2024 07:01  -  2024 07:00  --------------------------------------------------------  IN: 5809.2 mL / OUT: 2385 mL / NET: 3424.2 mL          PHYSICAL EXAM:  Gen: NAD on nc, ill appearing, +scleral icterus  Chest: b/l breath sounds, decreased at the bases  Abd: soft  Extremities: no edema      LABS:  ABG - ( 10 Feb 2024 12:27 )  pH, Arterial: 7.62  pH, Blood: x     /  pCO2: 40    /  pO2: 143   / HCO3: 41    / Base Excess: 18.1  /  SaO2: 98.6          147<H>  |  106  |  28<H>  ----------------------------<  140<H>  3.6   |  31  |  0.6<L>    Ca    7.8<L>      2024 06:16  Phos  3.0       Mg     2.4         TPro  4.2<L>  /  Alb  2.4<L>  /  TBili  9.5<H>  /  DBili      /  AST  73<H>  /  ALT  380<H>  /  AlkPhos  52      Phosphorus: 3.0 mg/dL (24 @ 06:16)  Phosphorus: 1.3 mg/dL (24 @ 11:22)                            8.8    0.24  )-----------( 16        05:45 )             26.1     Mean Cell Volume: 90.3 fL (24 @ 05:45)          Urine Studies:  Protein, Urine: 100 mg/dL (24 @ 09:14)  White Blood Cell - Urine: 3 /HPF (24 @ 09:14)  Red Blood Cell - Urine: 6 /HPF (24 @ 09:14)  Osmolality, Random Urine: 393 mos/kg ( @ 13:30)  Osmolality, Random Urine: 389 mos/kg ( @ 09:14)

## 2024-02-12 NOTE — PROGRESS NOTE ADULT - ASSESSMENT
d/c desmopressin      full progress note to follow DEBORAH / pre-renal iso hypotension (also received contrast, aminoglycoside) / CT noted, no hydro  non oliguric, urine output significantly improved with iv fluid bolus and creatinine is down-trending     Neutropenic fever with septic shock / pancytopenia  HAGMA / lactic acidosis    AML received  chemo DACOGEN  (Decitabine) and Venclexta  Polyuria likely post ATN, resolved on desmopressin but has high positive fluid balance  desmopressin did not raise urine osm  d/c desmopressin so that pt does not become hypervolemic  d/c iv fluids  Severe hypokalemia, hypophosphatemia and hypomagnesemia - replete as needed  mild hypernatremia- encourage po hydration  Sepsis - Pseudomonas in BCX now neg  - on Merrem and Vanco  - monitor ionized calcium level closely.  replete to 1 iv as needed  - on allopurinol should be max 200mg  - strict i/o  - monitor volume status closely.  TTE noted with EF 25%

## 2024-02-12 NOTE — PROGRESS NOTE ADULT - SUBJECTIVE AND OBJECTIVE BOX
Patient is a 78y old  Female who presents with a chief complaint of Neutropenic fever (12 Feb 2024 08:40)        Over Night Events:  On BPAP  On phenylephrine 4.6  On vasopressin    ROS:     All pertinent ROS are negative except HPI         PHYSICAL EXAM    ICU Vital Signs Last 24 Hrs  T(C): 37.7 (12 Feb 2024 07:11), Max: 38.7 (11 Feb 2024 21:13)  T(F): 99.9 (12 Feb 2024 07:11), Max: 101.7 (11 Feb 2024 21:13)  HR: 96 (12 Feb 2024 10:00) (91 - 110)  BP: 114/60 (12 Feb 2024 09:00) (91/55 - 141/63)  BP(mean): 81 (12 Feb 2024 09:00) (68 - 91)  RR: 28 (12 Feb 2024 10:00) (21 - 38)  SpO2: 98% (12 Feb 2024 10:00) (92% - 100%)    O2 Parameters below as of 12 Feb 2024 09:00  Patient On (Oxygen Delivery Method): BiPAP/CPAP    O2 Concentration (%): 50        CONSTITUTIONAL:  NAD    ENT:   Airway patent,       EYES:   Pupils equal,   Round and reactive to light.    CARDIAC:   Normal rate,   Regular rhythm.    No edema    RESPIRATORY:   No wheezing  Bilateral BS  Normal chest expansion  Not tachypneic,  No use of accessory muscles    GASTROINTESTINAL:  Abdomen soft,   Non-tender,   No guarding,   + BS      MUSCULOSKELETAL:   No clubbing, cyanosis    NEUROLOGICAL:   Alert     SKIN:   Skin normal color for race,         02-11-24 @ 07:01  -  02-12-24 @ 07:00  --------------------------------------------------------  IN:    IV PiggyBack: 100 mL    IV PiggyBack: 300 mL    IV PiggyBack: 100 mL    IV PiggyBack: 500 mL    Lactated Ringers: 2200 mL    Phenylephrine: 2313.2 mL    Platelets - Single Donor: 227 mL    Vasopressin: 69 mL  Total IN: 5809.2 mL    OUT:    Indwelling Catheter - Urethral (mL): 2385 mL  Total OUT: 2385 mL    Total NET: 3424.2 mL      02-12-24 @ 07:01  -  02-12-24 @ 10:31  --------------------------------------------------------  IN:    Lactated Ringers: 100 mL    Phenylephrine: 64 mL    Vasopressin: 3 mL  Total IN: 167 mL    OUT:    Indwelling Catheter - Urethral (mL): 275 mL  Total OUT: 275 mL    Total NET: -108 mL          LABS:                            8.0    0.32  )-----------( 35       ( 12 Feb 2024 06:16 )             24.2                                               02-12    147<H>  |  106  |  28<H>  ----------------------------<  140<H>  3.6   |  31  |  0.6<L>    Ca    7.8<L>      12 Feb 2024 06:16  Phos  3.0     02-12  Mg     2.4     02-12    TPro  4.2<L>  /  Alb  2.4<L>  /  TBili  9.5<H>  /  DBili  x   /  AST  73<H>  /  ALT  380<H>  /  AlkPhos  52  02-12                                             Urinalysis Basic - ( 12 Feb 2024 06:16 )    Color: x / Appearance: x / SG: x / pH: x  Gluc: 140 mg/dL / Ketone: x  / Bili: x / Urobili: x   Blood: x / Protein: x / Nitrite: x   Leuk Esterase: x / RBC: x / WBC x   Sq Epi: x / Non Sq Epi: x / Bacteria: x                                                  LIVER FUNCTIONS - ( 12 Feb 2024 06:16 )  Alb: 2.4 g/dL / Pro: 4.2 g/dL / ALK PHOS: 52 U/L / ALT: 380 U/L / AST: 73 U/L / GGT: x                                                                                                                                   ABG - ( 10 Feb 2024 12:27 )  pH, Arterial: 7.62  pH, Blood: x     /  pCO2: 40    /  pO2: 143   / HCO3: 41    / Base Excess: 18.1  /  SaO2: 98.6                MEDICATIONS  (STANDING):  allopurinol 300 milliGRAM(s) Oral daily  chlorhexidine 2% Cloths 1 Application(s) Topical daily  desmopressin Injectable 1 MICROGram(s) SubCutaneous every 12 hours  dextrose 5% + sodium chloride 0.9%. 1000 milliLiter(s) (100 mL/Hr) IV Continuous <Continuous>  lactated ringers. 1000 milliLiter(s) (100 mL/Hr) IV Continuous <Continuous>  meropenem  IVPB 2000 milliGRAM(s) IV Intermittent every 12 hours  norepinephrine Infusion 0.05 MICROgram(s)/kG/Min (6.89 mL/Hr) IV Continuous <Continuous>  pantoprazole   Suspension 40 milliGRAM(s) Oral daily  phenylephrine    Infusion 0.5 MICROgram(s)/kG/Min (6.89 mL/Hr) IV Continuous <Continuous>  vasopressin Infusion 0.02 Unit(s)/Min (3 mL/Hr) IV Continuous <Continuous>    MEDICATIONS  (PRN):  ibuprofen  Tablet. 400 milliGRAM(s) Oral every 8 hours PRN Temp greater or equal to 38C (100.4F), Mild Pain (1 - 3)      New X-rays reviewed:                                                                                  ECHO    CXR interpreted by me:

## 2024-02-12 NOTE — PROGRESS NOTE ADULT - ASSESSMENT
IMPRESSION:  severe sepsis POA  Septic shock on phenylephrine  pseudomonas bacteremia  Acute neutropenic fever  H/o AML on chemotherapy   uti   Renu resolved  Low EF      PLAN:      CNS: Avoid depressants    HEENT: Oral care    PULMONARY: Trial NC. BPAP at night. Goal oxygen 92-95%. HOB @ 45 degrees. Aspiration precautions. Incentive spirometry    CARDIOVASCULAR: On phenylephrine.   Wean pressors  goal map >60.   [place]etah.   Trend lactate.  EF 25% on echo.  cardiology consult  D/c fluids    GI: GI prophylaxis. Feeding per speech. Bowel regimen     RENAL:  Follow up lytes.  Correct as needed  Nephro f/u. On desmopressin for possible central DI    INFECTIOUS DISEASE: Follow up blood cultures  abx per ID    HEMATOLOGICAL:  DVT prophylaxis.  Heme f/u    ENDOCRINE:  Follow up FS.  Insulin protocol if needed.    MUSCULOSKELETAL: Bed rest    ICU      The patient is critically ill with a high probability of deterioration.     IMPRESSION:  severe sepsis POA  Septic shock on phenylephrine  pseudomonas bacteremia  Acute neutropenic fever  H/o AML on chemotherapy   uti   Renu resolved  Low EF      PLAN:      CNS: Avoid depressants    HEENT: Oral care    PULMONARY: Trial NC. BPAP at night. Goal oxygen 92-95%. HOB @ 45 degrees. Aspiration precautions. Incentive spirometry    CARDIOVASCULAR: On phenylephrine.   Wean pressors  goal map >60.   C[place]etah.   Trend lactate.  EF 25% on echo.  cardiology consult  D/c fluids    GI: GI prophylaxis. Feeding per speech. Bowel regimen     RENAL:  Follow up lytes.  Correct as needed  Nephro f/u. On desmopressin for possible central DI    INFECTIOUS DISEASE: Follow up blood cultures  abx per ID    HEMATOLOGICAL:  DVT prophylaxis.  Heme f/u  transfuse plateletes <15 or bleeding    ENDOCRINE:  Follow up FS.  Insulin protocol if needed.    MUSCULOSKELETAL: Bed rest    ICU      The patient is critically ill with a high probability of deterioration.

## 2024-02-12 NOTE — PROGRESS NOTE ADULT - REASON FOR ADMISSION
Neutropenic fever

## 2024-02-12 NOTE — CONSULT NOTE ADULT - ASSESSMENT
Skin assessed- B/L heel dry and intact    High risk for pressure injury developments or progression     Wound #1  Type and location :   Stage two  pressure injury to L buttock   Size: ~1x0.5  Tissue Description :   Pale pink   No odor, erythema, increased warmth, tenderness, induration, fluctuance, nor crepitus    Plan:  Wound and skin care recs.  Clean wound with saline, pat dry then apply triad with Allevyn foam dressing    Pressure  injury  preventive  measures  skin  and incontinence care    Assess wound and inform primary provider of any changes   Case discussed with primary Rn   Wound/ ostomy specialist  to f/u as needed     Offloading: [ x] Frequent position changes [ ] Devices/Equipment  Cleansing: [ ] Saline [x] Soap/Water [ ] Other: ______  Topicals: [x ] Barrier Cream [ ] Antimicrobial [ ] Enzymatic Wound Debridement  Dressings: [ ] Dry, sterile [ ] Allevyn  Foam [ ] Absorbant Pads [ ] Collagenase    Other Recs.   Per Primary team      Total time for bedside assessment , review of medical records  and  discussion of plan of care with primary team greater than 35 min

## 2024-02-12 NOTE — PROGRESS NOTE ADULT - ASSESSMENT
This is a 78 year old female with PMH of AML on chemo (follows with oncologist Dr. Marsh) is brought in for acute hypoxic respiratory failure.     IMPRESSION  #Pseudomonas pneumonia-Life threatening.   Unclear source- Possible UTI vs from the chest wall port? vs Pneumonia  #Neutropenic fever with septic shock  #Pancytopenia. ANC 0.01 and ALC 0.07 on admission.   #Transaminitis- Now improving. Perhaps from septic shock.   #Heart failure with reduced EF  #Acute hypoxic respiratory failure.   #Lactic acidosis  #DEBORAH over CKD  #AML on chemo-On maintenance chemotherapy with Dacogen and Venclexta (Approximately 4 weeks ago)  CMV and EBV IgG positive  Reported to be on Acyclovir and levofloxacin PPX at home.   #Obesity BMI (kg/m2): 28.1  #MRSA nares negative.     RECOMMENDATIONS  -CT abdomen overall negative for significant findings except for hepatic steatosis. Should consider CT chest when able.   -Check serum galactomannan and fungitell.   -S/p one dose of IV amikacin 2/9. Switch marry to cefepime  -Appreciate pharmacy assistance in dosing of the antimicrobials.   -Can resume acyclovir PPX when able.   -Off loading, aspiration precautions.   -Guarded prognosis.     If any questions, please send a message or call on [employer] Teams  Please continue to update ID with any pertinent new laboratory or radiographic findings.    Sadia Rodríguez M.D  Infectious Diseases Attending/   [employer] [facility] [place]   This is a 78 year old female with PMH of AML on chemo (follows with oncologist Dr. Marsh) is brought in for acute hypoxic respiratory failure.     IMPRESSION  #Pseudomonas pneumonia-Life threatening. First negative 2/9/24.   Unclear source- Possible UTI vs from the chest wall port? vs Pneumonia  #Neutropenic fever with septic shock  #Pancytopenia. ANC 0.01 and ALC 0.07 on admission.   #Transaminitis- Now improving. Perhaps from septic shock.   #Heart failure with reduced EF  #Acute hypoxic respiratory failure.   #Lactic acidosis  #DEBORAH over CKD  #AML on chemo-On maintenance chemotherapy with Dacogen and Venclexta (Approximately 4 weeks ago)  CMV and EBV IgG positive  Reported to be on Acyclovir and posaconazole PPX at home.   #Obesity BMI (kg/m2): 28.1  #MRSA nares negative.     RECOMMENDATIONS  -CT abdomen overall negative for significant findings except for hepatic steatosis. Should consider CT chest when able.   -Follow up with serum galactomannan and fungitell.   -S/p one dose of IV amikacin 2/9. Switch marry to cefepime 2 gram q 8 hours.  -Appreciate pharmacy assistance in dosing of the antimicrobials.   -Can resume acyclovir PPX when able.   -Can resume posaconazole 300 mg daily for PPX when able.   -Off loading, aspiration precautions.   -Guarded prognosis.     If any questions, please send a message or call on [employer] Teams  Please continue to update ID with any pertinent new laboratory or radiographic findings.    Sadia Rodríguez M.D  Infectious Diseases Attending/   [employer] at [facility]

## 2024-02-12 NOTE — GOALS OF CARE CONVERSATION - ADVANCED CARE PLANNING - CONVERSATION DETAILS
Called by RN pt pulse ox 60s on NRB. DNR/ DNI. Discussed with family at bedside their wishes.   They clearly expressed their wish verbally to pursue with comfort measures. ICU nurse at bedside witness to conversation. Will make comfort measures only. Ordered morphine IV, ativan, follow palliative care note recommendations.

## 2024-02-12 NOTE — PROGRESS NOTE ADULT - SUBJECTIVE AND OBJECTIVE BOX
SHERWIN MARAL  78y, Female    LOS  4d    INTERVAL EVENTS/HPI  - No acute events overnight  - T(F): , Max: 101.7 (02-11-24 @ 21:13)  - Tolerating medication  - WBC Count: 0.32 (02-12-24 @ 06:16)  WBC Count: 0.24 (02-11-24 @ 05:45)  - Creatinine: 0.6 (02-12-24 @ 06:16)  Creatinine: 0.5 (02-11-24 @ 19:05)    REVIEW OF SYSTEMS:  CONSTITUTIONAL: No fever or chills  HEENT: No sore throat  RESPIRATORY: No cough, no shortness of breath  CARDIOVASCULAR: No chest pain or palpitations  GASTROINTESTINAL: No abdominal or epigastric pain  GENITOURINARY: No dysuria  NEUROLOGICAL: No headache/dizziness  MSK: No joint pain, erythema, or swelling; no back pain  SKIN: No itching, rashes  All other ROS negative except noted above    Prior hospital charts reviewed [Yes]  Primary team notes reviewed [Yes]  Other consultant notes reviewed [Yes]    ANTIMICROBIALS:   meropenem  IVPB 2000 every 12 hours    OTHER MEDS: MEDICATIONS  (STANDING):  allopurinol 300 daily  desmopressin Injectable 1 every 12 hours  ibuprofen  Tablet. 400 every 8 hours PRN  norepinephrine Infusion 0.05 <Continuous>  pantoprazole   Suspension 40 daily  phenylephrine    Infusion 0.5 <Continuous>  vasopressin Infusion 0.02 <Continuous>      Vital Signs Last 24 Hrs  T(F): 99.9 (02-12-24 @ 07:11), Max: 104.7 (02-08-24 @ 21:30)    Vital Signs Last 24 Hrs  HR: 92 (02-12-24 @ 07:30) (91 - 110)  BP: 115/55 (02-12-24 @ 07:30) (91/55 - 141/63)  RR: 24 (02-12-24 @ 07:30)  SpO2: 100% (02-12-24 @ 07:30) (92% - 100%)  Wt(kg): --    EXAM:  GENERAL: In distress. On Bipap.   HEAD: No head lesions  NECK: Supple, nontender to palpation; no JVD  CHEST/LUNG: Shallow breath sounds. Right sided chest wall port c/d/i  HEART: S1 S2  ABDOMEN: Soft, nontender, nondistended  EXTREMITIES: No clubbing  NERVOUS SYSTEM: Alert and oriented to person  MSK: No joint erythema, swelling or pain  SKIN: No rashes or lesions, no superficial thrombophlebitis    Labs:                        8.0    0.32  )-----------( 35       ( 12 Feb 2024 06:16 )             24.2     02-12    147<H>  |  106  |  28<H>  ----------------------------<  140<H>  3.6   |  31  |  0.6<L>    Ca    7.8<L>      12 Feb 2024 06:16  Phos  3.0     02-12  Mg     2.4     02-12    TPro  4.2<L>  /  Alb  2.4<L>  /  TBili  9.5<H>  /  DBili  x   /  AST  73<H>  /  ALT  380<H>  /  AlkPhos  52  02-12      WBC Trend:  WBC Count: 0.32 (02-12-24 @ 06:16)  WBC Count: 0.24 (02-11-24 @ 05:45)  WBC Count: 0.19 (02-10-24 @ 05:39)  WBC Count: 0.40 (02-09-24 @ 06:16)      Creatine Trend:  Creatinine: 0.6 (02-12)  Creatinine: 0.5 (02-11)  Creatinine: 0.5 (02-11)  Creatinine: 0.5 (02-11)      Liver Biochemical Testing Trend:  Alanine Aminotransferase (ALT/SGPT): 380 *H* (02-12)  Alanine Aminotransferase (ALT/SGPT): 501 *H* (02-11)  Alanine Aminotransferase (ALT/SGPT): 666 *H* (02-11)  Alanine Aminotransferase (ALT/SGPT): >700 *H* (02-10)  Alanine Aminotransferase (ALT/SGPT): 1093 *H* (02-10)  Aspartate Aminotransferase (AST/SGOT): 73 (02-12-24 @ 06:16)  Aspartate Aminotransferase (AST/SGOT): 156 (02-11-24 @ 19:05)  Aspartate Aminotransferase (AST/SGOT): 352 (02-11-24 @ 05:45)  Aspartate Aminotransferase (AST/SGOT): >700 (02-10-24 @ 11:00)  Aspartate Aminotransferase (AST/SGOT): 1505 (02-10-24 @ 05:39)  Bilirubin Total: 9.5 (02-12)  Bilirubin Total: 8.3 (02-11)  Bilirubin Total: 7.1 (02-11)  Bilirubin Total: 7.0 (02-10)  Bilirubin Total: 6.6 (02-10)      Trend LDH  02-09-24 @ 06:16  361<H>      Urinalysis Basic - ( 12 Feb 2024 06:16 )    Color: x / Appearance: x / SG: x / pH: x  Gluc: 140 mg/dL / Ketone: x  / Bili: x / Urobili: x   Blood: x / Protein: x / Nitrite: x   Leuk Esterase: x / RBC: x / WBC x   Sq Epi: x / Non Sq Epi: x / Bacteria: x        MICROBIOLOGY:  Vancomycin Level, Random: 9.1 (02-10 @ 06:41)    Female    Culture - Blood (collected 09 Feb 2024 06:16)  Source: .Blood None  Preliminary Report:    No growth at 48 Hours    Culture - Blood (collected 09 Feb 2024 06:16)  Source: .Blood None  Preliminary Report:    No growth at 48 Hours    Culture - Urine (collected 08 Feb 2024 19:23)  Source: Clean Catch Clean Catch (Midstream)  Final Report:    Normal Urogenital stefano present    Culture - Blood (collected 08 Feb 2024 18:10)  Source: .Blood Blood-Peripheral  Final Report:    Growth in aerobic bottle: Pseudomonas aeruginosa    Direct identification is available within approximately 3-5    hours either by Blood Panel Multiplexed PCR or Direct    MALDI-TOF. Details: [email or web address]  Organism: Blood Culture PCR  Pseudomonas aeruginosa  Organism: Pseudomonas aeruginosa    Sensitivities:      Method Type: ARIES      -  Aztreonam: S <=4      -  Cefepime: S <=2      -  Ceftazidime: S 4      -  Ciprofloxacin: S <=0.25      -  Imipenem: I 4      -  Levofloxacin: S <=0.5      -  Meropenem: S <=1      -  Piperacillin/Tazobactam: S <=8  Organism: Blood Culture PCR    Sensitivities:      Method Type: PCR      -  Pseudomonas aeruginosa: Detec    Culture - Blood (collected 08 Feb 2024 18:10)  Source: .Blood Blood-Peripheral  Final Report:    Growth in aerobic and anaerobic bottles: Pseudomonas aeruginosa    See previous culture [id number]    Babesia microti PCR, Blood. (collected 12 Nov 2022 14:15)  Source: .Blood None  Final Report:    Babesia microti PCR    Results: NOT detected    ***************Result Note*************    The detection of Babesia microti by PCR has only been    validated for whole blood; this test has not been approved    by the US Food and Drug Administration (FDA). Performance    characteristics of this assay have been determined by    [employer]. The clinical significance    of results should be considered in conjunction with the    overall clinical presentation of the patient. Result is not    intended to be used as the sole means for clinical diagnosis    or patient management decisions.    One negative sample does not necessarily rule    out the presence of a parasitic infection.    Culture - Urine (collected 11 Nov 2022 17:10)  Source: Clean Catch Clean Catch (Midstream)  Final Report:    <10,000 CFU/mL Normal Urogenital Stefano    Culture - Blood (collected 11 Nov 2022 15:09)  Source: .Blood Blood-Peripheral  Final Report:    No Growth Final    Culture - Blood (collected 11 Nov 2022 15:09)  Source: .Blood Blood-Peripheral  Final Report:    No Growth Final    Cryptococcal Antigen - Serum: Negative (02-10-24 @ 05:39)    Rapid RVP Result: NotDetec (02-10 @ 15:35)  Cryptococcal Antigen - Serum: Negative (02-10 @ 05:39)    Procalcitonin, Serum: 75.70 (02-09)          Lactate Dehydrogenase, Serum: 361 (02-09)      Troponin T, High Sensitivity Result: 13 (02-08)    Lactate, Blood: 3.3 (02-11 @ 05:45)  Lactate, Blood: 3.8 (02-10 @ 19:46)  Blood Gas Arterial, Lactate: 3.4 (02-10 @ 12:27)  Lactate, Blood: 4.8 (02-10 @ 11:00)        RADIOLOGY & ADDITIONAL TESTS:  I have personally reviewed the relevant images.   CXR  Xray Chest 1 View- PORTABLE-Urgent:   ACC: [id number] EXAM:  XR CHEST PORTABLE URGENT 1V   ORDERED BY: WENDY SINHA     PROCEDURE DATE:  02/09/2024          INTERPRETATION:  Clinical History / Reason for exam: Shortness of breath.    Comparison : Chest radiograph 2/8/2024.    Technique/Positioning: Frontal chest radiograph.    Findings:    Support devices: Stable right chest port.    Cardiac/mediastinum/hilum: Unchanged.    Lung parenchyma/Pleura: Improving bilateral opacities. No pneumothorax.    Skeleton/soft tissues: Unchanged.    Impression:    Improving bilateral opacities.        --- End of Report ---      ALVARO ALANIS MD; Attending Radiologist  This document has been electronically signed. Feb 9 2024 11:57AM (02-09-24 @ 08:55)      CT      < from: Xray Chest 1 View-PORTABLE IMMEDIATE (Xray Chest 1 View-PORTABLE IMMEDIATE .) (02.11.24 @ 10:49) >  INTERPRETATION:  Clinical History / Reason for exam: Enteric tube    Comparison : Chest radiograph same day.    Technique/Positioning: AP chest.    Findings:    Support devices: Enteric tube tip in the stomach. Stable Port-A-Cath    Cardiac/mediastinum/hilum: Unchanged    Lung parenchyma/Pleura: Stable bilateral mid lung field streaky opacities    Skeleton/soft tissues: Unchanged    Impression:    Enteric tube tip in the stomach, otherwise unchanged.    < end of copied text >  < from: US Abdomen Upper Quadrant Right (02.10.24 @ 18:25) >  FINDINGS:  Liver: Echogenic liver parenchyma  Bile ducts: Normal caliber. Common bile duct measures 5 mm.  Gallbladder: Gallbladder sludge negative sonographic Hyatt's sign.  Pancreas: Visualized portionsare within normal limits.  Right kidney: 11.2 cm. No hydronephrosis.  Ascites: None.  IVC: Visualized portions are within normal limits.    IMPRESSION:  1.  Fatty infiltration of the liver (hepatic steatosis).  2.  Gallbladder moderately distended with sludge and slight wall edema.   No stones or sonographic evidence of acute cholecystitis.    --- End of Report ---    < end of copied text >    WEIGHT  Weight (kg): 74.2 (02-09-24 @ 01:00)  Creatinine: 0.6 mg/dL (02-12-24 @ 06:16)  Creatinine: 0.5 mg/dL (02-11-24 @ 19:05)  Creatinine: 0.5 mg/dL (02-11-24 @ 16:14)  Creatinine: 0.5 mg/dL (02-11-24 @ 11:22)      All available historical records have been reviewed

## 2024-02-12 NOTE — PROGRESS NOTE ADULT - ATTENDING COMMENTS
patient seen and examined agree above note   bolus 500 cclR now   do cheetah if positive rebolus   start D5w with 3 amp bicarb 100cc/ hr   follow bicarb and PH   VBG in 2 hrs   follow lA   on awa start vaso target map 65   follow cmp Q6 hrs   monitor is and os   renal consult   follow CE   keep pox >92%   Id consult   merop and vanco   send fungetill   bld cx follow up  oncology consult
Attending Statement: I have personally performed a face to face diagnostic evaluation on this patient. The patient is suffering from:  severe sepsis POA  Septic shock on phenylephrine  pseudomonas bacteremia  Acute neutropenic fever  H/o AML on chemotherapy   uti   I have made amendments to the documentation where necessary. I have personally seen and examined this patient.  I have fully participated in the care of this patient.  I have reviewed all pertinent clinical information, including history, physical exam, plan and note.
patient seen and examined agree above note   bolus 500 cclR now   do cheetah if positive rebolus   start D5w with 3 amp bicarb 100cc/ hr   follow bicarb and PH   VBG in 2 hrs   follow lA   on awa start vaso target map 65   follow cmp Q6 hrs   monitor is and os   renal consult   follow CE   keep pox >92%   Id consult   merop and vanco   send fungetill   bld cx follow up  oncology consult

## 2024-02-12 NOTE — AIRWAY PLACEMENT NOTE ADULT - AIRWAY COMMENTS:
1st attempt of intubation using DL, unable to visualize VC, ETT placed/removed- pt 2 hand mask ventilated without difficulty while waiting for glidescope. Glidescope intubation successful

## 2024-02-12 NOTE — PROGRESS NOTE ADULT - ASSESSMENT
78-year-old female past medical history of AML on chemo followed by Dr. Marsh presenting to the ED for evaluation of weakness and shortness of breath since earlier today. Daughter states that she was seen by Dr. Marsh, had an "injection to increase her blood count," received two liters of fluids. Shortly afterwards patient had increased work of breathing and began to feel weak    acute respiratory failure / sepsis / neutropenic fever  / pancytopenia / pseudomonal bacteremia / polyuria / hypophosphatemia / hypokalemia / cardiac arrest - code blue x 8min     - pt now DNR and DNI   - family requesting liberation from ventilator   - continue antibiotics as per ID   - titrate pressors to MAP >65   - continue to supplement lytes   - s/p transfusion of  platelets 16 ->35   - continue Zarxio as per Dr Funes   - pittman cultures - ID consult     - very poor prognosis family aware   - palliative

## 2024-02-12 NOTE — CONSULT NOTE ADULT - CONSULT REASON
neutropenic fever
DEBORAH
low EF
Infection
Pressure injury assessment  and treatment recommendation
Severe anemia

## 2024-02-12 NOTE — PROGRESS NOTE ADULT - SUBJECTIVE AND OBJECTIVE BOX
Patient seen and evaluated this am and again during and after code blue      T(F): 100 (02-12-24 @ 11:01), Max: 101.7 (02-11-24 @ 21:13)  HR: 103 (02-12-24 @ 12:00)  BP: 113/54 (02-12-24 @ 12:00)  RR: 25 (02-12-24 @ 13:00)  SpO2: 93% (02-12-24 @ 13:00) (92% - 100%)    PHYSICAL EXAM:  GENERAL: NAD  HEAD:  Atraumatic, Normocephalic  EYES: EOMI, PERRLA, conjunctiva and sclera clear  NERVOUS SYSTEM:  no focal deficits   CHEST/LUNG  bilateral rhonchi  HEART: Regular rate and rhythm; No murmurs, rubs, or gallops  ABDOMEN: Soft, Nontender, Nondistended; Bowel sounds present  EXTREMITIES:  2+ Peripheral Pulses, No clubbing, cyanosis, or edema    LABS  02-12    147<H>  |  106  |  28<H>  ----------------------------<  140<H>  3.6   |  31  |  0.6<L>    Ca    7.8<L>      12 Feb 2024 06:16  Phos  3.0     02-12  Mg     2.4     02-12    TPro  4.2<L>  /  Alb  2.4<L>  /  TBili  9.5<H>  /  DBili  x   /  AST  73<H>  /  ALT  380<H>  /  AlkPhos  52  02-12                          8.0    0.32  )-----------( 35       ( 12 Feb 2024 06:16 )             24.2         Culture Results:   No growth at 48 Hours (02-09-24)  Culture Results:   No growth at 48 Hours (02-09-24)  Culture Results:   Normal Urogenital stefano present (02-08-24)  Culture Results:   Growth in aerobic bottle: Pseudomonas aeruginosa  Direct identification is available within approximately 3-5  hours either by Blood Panel Multiplexed PCR or Direct  MALDI-TOF. Details: [email or web address] (02-08-24)  Culture Results:   Growth in aerobic and anaerobic bottles: Pseudomonas aeruginosa  See previous culture [id number] (02-08-24)    RADIOLOGY  < from: Xray Chest 1 View- PORTABLE-Routine (02.12.24 @ 08:02) >    Impression:    In comparison with the prior chest x-ray dated February 11, 2022 time   10:26 AM:    Extensive increase in the bilateral lung opacities/effusions right   greater than left.      < end of copied text >    MEDICATIONS  (STANDING):  allopurinol 300 milliGRAM(s) Oral daily  cefepime   IVPB 2000 milliGRAM(s) IV Intermittent every 8 hours  chlorhexidine 2% Cloths 1 Application(s) Topical daily  desmopressin Injectable 1 MICROGram(s) SubCutaneous every 12 hours  norepinephrine Infusion 0.05 MICROgram(s)/kG/Min (6.89 mL/Hr) IV Continuous <Continuous>  pantoprazole   Suspension 40 milliGRAM(s) Oral daily  phenylephrine    Infusion 0.5 MICROgram(s)/kG/Min (6.89 mL/Hr) IV Continuous <Continuous>    MEDICATIONS  (PRN):  ibuprofen  Tablet. 400 milliGRAM(s) Oral every 8 hours PRN Temp greater or equal to 38C (100.4F), Mild Pain (1 - 3)

## 2024-02-12 NOTE — CONSULT NOTE ADULT - CONSULT REQUESTED DATE/TIME
12-Feb-2024 12:37
09-Feb-2024 14:01
09-Feb-2024 17:32
09-Feb-2024 08:34
09-Feb-2024 15:27
09-Feb-2024 16:00

## 2024-02-12 NOTE — CHART NOTE - NSCHARTNOTESELECT_GEN_ALL_CORE
Event Note
Palliative Attending/Event Note
Code Blue/Event Note
Nephrology Fellow/Event Note
SLP/Event Note

## 2024-02-12 NOTE — CONSULT NOTE ADULT - SUBJECTIVE AND OBJECTIVE BOX
HPI:   Patient is a  78 year old female presenting to the ED for evaluation of weakness and shortness of breath since earlier today. Daughter states that she was seen by Dr. Marsh, had an "injection to increase her blood count," received two liters of fluids. Shortly afterwards patient had increased work of breathing and began to feel weak. Otherwise denies any chills, headache, changes in vision, cough, congestion, cp, palpitations, n/v/d, abd pain, constipation, urinary complaints.       PAST MEDICAL & SURGICAL HISTORY:  AML (acute myeloid leukemia)  on chemotx  Brain aneurysm  S/P coiling  S/P cerebral aneurysm repair  coiling    REVIEW OF SYSTEMS: Pt unable to offer    MEDICATIONS  (STANDING):  allopurinol 300 milliGRAM(s) Oral daily  cefepime   IVPB 2000 milliGRAM(s) IV Intermittent every 8 hours  chlorhexidine 2% Cloths 1 Application(s) Topical daily  desmopressin Injectable 1 MICROGram(s) SubCutaneous every 12 hours  lactated ringers Bolus 250 milliLiter(s) IV Bolus once  norepinephrine Infusion 0.05 MICROgram(s)/kG/Min (6.89 mL/Hr) IV Continuous <Continuous>  pantoprazole   Suspension 40 milliGRAM(s) Oral daily  phenylephrine    Infusion 0.5 MICROgram(s)/kG/Min (6.89 mL/Hr) IV Continuous <Continuous>    MEDICATIONS  (PRN):  ibuprofen  Tablet. 400 milliGRAM(s) Oral every 8 hours PRN Temp greater or equal to 38C (100.4F), Mild Pain (1 - 3)      Allergies  No Known Allergies  Intolerances    SOCIAL HISTORY:   From Home     FAMILY HISTORY:  FH: heart disease (Mother)      PHYSICAL EXAM:  Vital Signs Last 24 Hrs  T(C): 37.8 (12 Feb 2024 11:01), Max: 38.7 (11 Feb 2024 21:13)  T(F): 100 (12 Feb 2024 11:01), Max: 101.7 (11 Feb 2024 21:13)  HR: 103 (12 Feb 2024 12:00) (91 - 110)  BP: 113/54 (12 Feb 2024 12:00) (91/55 - 141/63)  BP(mean): 78 (12 Feb 2024 12:00) (68 - 91)  RR: 51 (12 Feb 2024 12:00) (21 - 51)  SpO2: 95% (12 Feb 2024 12:00) (92% - 100%)    Parameters below as of 12 Feb 2024 12:00  Patient On (Oxygen Delivery Method): nasal cannula  O2 Flow (L/min): 4      General : NAD    HEENT:  NC/AT, PERRL, EOMI, sclera clear, mucosa moist, throat clear, trachea midline, neck supple  Cardiovascular: RRR   Respiratory:  O2 via nasal cannula   Gastrointestinal:  soft NT/ND (+)BS  , incontinence   Neurology:  Weakened strength & sensation   Psych: Calm  Musculoskeletal:  limited   Skin:   Pressure Injury     LABS/ CULTURES/ RADIOLOGY:                        8.0    0.32  )-----------( 35       ( 12 Feb 2024 06:16 )             24.2       147  |  106  |  28  ----------------------------<  140      [02-12-24 @ 06:16]  3.6   |  31  |  0.6        Ca     7.8     [02-12-24 @ 06:16]      Mg     2.4     [02-12-24 @ 06:16]      Phos  3.0     [02-12-24 @ 06:16]    TPro  4.2  /  Alb  2.4  /  TBili  9.5  /  DBili  x   /  AST  73  /  ALT  380  /  AlkPhos  52  [02-12-24 @ 06:16]              Culture - Blood (collected 02-09-24 @ 06:16)  Source: .Blood None  Preliminary Report (02-11-24 @ 19:01):    No growth at 48 Hours    Culture - Blood (collected 02-09-24 @ 06:16)  Source: .Blood None  Preliminary Report (02-11-24 @ 19:01):    No growth at 48 Hours    Culture - Blood (collected 02-08-24 @ 18:10)  Source: .Blood Blood-Peripheral  Gram Stain (02-09-24 @ 09:46):    Growth in aerobic bottle: Gram Negative Rods  Final Report (02-11-24 @ 07:11):    Growth in aerobic bottle: Pseudomonas aeruginosa    Direct identification is available within approximately 3-5    hours either by Blood Panel Multiplexed PCR or Direct    MALDI-TOF. Details: [email or web address]  Organism: Blood Culture PCR  Pseudomonas aeruginosa (02-11-24 @ 07:11)  Organism: Pseudomonas aeruginosa (02-11-24 @ 07:11)      Method Type: ARIES      -  Aztreonam: S <=4      -  Cefepime: S <=2      -  Ceftazidime: S 4      -  Ciprofloxacin: S <=0.25      -  Imipenem: I 4      -  Levofloxacin: S <=0.5      -  Meropenem: S <=1      -  Piperacillin/Tazobactam: S <=8  Organism: Blood Culture PCR (02-11-24 @ 07:11)      Method Type: PCR      -  Pseudomonas aeruginosa: Detec    Culture - Blood (collected 02-08-24 @ 18:10)  Source: .Blood Blood-Peripheral  Gram Stain (02-11-24 @ 07:57):    Growth in aerobic bottle: Gram Negative Rods    Growth in anaerobic bottle: Gram Negative Rods  Final Report (02-11-24 @ 07:57):    Growth in aerobic and anaerobic bottles: Pseudomonas aeruginosa    See previous culture [id number]

## 2024-02-12 NOTE — DISCHARGE NOTE FOR THE EXPIRED PATIENT - SECONDARY DIAGNOSIS.
Respiratory failure, unspecified with hypoxia AML (acute myeloid leukemia) Bacteremia due to Pseudomonas Neutropenic fever Cardiac arrest Septic shock Hypokalemia

## 2024-02-12 NOTE — CHART NOTE - NSCHARTNOTEFT_GEN_A_CORE
Code Blue called for asystole.   Pt coded for 8 minutes, rosc achieved. PEA and asystole on monitor during pulse checks. Pt given epi times 3, potassium, magnesium during code.  Difficult intubation, pt intubated about ~7 minutes into code.  Pt was seen during am rounds, awake and alert, stable at that time.     Plan:  ECG, CXR, stat labs, stat abg.     Hospitalist updated family, discussed GOC, full code for now but family will discuss.
Consult received earlier this afternoon s/p weaning from BIPAP. Spoke with KEELY Franco who recommended waiting for eval until later afternoon to see how she tolerated NC. Since then pt was a code blue. Evaluation is not appropriate at this time. Rec continue NPO with alternate means of nutrition/hydration. D/C SLP. Reconsult when pt is appropriate for evaluation.
The team reached out to overnight nephrology to follow up on the patient. 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 Can start desmopressin 1 mcg Q12 hrly. Please monitor sodium Q 6 hrly to avoid hyponatremia and also follow up urine osmolality post desmopressin. In case of hyponatremia will need to hold desmopressin. Monitor UOP. Spoke to primary team.     Case discussed with Dr. Valencia
77yo F with PMH of AML presenting with neutropenic fever found to have bacteremia. Palliative care consulted for assistance with palliative extubation.     Patient had cardiac arrest today. ROSC attained after 8 minutes. Patient intubated. Family note that patient wanted to be DNR/DNI. They would like to extubate to NIV, and continue all other medical interventions. Per report, family is agreeable to patient receiving PRNs to treat for respiratory distress while she is on NIV, with the understanding that this might hasten death.     Plan:  - 15 minutes prior to extubation, please provide IV glycopyrrolate 0.2mg, IV dilaudid 0.5mg, and IV ativan 0.5mg  - following extubation, can provide IV dilaudid 0.2mg q15min PRN for pain/dyspea (increase to 0.5mg if patient continues to be symptomatic with 0.2mg), IV ativan 0.5mg (q30min PRN for anxiety/agitation)  - palliative care will formally evaluate the patient tomorrow    Palliative care will continue to follow.   Please call [phone] with questions or concerns 24/7.   _____________  Vel Ritter MD  Palliative Medicine  [facility]   of Geriatric [facility] Palliative [facility]  [phone]
[facility] attending physician note  spoke with the hospitalist and advised to contact a private [place] oncologist who is following this patient

## 2024-02-12 NOTE — PROGRESS NOTE ADULT - PROVIDER SPECIALTY LIST ADULT
Critical Care
Pulmonology
Hospitalist
Nephrology
Pulmonology
Hospitalist
Nephrology
Hospitalist
Hospitalist
Infectious Disease
Nephrology
Heme/Onc

## 2024-02-12 NOTE — PROGRESS NOTE ADULT - TIME BILLING
Examined the patient. reviewed the charts. Coordinated acre with Nephrology, ID and CCU staff
I have personally seen and examined this patient.    I have reviewed all pertinent clinical information and reviewed all relevant imaging and diagnostic studies personally.   I discussed recommendations with the primary team.

## 2024-02-12 NOTE — DISCHARGE NOTE FOR THE EXPIRED PATIENT - HOSPITAL COURSE
78-year-old female past medical history of AML on chemo followed by Dr. Marsh presenting to the ED for evaluation of weakness and shortness of breath since earlier today. Daughter states that she was seen by Dr. Marsh, had an "injection to increase her blood count," received two liters of fluids. Shortly afterwards patient had increased work of breathing and began to feel weak.  Admitted for acute respiratory failure / sepsis / neutropenic fever s/ intubated. Found to have pseudomonal bacteremia. Also with hypophosphatemia and hypokalemia being repleted.   Had a cardiac arrest 2/12 PM, lasted for 8 minutes, asystole as per communciation prior to ROSC achieved. Subsequently made DNR / DNI, on phenylephrine, family want to liberate patient and continue medical measures and pressor support. Pt liberated from ventilator, soon after became hypoxic to 60s on NRB, tachypneic, discussed with family their wishes and they would like to proceed with comfort measures only. Pt given opiates and Ativan as per palliative recommendations, pressors withdrawn, pt passed soon thereafter. [facility] called 2/12/24 at 6:02 PM. Family, attending and [place] [facility] donation notified.

## 2024-02-12 NOTE — PROGRESS NOTE ADULT - CONVERSATION DETAILS
pt never wanted to be resuscitated or placed on ventilator  DNR and DNI  ET tube and ventilator to be removed  pt may die after liberation from ventilator

## 2024-02-14 LAB
CULTURE RESULTS: SIGNIFICANT CHANGE UP
CULTURE RESULTS: SIGNIFICANT CHANGE UP
SPECIMEN SOURCE: SIGNIFICANT CHANGE UP
SPECIMEN SOURCE: SIGNIFICANT CHANGE UP

## 2024-02-19 LAB — MANUAL DIF COMMENT BLD-IMP: SIGNIFICANT CHANGE UP

## 2024-02-23 DIAGNOSIS — E83.42 HYPOMAGNESEMIA: ICD-10-CM

## 2024-02-23 DIAGNOSIS — R50.81 FEVER PRESENTING WITH CONDITIONS CLASSIFIED ELSEWHERE: ICD-10-CM

## 2024-02-23 DIAGNOSIS — E83.39 OTHER DISORDERS OF PHOSPHORUS METABOLISM: ICD-10-CM

## 2024-02-23 DIAGNOSIS — C92.00 ACUTE MYELOBLASTIC LEUKEMIA, NOT HAVING ACHIEVED REMISSION: ICD-10-CM

## 2024-02-23 DIAGNOSIS — I50.20 UNSPECIFIED SYSTOLIC (CONGESTIVE) HEART FAILURE: ICD-10-CM

## 2024-02-23 DIAGNOSIS — Z11.52 ENCOUNTER FOR SCREENING FOR COVID-19: ICD-10-CM

## 2024-02-23 DIAGNOSIS — L89.322 PRESSURE ULCER OF LEFT BUTTOCK, STAGE 2: ICD-10-CM

## 2024-02-23 DIAGNOSIS — J96.01 ACUTE RESPIRATORY FAILURE WITH HYPOXIA: ICD-10-CM

## 2024-02-23 DIAGNOSIS — R65.21 SEVERE SEPSIS WITH SEPTIC SHOCK: ICD-10-CM

## 2024-02-23 DIAGNOSIS — A41.52 SEPSIS DUE TO PSEUDOMONAS: ICD-10-CM
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
## 2024-02-23 DIAGNOSIS — D63.0 ANEMIA IN NEOPLASTIC DISEASE: ICD-10-CM

## 2024-02-23 DIAGNOSIS — T88.4XXA FAILED OR DIFFICULT INTUBATION, INITIAL ENCOUNTER: ICD-10-CM

## 2024-02-23 DIAGNOSIS — Z98.890 OTHER SPECIFIED POSTPROCEDURAL STATES: ICD-10-CM

## 2024-02-23 DIAGNOSIS — D64.81 ANEMIA DUE TO ANTINEOPLASTIC CHEMOTHERAPY: ICD-10-CM

## 2024-02-23 DIAGNOSIS — Z79.899 OTHER LONG TERM (CURRENT) DRUG THERAPY: ICD-10-CM

## 2024-02-23 DIAGNOSIS — T45.1X5A ADVERSE EFFECT OF ANTINEOPLASTIC AND IMMUNOSUPPRESSIVE DRUGS, INITIAL ENCOUNTER: ICD-10-CM

## 2024-02-23 DIAGNOSIS — I46.9 CARDIAC ARREST, CAUSE UNSPECIFIED: ICD-10-CM

## 2024-02-23 DIAGNOSIS — K76.0 FATTY (CHANGE OF) LIVER, NOT ELSEWHERE CLASSIFIED: ICD-10-CM

## 2024-02-23 DIAGNOSIS — Z51.5 ENCOUNTER FOR PALLIATIVE CARE: ICD-10-CM

## 2024-02-23 DIAGNOSIS — D70.3 NEUTROPENIA DUE TO INFECTION: ICD-10-CM

## 2024-02-23 DIAGNOSIS — D70.1 AGRANULOCYTOSIS SECONDARY TO CANCER CHEMOTHERAPY: ICD-10-CM

## 2024-02-23 DIAGNOSIS — Z66 DO NOT RESUSCITATE: ICD-10-CM
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
## 2024-02-23 DIAGNOSIS — K72.00 ACUTE AND SUBACUTE HEPATIC FAILURE WITHOUT COMA: ICD-10-CM

## 2024-02-23 DIAGNOSIS — N17.0 ACUTE KIDNEY FAILURE WITH TUBULAR NECROSIS: ICD-10-CM

## 2024-02-23 DIAGNOSIS — I34.0 NONRHEUMATIC MITRAL (VALVE) INSUFFICIENCY: ICD-10-CM

## 2024-02-23 DIAGNOSIS — E23.2 DIABETES INSIPIDUS: ICD-10-CM

## 2024-02-23 DIAGNOSIS — D69.59 OTHER SECONDARY THROMBOCYTOPENIA: ICD-10-CM
